# Patient Record
Sex: FEMALE | Race: WHITE | NOT HISPANIC OR LATINO | Employment: OTHER | URBAN - METROPOLITAN AREA
[De-identification: names, ages, dates, MRNs, and addresses within clinical notes are randomized per-mention and may not be internally consistent; named-entity substitution may affect disease eponyms.]

---

## 2017-10-05 ENCOUNTER — TRANSCRIBE ORDERS (OUTPATIENT)
Dept: URGENT CARE | Facility: CLINIC | Age: 82
End: 2017-10-05

## 2017-10-05 ENCOUNTER — OFFICE VISIT (OUTPATIENT)
Dept: URGENT CARE | Facility: CLINIC | Age: 82
End: 2017-10-05
Payer: MEDICARE

## 2017-10-05 ENCOUNTER — APPOINTMENT (OUTPATIENT)
Dept: LAB | Facility: CLINIC | Age: 82
End: 2017-10-05
Payer: MEDICARE

## 2017-10-05 ENCOUNTER — LAB REQUISITION (OUTPATIENT)
Dept: LAB | Facility: HOSPITAL | Age: 82
End: 2017-10-05
Payer: MEDICARE

## 2017-10-05 DIAGNOSIS — R30.0 DYSURIA: ICD-10-CM

## 2017-10-05 PROCEDURE — 87186 SC STD MICRODIL/AGAR DIL: CPT | Performed by: FAMILY MEDICINE

## 2017-10-05 PROCEDURE — G0463 HOSPITAL OUTPT CLINIC VISIT: HCPCS

## 2017-10-05 PROCEDURE — 99203 OFFICE O/P NEW LOW 30 MIN: CPT

## 2017-10-05 PROCEDURE — 87077 CULTURE AEROBIC IDENTIFY: CPT | Performed by: FAMILY MEDICINE

## 2017-10-05 PROCEDURE — 87086 URINE CULTURE/COLONY COUNT: CPT | Performed by: FAMILY MEDICINE

## 2017-10-09 LAB — BACTERIA UR CULT: ABNORMAL

## 2017-10-14 NOTE — PROGRESS NOTES
Assessment  1  Urinary tract infection (599 0) (N39 0)    Plan  Burning with urination    · (1) URINE CULTURE; Source:Urine, Clean Catch; Status:Active - Retrospective By  Protocol Authorization; Requested DZT:[id number];   Urinary tract infection    · Sulfamethoxazole-Trimethoprim 800-160 MG Oral Tablet (Bactrim DS); TAKE 1  TABLET TWICE DAILY UNTIL FINISHED    Discussion/Summary  Discussion Summary:   1) take abx as prescribedencourage hydrationfollow up with pcp if sxs worsen or persist       Chief Complaint  1  Urinary Hesitancy  Chief Complaint Free Text Note Form: Urinary burning and frequency started last night      History of Present Illness  HPI: 81yo F p/w burning upon urination and urinary frequency x 1 day  Pt is requesting bactrim as she had this medicine previously at [facility] for UTI  Pt denies back pain, fevers, chills, fatigue  Hospital Based Practices Required Assessment:   Pain Assessment   the patient states they do not have pain  Abuse And Domestic Violence Screen    Yes, the patient is safe at home  Depression And Suicide Screen  No, the patient has not had thoughts of hurting themself  No, the patient has not felt depressed in the past 7 days  Review of Systems  Focused-Female:   Constitutional: No fever, no chills, feels well, no tiredness, no recent weight gain or loss  ENT: no ear ache, no loss of hearing, no nosebleeds or nasal discharge, no sore throat or hoarseness  Cardiovascular: no complaints of slow or fast heart rate, no chest pain, no palpitations, no leg claudication or lower extremity edema  Respiratory: no complaints of shortness of breath, no wheezing, no dyspnea on exertion, no orthopnea or PND  Breasts: no complaints of breast pain, breast lump or nipple discharge  Gastrointestinal: no complaints of abdominal pain, no constipation, no nausea or diarrhea, no vomiting, no bloody stools     Genitourinary: no complaints of dysuria, no incontinence, no pelvic pain, no dysmenorrhea, no vaginal discharge or abnormal vaginal bleeding  Musculoskeletal: no complaints of arthralgia, no myalgia, no joint swelling or stiffness, no limb pain or swelling  Integumentary: no complaints of skin rash or lesion, no itching or dry skin, no skin wounds  Neurological: no complaints of headache, no confusion, no numbness or tingling, no dizziness or fainting  ROS Reviewed:   ROS reviewed  Past Medical History  1  History of essential hypertension (V12 59) (Z86 79)   2  History of hyperlipidemia (V12 29) (Z86 39)  Active Problems And Past Medical History Reviewed: The active problems and past medical history were reviewed and updated today  Family History  Family History Reviewed: The family history was reviewed and updated today  Social History   · Never a smoker  Social History Reviewed: The social history was reviewed and is unchanged  Surgical History  Surgical History Reviewed: The surgical history was reviewed and updated today  Current Meds   1  Lisinopril TABS; Therapy: (Recorded:05Oct2017) to Recorded   2  Zocor TABS; Therapy: (Recorded:05Oct2017) to Recorded  Medication List Reviewed: The medication list was reviewed and updated today  Allergies  1  No Known Drug Allergies    Vitals  Signs   Recorded: [id number] 05:48PM   Temperature: 98 2 F  Heart Rate: 84  Respiration: 18  Systolic: 482  Diastolic: 88  Height: 5 ft 2 in  Weight: 170 lb   BMI Calculated: 31 09  BSA Calculated: 1 78  O2 Saturation: 98    Physical Exam    Constitutional   General appearance: No acute distress, well appearing and well nourished  Pulmonary   Respiratory effort: No increased work of breathing or signs of respiratory distress  Auscultation of lungs: Clear to auscultation  Cardiovascular   Palpation of heart: Normal PMI, no thrills  Auscultation of heart: Normal rate and rhythm, normal S1 and S2, without murmurs      Abdomen   Abdomen: Abnormal   The abdomen was obese  Bowel sounds were normal  There was moderate tenderness in the suprapubic area  The abdomen was not firm-- and-- not rigid  No rebound tenderness  No guarding  no masses palpated  The abdomen was normal to percussion  no CVA tenderness   Liver and spleen: No hepatomegaly or splenomegaly  Lymphatic   Palpation of lymph nodes in neck: No lymphadenopathy  Skin   Skin and subcutaneous tissue: Normal without rashes or lesions      Psychiatric   Orientation to person, place, and time: Normal     Mood and affect: Normal        Signatures   Electronically signed by : ALO Villa; Oct  5 2017  6:20PM EST                       (Author)    Electronically signed by : TRACY Garrett ; Oct 13 2017 11:40AM EST                       (Co-author)

## 2020-06-29 ENCOUNTER — OFFICE VISIT (OUTPATIENT)
Dept: URGENT CARE | Facility: CLINIC | Age: 85
End: 2020-06-29
Payer: COMMERCIAL

## 2020-06-29 VITALS
HEIGHT: 62 IN | SYSTOLIC BLOOD PRESSURE: 179 MMHG | WEIGHT: 160 LBS | HEART RATE: 93 BPM | DIASTOLIC BLOOD PRESSURE: 81 MMHG | TEMPERATURE: 97.4 F | RESPIRATION RATE: 18 BRPM | OXYGEN SATURATION: 97 % | BODY MASS INDEX: 29.44 KG/M2

## 2020-06-29 DIAGNOSIS — M65.9 TIBIALIS ANTERIOR TENOSYNOVITIS: Primary | ICD-10-CM

## 2020-06-29 PROCEDURE — 99213 OFFICE O/P EST LOW 20 MIN: CPT | Performed by: EMERGENCY MEDICINE

## 2020-06-29 RX ORDER — SIMVASTATIN 10 MG
10 TABLET ORAL
COMMUNITY
Start: 2020-05-07

## 2020-06-29 RX ORDER — ASPIRIN 81 MG/1
81 TABLET, CHEWABLE ORAL DAILY
COMMUNITY

## 2020-06-29 RX ORDER — DIAZEPAM 5 MG/1
5 TABLET ORAL EVERY 6 HOURS PRN
COMMUNITY
Start: 2020-05-07

## 2020-06-29 RX ORDER — LISINOPRIL 40 MG/1
40 TABLET ORAL DAILY
COMMUNITY
Start: 2020-01-09 | End: 2022-04-29

## 2020-06-30 ENCOUNTER — EVALUATION (OUTPATIENT)
Dept: PHYSICAL THERAPY | Facility: CLINIC | Age: 85
End: 2020-06-30
Payer: COMMERCIAL

## 2020-06-30 DIAGNOSIS — M65.9 TIBIALIS ANTERIOR TENOSYNOVITIS: Primary | ICD-10-CM

## 2020-06-30 DIAGNOSIS — S86.112A GASTROCNEMIUS MUSCLE STRAIN, LEFT, INITIAL ENCOUNTER: ICD-10-CM

## 2020-06-30 PROCEDURE — 97161 PT EVAL LOW COMPLEX 20 MIN: CPT | Performed by: PHYSICAL THERAPIST

## 2020-06-30 PROCEDURE — 97110 THERAPEUTIC EXERCISES: CPT | Performed by: PHYSICAL THERAPIST

## 2020-07-02 ENCOUNTER — OFFICE VISIT (OUTPATIENT)
Dept: PHYSICAL THERAPY | Facility: CLINIC | Age: 85
End: 2020-07-02
Payer: COMMERCIAL

## 2020-07-02 DIAGNOSIS — S86.112A GASTROCNEMIUS MUSCLE STRAIN, LEFT, INITIAL ENCOUNTER: ICD-10-CM

## 2020-07-02 DIAGNOSIS — M65.9 TIBIALIS ANTERIOR TENOSYNOVITIS: Primary | ICD-10-CM

## 2020-07-02 PROCEDURE — 97110 THERAPEUTIC EXERCISES: CPT

## 2020-07-02 PROCEDURE — 97140 MANUAL THERAPY 1/> REGIONS: CPT

## 2020-07-02 NOTE — PROGRESS NOTES
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 9    2  Gastrocnemius muscle strain, left, initial encounter S86 112A        Start Time: 0800  Stop Time: 0830  Total time in clinic (min): 30 minutes    Subjective: Patient notes feeling anterior ankle pain during the night  Patient has no complaints of pain prior to therapy  Objective: See treatment diary below    Assessment: Patient very tender at her lateral aspect of the gastroc  Slight soreness post therapy (patient def ice)  Plan: Continue per plan of care       Precautions: HTN- controlled      Daily Treatment Diary:      Initial Evaluation Date: 20  Compliance                    Visit Number 1 2                   Re-Eval  IE                  [address]   [employer]                                          Manual                      STM post  Calf L   10'                   Ankle PROM   5'                                         Ther-Ex                      bike   L1 3'                   Ankle ABC's HEP  2x                   Standing calf stretch - both HEP  20"x5                   Standing HR/TR HEP  x30                                                                                                                                                       Neuro Re-Ed                                                                                                Ther-Act                                                               Modalities                      CP prn   def

## 2020-07-07 ENCOUNTER — OFFICE VISIT (OUTPATIENT)
Dept: PHYSICAL THERAPY | Facility: CLINIC | Age: 85
End: 2020-07-07
Payer: COMMERCIAL

## 2020-07-07 DIAGNOSIS — S86.112A GASTROCNEMIUS MUSCLE STRAIN, LEFT, INITIAL ENCOUNTER: ICD-10-CM

## 2020-07-07 DIAGNOSIS — M65.9 TIBIALIS ANTERIOR TENOSYNOVITIS: Primary | ICD-10-CM

## 2020-07-07 PROCEDURE — 97140 MANUAL THERAPY 1/> REGIONS: CPT | Performed by: PHYSICAL THERAPIST

## 2020-07-07 PROCEDURE — 97110 THERAPEUTIC EXERCISES: CPT | Performed by: PHYSICAL THERAPIST

## 2020-07-07 NOTE — PROGRESS NOTES
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 9    2  Gastrocnemius muscle strain, left, initial encounter N00 401T                   Subjective: Pt reports having good days and bad days  She felt stiff in her calf on , but it is feeling better this morning  Objective: See treatment diary below      Assessment: Tolerated treatment well  Pt continues to have some tenderness in posterior calf, but she noted that it felt better since last session  She needed minimal cueing for proper form and intensity  Pt continues to be afraid of balance exercises due to fear of falls, but was able to perform standing marches while holding on  Patient exhibited good technique with therapeutic exercises and would benefit from continued PT to address current deficits  Plan: Continue per plan of care          Precautions: HTN- controlled      Daily Treatment Diary:      Initial Evaluation Date: 20  Compliance                  Visit Number 1 2  3                 Re-Eval  IE -  -              MC   [employer] Y -  -                                     Manual                      STM post  Calf L   10'  10'                 Ankle PROM   5'  -                                       Ther-Ex                      bike   L1 3'  L1 4'                 Ankle ABC's HEP  2x  2x                 Standing calf stretch - both HEP  20"x5  20" x 5                 Standing HR/TR HEP  x30  30x                  Ankle PF    purple TB 20x                  Standing marches    30x                  Mini squats     nv                                                                                   Neuro Re-Ed                                                                                                Ther-Act Modalities                      CP prn   def Def

## 2020-07-09 ENCOUNTER — OFFICE VISIT (OUTPATIENT)
Dept: PHYSICAL THERAPY | Facility: CLINIC | Age: 85
End: 2020-07-09
Payer: COMMERCIAL

## 2020-07-09 DIAGNOSIS — M65.9 TIBIALIS ANTERIOR TENOSYNOVITIS: Primary | ICD-10-CM

## 2020-07-09 DIAGNOSIS — S86.112A GASTROCNEMIUS MUSCLE STRAIN, LEFT, INITIAL ENCOUNTER: ICD-10-CM

## 2020-07-09 PROCEDURE — 97110 THERAPEUTIC EXERCISES: CPT | Performed by: PHYSICAL THERAPIST

## 2020-07-09 NOTE — PROGRESS NOTES
Daily Note     Today's date: 2020  Patient name: Peggy Montano  : 1931  MRN: [id number]  Referring provider: Luiz Guy MD  Dx:   Encounter Diagnosis     ICD-10-CM    1  Tibialis anterior tenosynovitis M65 9    2  Gastrocnemius muscle strain, left, initial encounter D86 322X                   Subjective: Pt notes that her calf has been feeling better, has been continuing the stretches at home  Complaining of stomach ache this morning, thinks it is due to the fish oil pill she is taking  Objective: See treatment diary below      Assessment: Tolerated treatment well  She was able to perform all TE without increased pain or tenderness  Continues to feel intense stretch in calf during stretches, but no pain noted  Held bike warm up today due to pt not feeling the best  Continue to progress treatment as see fit  Patient demonstrated fatigue post treatment and would benefit from continued PT  Plan: Continue per plan of care        Precautions: HTN- controlled      Daily Treatment Diary:      Initial Evaluation Date: 20  Compliance                Visit Number 1 2   3 4               Re-Som  IE -  -  -            [facility]   [employer] Y -  -  -                                 Manual                      STM post  Calf L   10'   10'  10'               Ankle PROM   5'   -  -                                     Ther-Ex                      bike   L1 3'   L1 4' held               Ankle ABC's HEP  2x   2x  2x               Standing calf stretch - both HEP  20"x5   20" x 5  20" x 5               Standing HR/TR HEP  x30   30x   30x                Ankle PF    purple TB 20x   purple TB 20x                Standing marches    30x   30x                Mini squats     nv  2x10                                                                                 Neuro Re-Ed Ther-Act                                                               Modalities                      CP prn   def Def  Def

## 2020-07-10 ENCOUNTER — OFFICE VISIT (OUTPATIENT)
Dept: URGENT CARE | Facility: CLINIC | Age: 85
End: 2020-07-10
Payer: COMMERCIAL

## 2020-07-10 ENCOUNTER — HOSPITAL ENCOUNTER (EMERGENCY)
Facility: HOSPITAL | Age: 85
Discharge: HOME | End: 2020-07-10
Attending: EMERGENCY MEDICINE | Admitting: EMERGENCY MEDICINE
Payer: COMMERCIAL

## 2020-07-10 ENCOUNTER — APPOINTMENT (EMERGENCY)
Dept: CT IMAGING | Facility: HOSPITAL | Age: 85
End: 2020-07-10
Payer: COMMERCIAL

## 2020-07-10 VITALS
WEIGHT: 161 LBS | BODY MASS INDEX: 29.63 KG/M2 | RESPIRATION RATE: 16 BRPM | DIASTOLIC BLOOD PRESSURE: 72 MMHG | SYSTOLIC BLOOD PRESSURE: 138 MMHG | HEIGHT: 62 IN | HEART RATE: 72 BPM | TEMPERATURE: 97.1 F | OXYGEN SATURATION: 97 %

## 2020-07-10 VITALS
BODY MASS INDEX: 29.44 KG/M2 | TEMPERATURE: 98.1 F | HEART RATE: 88 BPM | HEIGHT: 62 IN | SYSTOLIC BLOOD PRESSURE: 146 MMHG | WEIGHT: 160 LBS | RESPIRATION RATE: 16 BRPM | DIASTOLIC BLOOD PRESSURE: 66 MMHG

## 2020-07-10 DIAGNOSIS — N17.9 AKI (ACUTE KIDNEY INJURY) (HCC): ICD-10-CM

## 2020-07-10 DIAGNOSIS — R10.13 EPIGASTRIC PAIN: Primary | ICD-10-CM

## 2020-07-10 DIAGNOSIS — E87.1 HYPONATREMIA: ICD-10-CM

## 2020-07-10 DIAGNOSIS — R10.12 LEFT UPPER QUADRANT ABDOMINAL PAIN: Primary | ICD-10-CM

## 2020-07-10 LAB
ALBUMIN SERPL BCP-MCNC: 4.4 G/DL (ref 3.5–5)
ALP SERPL-CCNC: 73 U/L (ref 46–116)
ALT SERPL W P-5'-P-CCNC: 44 U/L (ref 12–78)
ANION GAP SERPL CALCULATED.3IONS-SCNC: 10 MMOL/L (ref 4–13)
AST SERPL W P-5'-P-CCNC: 34 U/L (ref 5–45)
BASOPHILS # BLD AUTO: 0.04 THOUSANDS/ΜL (ref 0–0.1)
BASOPHILS NFR BLD AUTO: 1 % (ref 0–1)
BILIRUB SERPL-MCNC: 0.5 MG/DL (ref 0.2–1)
BILIRUB UR QL STRIP: NEGATIVE
BUN SERPL-MCNC: 17 MG/DL (ref 5–25)
CALCIUM SERPL-MCNC: 9.2 MG/DL (ref 8.3–10.1)
CHLORIDE SERPL-SCNC: 89 MMOL/L (ref 100–108)
CLARITY UR: CLEAR
CO2 SERPL-SCNC: 23 MMOL/L (ref 21–32)
COLOR UR: YELLOW
CREAT SERPL-MCNC: 1.43 MG/DL (ref 0.6–1.3)
EOSINOPHIL # BLD AUTO: 0.01 THOUSAND/ΜL (ref 0–0.61)
EOSINOPHIL NFR BLD AUTO: 0 % (ref 0–6)
ERYTHROCYTE [DISTWIDTH] IN BLOOD BY AUTOMATED COUNT: 11.9 % (ref 11.6–15.1)
GFR SERPL CREATININE-BSD FRML MDRD: 32 ML/MIN/1.73SQ M
GLUCOSE SERPL-MCNC: 140 MG/DL (ref 65–140)
GLUCOSE UR STRIP-MCNC: NEGATIVE MG/DL
HCT VFR BLD AUTO: 40.1 % (ref 34.8–46.1)
HGB BLD-MCNC: 13.7 G/DL (ref 11.5–15.4)
HGB UR QL STRIP.AUTO: NEGATIVE
HOLD SPECIMEN: NORMAL
IMM GRANULOCYTES # BLD AUTO: 0.02 THOUSAND/UL (ref 0–0.2)
IMM GRANULOCYTES NFR BLD AUTO: 0 % (ref 0–2)
KETONES UR STRIP-MCNC: NEGATIVE MG/DL
LEUKOCYTE ESTERASE UR QL STRIP: NEGATIVE
LIPASE SERPL-CCNC: 145 U/L (ref 73–393)
LYMPHOCYTES # BLD AUTO: 2.85 THOUSANDS/ΜL (ref 0.6–4.47)
LYMPHOCYTES NFR BLD AUTO: 34 % (ref 14–44)
MCH RBC QN AUTO: 32.3 PG (ref 26.8–34.3)
MCHC RBC AUTO-ENTMCNC: 34.2 G/DL (ref 31.4–37.4)
MCV RBC AUTO: 95 FL (ref 82–98)
MONOCYTES # BLD AUTO: 0.44 THOUSAND/ΜL (ref 0.17–1.22)
MONOCYTES NFR BLD AUTO: 5 % (ref 4–12)
NEUTROPHILS # BLD AUTO: 5.07 THOUSANDS/ΜL (ref 1.85–7.62)
NEUTS SEG NFR BLD AUTO: 60 % (ref 43–75)
NITRITE UR QL STRIP: NEGATIVE
NRBC BLD AUTO-RTO: 0 /100 WBCS
PH UR STRIP.AUTO: 6.5 [PH]
PLATELET # BLD AUTO: 342 THOUSANDS/UL (ref 149–390)
PMV BLD AUTO: 9.3 FL (ref 8.9–12.7)
POTASSIUM SERPL-SCNC: 4.9 MMOL/L (ref 3.5–5.3)
PROT SERPL-MCNC: 8.2 G/DL (ref 6.4–8.2)
PROT UR STRIP-MCNC: NEGATIVE MG/DL
RBC # BLD AUTO: 4.24 MILLION/UL (ref 3.81–5.12)
SODIUM SERPL-SCNC: 122 MMOL/L (ref 136–145)
SP GR UR STRIP.AUTO: <=1.005 (ref 1–1.03)
TROPONIN I SERPL-MCNC: <0.02 NG/ML
UROBILINOGEN UR QL STRIP.AUTO: 0.2 E.U./DL
WBC # BLD AUTO: 8.43 THOUSAND/UL (ref 4.31–10.16)

## 2020-07-10 PROCEDURE — 84484 ASSAY OF TROPONIN QUANT: CPT | Performed by: EMERGENCY MEDICINE

## 2020-07-10 PROCEDURE — 83690 ASSAY OF LIPASE: CPT

## 2020-07-10 PROCEDURE — C9113 INJ PANTOPRAZOLE SODIUM, VIA: HCPCS | Performed by: EMERGENCY MEDICINE

## 2020-07-10 PROCEDURE — 85025 COMPLETE CBC W/AUTO DIFF WBC: CPT

## 2020-07-10 PROCEDURE — 99284 EMERGENCY DEPT VISIT MOD MDM: CPT

## 2020-07-10 PROCEDURE — 96361 HYDRATE IV INFUSION ADD-ON: CPT

## 2020-07-10 PROCEDURE — 99285 EMERGENCY DEPT VISIT HI MDM: CPT | Performed by: EMERGENCY MEDICINE

## 2020-07-10 PROCEDURE — 96375 TX/PRO/DX INJ NEW DRUG ADDON: CPT

## 2020-07-10 PROCEDURE — 36415 COLL VENOUS BLD VENIPUNCTURE: CPT

## 2020-07-10 PROCEDURE — 74177 CT ABD & PELVIS W/CONTRAST: CPT

## 2020-07-10 PROCEDURE — 96374 THER/PROPH/DIAG INJ IV PUSH: CPT

## 2020-07-10 PROCEDURE — 99213 OFFICE O/P EST LOW 20 MIN: CPT | Performed by: EMERGENCY MEDICINE

## 2020-07-10 PROCEDURE — 71260 CT THORAX DX C+: CPT

## 2020-07-10 PROCEDURE — 80053 COMPREHEN METABOLIC PANEL: CPT

## 2020-07-10 PROCEDURE — 81003 URINALYSIS AUTO W/O SCOPE: CPT | Performed by: EMERGENCY MEDICINE

## 2020-07-10 RX ORDER — SODIUM CHLORIDE 9 MG/ML
125 INJECTION, SOLUTION INTRAVENOUS CONTINUOUS
Status: DISCONTINUED | OUTPATIENT
Start: 2020-07-10 | End: 2020-07-10 | Stop reason: HOSPADM

## 2020-07-10 RX ORDER — SULFAMETHOXAZOLE AND TRIMETHOPRIM 400; 80 MG/1; MG/1
2 TABLET ORAL EVERY 12 HOURS SCHEDULED
COMMUNITY
End: 2020-07-26 | Stop reason: ALTCHOICE

## 2020-07-10 RX ORDER — PANTOPRAZOLE SODIUM 40 MG/1
40 TABLET, DELAYED RELEASE ORAL DAILY
Qty: 20 TABLET | Refills: 0 | Status: SHIPPED | OUTPATIENT
Start: 2020-07-10 | End: 2022-02-03

## 2020-07-10 RX ORDER — SACCHAROMYCES BOULARDII 250 MG
250 CAPSULE ORAL 2 TIMES DAILY
COMMUNITY

## 2020-07-10 RX ORDER — ONDANSETRON 2 MG/ML
4 INJECTION INTRAMUSCULAR; INTRAVENOUS ONCE
Status: COMPLETED | OUTPATIENT
Start: 2020-07-10 | End: 2020-07-10

## 2020-07-10 RX ORDER — PANTOPRAZOLE SODIUM 40 MG/1
40 INJECTION, POWDER, FOR SOLUTION INTRAVENOUS ONCE
Status: COMPLETED | OUTPATIENT
Start: 2020-07-10 | End: 2020-07-10

## 2020-07-10 RX ORDER — OMEGA-3-ACID ETHYL ESTERS 1 G/1
2 CAPSULE, LIQUID FILLED ORAL 2 TIMES DAILY
COMMUNITY

## 2020-07-10 RX ADMIN — IOHEXOL 100 ML: 350 INJECTION, SOLUTION INTRAVENOUS at 17:35

## 2020-07-10 RX ADMIN — ONDANSETRON 4 MG: 2 INJECTION INTRAMUSCULAR; INTRAVENOUS at 17:04

## 2020-07-10 RX ADMIN — PANTOPRAZOLE SODIUM 40 MG: 40 INJECTION, POWDER, FOR SOLUTION INTRAVENOUS at 17:04

## 2020-07-10 RX ADMIN — SODIUM CHLORIDE 125 ML/HR: 0.9 INJECTION, SOLUTION INTRAVENOUS at 18:23

## 2020-07-10 NOTE — ED PROVIDER NOTES
History  Chief Complaint   Patient presents with    Abdominal Pain     Pt reports abdominal pain with vomiting for the past 2 days  Denies any vomiting/fevers     Intermittent epigastric pain for 3rd day  Seen at urgent care and referred for evaluation    Past surgical history includes cholecystectomy, appendectomy and hysterectomy      History provided by:  Patient  Abdominal Pain   Pain location:  Epigastric  Pain quality: aching    Pain radiates to:  Does not radiate  Pain severity:  Moderate  Onset quality:  Gradual  Duration:  3 days  Timing:  Intermittent  Progression:  Waxing and waning  Chronicity:  New  Context: eating and previous surgery    Context: not awakening from sleep    Relieved by:  None tried  Exacerbated by: Eating  Ineffective treatments:  None tried  Associated symptoms: anorexia    Associated symptoms: no chest pain, no constipation, no fever, no melena, no nausea, no shortness of breath and no vomiting    Associated symptoms comment:  Last stool typical color, darker on iron  Risk factors: aspirin and multiple surgeries        Prior to Admission Medications   Prescriptions Last Dose Informant Patient Reported?  Taking?   aspirin 81 mg chewable tablet 7/10/2020 at Unknown time  Yes Yes   Sig: Chew 81 mg daily   diazepam (VALIUM) 5 mg tablet   Yes Yes   Si mg every 6 (six) hours as needed    docusate sodium (COLACE) 50 mg capsule 2020 at Unknown time  Yes Yes   Sig: Take 100 mg by mouth 2 (two) times a day    lisinopril (ZESTRIL) 40 mg tablet 7/10/2020 at Unknown time  Yes Yes   Sig: Take 40 mg by mouth daily    omega-3-acid ethyl esters (LOVAZA) 1 g capsule 2020 at Unknown time  Yes Yes   Sig: Take 2 g by mouth 2 (two) times a day   saccharomyces boulardii (FLORASTOR) 250 mg capsule   Yes Yes   Sig: Take 250 mg by mouth 2 (two) times a day   simvastatin (ZOCOR) 10 mg tablet   Yes Yes   Sig: 10 mg    sulfamethoxazole-trimethoprim (BACTRIM) 400-80 mg per tablet 7/10/2020 at Unknown time  Yes Yes   Sig: Take 2 tablets by mouth every 12 (twelve) hours      Facility-Administered Medications: None       Past Medical History:   Diagnosis Date    Arthritis     High cholesterol     Hypertension        Past Surgical History:   Procedure Laterality Date    APPENDECTOMY      CHOLECYSTECTOMY      HIP SURGERY      HYSTERECTOMY      JOINT REPLACEMENT      KNEE SURGERY         Family History   Problem Relation Age of Onset    Cancer Mother     Stroke Father      I have reviewed and agree with the history as documented  E-Cigarette/Vaping     E-Cigarette/Vaping Substances     Social History     Tobacco Use    Smoking status: Never Smoker    Smokeless tobacco: Never Used   Substance Use Topics    Alcohol use: Not Currently    Drug use: Not Currently       Review of Systems   Constitutional: Negative for fever  Respiratory: Negative for shortness of breath  Cardiovascular: Negative for chest pain  Gastrointestinal: Positive for abdominal pain and anorexia  Negative for constipation, melena, nausea and vomiting  All other systems reviewed and are negative  Physical Exam  Physical Exam   Constitutional: She is oriented to person, place, and time  Pleasant, comfortable-appearing, moves well, conversational   HENT:   Head: Normocephalic and atraumatic  Mouth/Throat: Oropharynx is clear and moist    Eyes: Pupils are equal, round, and reactive to light  Conjunctivae and EOM are normal    Neck: Neck supple  Cardiovascular: Normal rate, regular rhythm and normal heart sounds  Pulmonary/Chest: Effort normal and breath sounds normal    Abdominal: Soft  Bowel sounds are normal  She exhibits no distension  There is tenderness in the epigastric area  There is no rigidity and no rebound  Guaiac negative   Musculoskeletal: Normal range of motion  Neurological: She is alert and oriented to person, place, and time  No cranial nerve deficit   Coordination normal    Skin: Skin is warm and dry  Psychiatric: She has a normal mood and affect  Her behavior is normal  Judgment and thought content normal    Nursing note and vitals reviewed  Vital Signs  ED Triage Vitals [07/10/20 1625]   Temperature Pulse Respirations Blood Pressure SpO2   (!) 97 1 °F (36 2 °C) 86 18 (!) 207/94 97 %      Temp Source Heart Rate Source Patient Position - Orthostatic VS BP Location FiO2 (%)   Temporal Monitor Sitting Right arm --      Pain Score       8           Vitals:    07/10/20 1625 07/10/20 1700 07/10/20 1800 07/10/20 1900   BP: (!) 207/94 148/71 143/65 138/72   Pulse: 86 71 74 72   Patient Position - Orthostatic VS: Sitting            Visual Acuity      ED Medications  Medications   sodium chloride 0 9 % infusion (0 mL/hr Intravenous Stopped 7/10/20 1921)   ondansetron (ZOFRAN) injection 4 mg (4 mg Intravenous Given 7/10/20 1704)   pantoprazole (PROTONIX) injection 40 mg (40 mg Intravenous Given 7/10/20 1704)   iohexol (OMNIPAQUE) 350 MG/ML injection (MULTI-DOSE) 100 mL (100 mL Intravenous Given 7/10/20 1735)       Diagnostic Studies  Results Reviewed     Procedure Component Value Units Date/Time    UA w Reflex to Microscopic w Reflex to Culture [[id number]] Collected:  07/10/20 1822    Lab Status:  Final result Specimen:  Urine, Clean Catch Updated:  07/10/20 1829     Color, UA Yellow     Clarity, UA Clear     Specific Gravity, UA <=1 005     pH, UA 6 5     Leukocytes, UA Negative     Nitrite, UA Negative     Protein, UA Negative mg/dl      Glucose, UA Negative mg/dl      Ketones, UA Negative mg/dl      Urobilinogen, UA 0 2 E U /dl      Bilirubin, UA Negative     Blood, UA Negative    [place] draw [[id number]] Collected:  07/10/20 1638    Lab Status:  Final result Specimen:  Blood from Arm, Left Updated:  07/10/20 1801    Narrative: The following orders were created for panel order [place] draw    Procedure                               Abnormality         Status                     --------- -----------         ------                     Raul Nicole Top on [facility][[id number]]                           Final result               Green / Black tube on [facility][[id number]]                       Final result                 Please view results for these tests on the individual orders      Troponin I [[id number]]  (Normal) Collected:  07/10/20 1637    Lab Status:  Final result Specimen:  Blood Updated:  07/10/20 1716     Troponin I <0 02 ng/mL     Comprehensive metabolic panel [[id number]]  (Abnormal) Collected:  07/10/20 1638    Lab Status:  Final result Specimen:  Blood from Arm, Left Updated:  07/10/20 1708     Sodium 122 mmol/L      Potassium 4 9 mmol/L      Chloride 89 mmol/L      CO2 23 mmol/L      ANION GAP 10 mmol/L      BUN 17 mg/dL      Creatinine 1 43 mg/dL      Glucose 140 mg/dL      Calcium 9 2 mg/dL      AST 34 U/L      ALT 44 U/L      Alkaline Phosphatase 73 U/L      Total Protein 8 2 g/dL      Albumin 4 4 g/dL      Total Bilirubin 0 50 mg/dL      eGFR 32 ml/min/1 73sq m     Narrative:       [facility] guidelines for Chronic Kidney Disease (CKD):     Stage 1 with normal or high GFR (GFR > 90 mL/min/1 73 square meters)    Stage 2 Mild CKD (GFR = 60-89 mL/min/1 73 square meters)    Stage 3A Moderate CKD (GFR = 45-59 mL/min/1 73 square meters)    Stage 3B Moderate CKD (GFR = 30-44 mL/min/1 73 square meters)    Stage 4 Severe CKD (GFR = 15-29 mL/min/1 73 square meters)    Stage 5 End Stage CKD (GFR <15 mL/min/1 73 square meters)  Note: GFR calculation is accurate only with a steady state creatinine    Lipase [[id number]]  (Normal) Collected:  07/10/20 1638    Lab Status:  Final result Specimen:  Blood from Arm, Left Updated:  07/10/20 1708     Lipase 145 u/L     CBC and differential [[id number]] Collected:  07/10/20 1638    Lab Status:  Final result Specimen:  Blood from Arm, Left Updated:  07/10/20 1644     WBC 8 43 Thousand/uL      RBC 4 24 Million/uL Hemoglobin 13 7 g/dL      Hematocrit 40 1 %      MCV 95 fL      MCH 32 3 pg      MCHC 34 2 g/dL      RDW 11 9 %      MPV 9 3 fL      Platelets 896 Thousands/uL      nRBC 0 /100 WBCs      Neutrophils Relative 60 %      Immat GRANS % 0 %      Lymphocytes Relative 34 %      Monocytes Relative 5 %      Eosinophils Relative 0 %      Basophils Relative 1 %      Neutrophils Absolute 5 07 Thousands/µL      Immature Grans Absolute 0 02 Thousand/uL      Lymphocytes Absolute 2 85 Thousands/µL      Monocytes Absolute 0 44 Thousand/µL      Eosinophils Absolute 0 01 Thousand/µL      Basophils Absolute 0 04 Thousands/µL                  CT chest abdomen pelvis w contrast   Final Result by Sophie Sifuentes MD (07/10 1819)      No acute inflammatory process identified in the chest, abdomen or pelvis  Large volume of colonic stool  Small hiatal hernia  Simple right renal cysts  Workstation performed: [id number]                    Procedures  Procedures         ED Course  ED Course as of Jul 10 1925   Fri Jul 10, 2020   1644 EKG 1636 normal sinus rhythm rate 88 normal axis normal intervals T-wave inversion V1 ADL no ST elevation or depression interpreted by me      1803 2/20 cmp reviewed, normal      1849 IMPRESSION:     No acute inflammatory process identified in the chest, abdomen or pelvis      Large volume of colonic stool      Small hiatal hernia      Simple right renal cysts  CT chest abdomen pelvis w contrast   1906 Notes she is feeling much better, no pain  We discussed results and hospitalization, family refusing, states she would like to follow up with her physician and will return immediately if worse or new symptoms, voices good understanding to return if reconsiders hospitalization, further evaluation  Abdomen benign, nontender      1924 Continues to feel well and evidently refusing hospitalization          US AUDIT      Most Recent Value   Initial Alcohol Screen: US AUDIT-C    1   How often do you have a drink containing alcohol?  0 Filed at: 07/10/2020 1626   2  How many drinks containing alcohol do you have on a typical day you are drinking? 0 Filed at: 07/10/2020 1626   3a  Male UNDER 65: How often do you have five or more drinks on one occasion? 0 Filed at: 07/10/2020 1626   3b  FEMALE Any Age, or MALE 65+: How often do you have 4 or more drinks on one occassion? 0 Filed at: 07/10/2020 1626   Audit-C Score  0 Filed at: 07/10/2020 1626                  MAYDA/DAST-10      Most Recent Value   How many times in the past year have you    Used an illegal drug or used a prescription medication for non-medical reasons? Never Filed at: 07/10/2020 1626                                MDM      Disposition  Final diagnoses:   Epigastric pain   Hyponatremia   TIGIST (acute kidney injury) ([facility] [address] )     Time reflects when diagnosis was documented in both MDM as applicable and the Disposition within this note     Time User Action Codes Description Comment    7/10/2020  6:00 PM Charm Puff Add [R10 13] Epigastric pain     7/10/2020  6:00 PM Charm Puff Add [E87 1] Hyponatremia     7/10/2020  6:12 PM Charm Puff Add [N17 9] TIGIST (acute kidney injury) [facility])       ED Disposition     ED Disposition Condition Date/Time Comment    Discharge Stable Fri Jul 10, 2020  7:10 PM Clinton Sellers discharge to home/self care  Follow-up Information     Follow up With Specialties Details Why [address] [place] [address], DO Internal Medicine   [address] [place] [address]  [phone]            Patient's Medications   Discharge Prescriptions    PANTOPRAZOLE (PROTONIX) 40 MG TABLET    Take 1 tablet (40 mg total) by mouth daily for 20 days       Start Date: 7/10/2020 End Date: 7/30/2020       Order Dose: 40 mg       Quantity: 20 tablet    Refills: 0     No discharge procedures on file      PDMP Review     None          ED Provider  Electronically Signed by Lashanda Dennis,   07/10/20 1 Clayton Farias,   07/10/20 1925

## 2020-07-10 NOTE — PROGRESS NOTES
[facility]        NAME: Marianela Talley is a 80 y o  female  : 1931    MRN: [id number]  DATE: July 10, 2020  TIME: 3:40 PM    Assessment and Plan   Left upper quadrant abdominal pain [R10 12]  1  Left upper quadrant abdominal pain  Ambulatory Referral to Emergency Medicine    Transfer to other facility         Patient Instructions     Patient Instructions   Abdominal Pain   WHAT YOU NEED TO KNOW:   Abdominal pain can be dull, achy, or sharp  You may have pain in one area of your abdomen, or in your entire abdomen  Your pain may be caused by a condition such as constipation, food sensitivity or poisoning, infection, or a blockage  Abdominal pain can also be from a hernia, appendicitis, or an ulcer  Liver, gallbladder, or kidney conditions can also cause abdominal pain  The cause of your abdominal pain may be unknown  DISCHARGE INSTRUCTIONS:   Return to the emergency department if:   · You have new chest pain or shortness of breath  · You have pulsing pain in your upper abdomen or lower back that suddenly becomes constant  · Your pain is in the right lower abdominal area and worsens with movement  · You have a fever over 100 4°F (38°C) or shaking chills  · You are vomiting and cannot keep food or liquids down  · Your pain does not improve or gets worse over the next 8 to 12 hours  · You see blood in your vomit or bowel movements, or they look black and tarry  · Your skin or the whites of your eyes turn yellow  · You are a woman and have a large amount of vaginal bleeding that is not your monthly period  Contact your healthcare provider if:   · You have pain in your lower back  · You are a man and have pain in your testicles  · You have pain when you urinate  · You have questions or concerns about your condition or care    Follow up with your healthcare provider within 24 hours or as directed:  Write down your questions so you remember to ask them during your visits  Medicines:   · Medicines  may be given to calm your stomach and prevent vomiting or to decrease pain  Ask how to take pain medicine safely  · Take your medicine as directed  Contact your healthcare provider if you think your medicine is not helping or if you have side effects  Tell him of her if you are allergic to any medicine  Keep a list of the medicines, vitamins, and herbs you take  Include the amounts, and when and why you take them  Bring the list or the pill bottles to follow-up visits  Carry your medicine list with you in case of an emergency  © 2017 [address]  Information is for End User's use only and may not be sold, redistributed or otherwise used for commercial purposes  All illustrations and images included in CareNotes® are the copyrighted property of [employer]  or Frankie Ramírez  The above information is an  only  It is not intended as medical advice for individual conditions or treatments  Talk to your doctor, nurse or pharmacist before following any medical regimen to see if it is safe and effective for you  Follow up with PCP in 3-5 days  Proceed to  ER if symptoms worsen  Chief Complaint     Chief Complaint   Patient presents with    Abdominal Pain     epigastric pain x 2 days with nausea  Feels as if she could vomit she would feel better         History of Present Illness       Patient complains of gradually worsening left upper quadrant pain over the past 2 days  With nausea  She denies vomiting, diarrhea constipation  She denies fever or chills  Past surgical history significant for cholecystectomy and partial hysterectomy  Review of Systems   Review of Systems   Constitutional: Negative for chills, fatigue and fever  HENT: Negative for congestion, trouble swallowing and voice change  Respiratory: Negative for cough, chest tightness, shortness of breath and wheezing      Cardiovascular: Negative for chest pain    Gastrointestinal: Positive for abdominal pain  Negative for abdominal distention, anal bleeding, blood in stool, diarrhea, nausea, rectal pain and vomiting  Genitourinary: Negative for dysuria  Musculoskeletal: Negative for neck stiffness  Skin: Negative for rash  Hematological: Negative for adenopathy  Current Medications       Current Outpatient Medications:     aspirin 81 mg chewable tablet, Chew 81 mg daily, Disp: , Rfl:     diazepam (VALIUM) 5 mg tablet, 5 mg every 6 (six) hours as needed , Disp: , Rfl:     docusate sodium (COLACE) 50 mg capsule, Take 100 mg by mouth 2 (two) times a day , Disp: , Rfl:     lisinopril (ZESTRIL) 40 mg tablet, Take 40 mg by mouth daily , Disp: , Rfl:     omega-3-acid ethyl esters (LOVAZA) 1 g capsule, Take 2 g by mouth 2 (two) times a day, Disp: , Rfl:     saccharomyces boulardii (FLORASTOR) 250 mg capsule, Take 250 mg by mouth 2 (two) times a day, Disp: , Rfl:     simvastatin (ZOCOR) 10 mg tablet, 10 mg , Disp: , Rfl:     sulfamethoxazole-trimethoprim (BACTRIM) 400-80 mg per tablet, Take 2 tablets by mouth every 12 (twelve) hours, Disp: , Rfl:     Current Allergies     Allergies as of 07/10/2020 - Reviewed 07/10/2020   Allergen Reaction Noted    Ibuprofen Other (See Comments) 10/03/2012            The following portions of the patient's history were reviewed and updated as appropriate: allergies, current medications, past family history, past medical history, past social history, past surgical history and problem list      Past Medical History:   Diagnosis Date    Arthritis     High cholesterol     Hypertension        Past Surgical History:   Procedure Laterality Date    APPENDECTOMY      CHOLECYSTECTOMY      HIP SURGERY      HYSTERECTOMY      JOINT REPLACEMENT      KNEE SURGERY         Family History   Problem Relation Age of Onset    Cancer Mother     Stroke Father          Medications have been verified          Objective   /66 Pulse 88   Temp 98 1 °F (36 7 °C) (Tympanic)   Resp 16   Ht 5' 2" (1 575 m)   Wt 72 6 kg (160 lb)   BMI 29 26 kg/m²        Physical Exam     Physical Exam   Constitutional: She is oriented to person, place, and time  She appears well-developed and well-nourished  No distress  HENT:   Right Ear: Tympanic membrane and external ear normal    Left Ear: Tympanic membrane and external ear normal    Nose: Mucosal edema present  Mouth/Throat: Posterior oropharyngeal erythema present  No oropharyngeal exudate or tonsillar abscesses  Neck: Neck supple  Cardiovascular: Normal rate and regular rhythm  Pulmonary/Chest: Effort normal    Abdominal: Soft  She exhibits no distension and no mass  Bowel sounds are decreased  There is no hepatosplenomegaly, splenomegaly or hepatomegaly  There is tenderness  There is no rigidity, no rebound, no guarding, no tenderness at McBurney's point and negative Anne's sign  No hernia  Tender left upper quadrant, no rebound or guarding  Neurological: She is alert and oriented to person, place, and time  Skin: Skin is warm and dry  No rash noted  No pallor  Nursing note and vitals reviewed

## 2020-07-10 NOTE — DISCHARGE INSTRUCTIONS
Return immediately if worse or any symptoms or reconsider hospitalization  Please see your physician within next few days, call as soon as possible for evaluation

## 2020-07-10 NOTE — PATIENT INSTRUCTIONS
Abdominal Pain   WHAT YOU NEED TO KNOW:   Abdominal pain can be dull, achy, or sharp  You may have pain in one area of your abdomen, or in your entire abdomen  Your pain may be caused by a condition such as constipation, food sensitivity or poisoning, infection, or a blockage  Abdominal pain can also be from a hernia, appendicitis, or an ulcer  Liver, gallbladder, or kidney conditions can also cause abdominal pain  The cause of your abdominal pain may be unknown  DISCHARGE INSTRUCTIONS:   Return to the emergency department if:   · You have new chest pain or shortness of breath  · You have pulsing pain in your upper abdomen or lower back that suddenly becomes constant  · Your pain is in the right lower abdominal area and worsens with movement  · You have a fever over 100 4°F (38°C) or shaking chills  · You are vomiting and cannot keep food or liquids down  · Your pain does not improve or gets worse over the next 8 to 12 hours  · You see blood in your vomit or bowel movements, or they look black and tarry  · Your skin or the whites of your eyes turn yellow  · You are a woman and have a large amount of vaginal bleeding that is not your monthly period  Contact your healthcare provider if:   · You have pain in your lower back  · You are a man and have pain in your testicles  · You have pain when you urinate  · You have questions or concerns about your condition or care  Follow up with your healthcare provider within 24 hours or as directed:  Write down your questions so you remember to ask them during your visits  Medicines:   · Medicines  may be given to calm your stomach and prevent vomiting or to decrease pain  Ask how to take pain medicine safely  · Take your medicine as directed  Contact your healthcare provider if you think your medicine is not helping or if you have side effects  Tell him of her if you are allergic to any medicine   Keep a list of the medicines, vitamins, and herbs you take  Include the amounts, and when and why you take them  Bring the list or the pill bottles to follow-up visits  Carry your medicine list with you in case of an emergency  © 2017 [address] Information is for End User's use only and may not be sold, redistributed or otherwise used for commercial purposes  All illustrations and images included in CareNotes® are the copyrighted property of [employer]  or Frankie Ramírez  The above information is an  only  It is not intended as medical advice for individual conditions or treatments  Talk to your doctor, nurse or pharmacist before following any medical regimen to see if it is safe and effective for you

## 2020-07-14 ENCOUNTER — APPOINTMENT (OUTPATIENT)
Dept: PHYSICAL THERAPY | Facility: CLINIC | Age: 85
End: 2020-07-14
Payer: COMMERCIAL

## 2020-07-15 ENCOUNTER — TRANSCRIBE ORDERS (OUTPATIENT)
Dept: PHYSICAL THERAPY | Facility: CLINIC | Age: 85
End: 2020-07-15

## 2020-07-15 DIAGNOSIS — M65.9 SYNOVITIS AND TENOSYNOVITIS, UNSPECIFIED: Primary | ICD-10-CM

## 2020-07-26 ENCOUNTER — OFFICE VISIT (OUTPATIENT)
Dept: URGENT CARE | Facility: CLINIC | Age: 85
End: 2020-07-26
Payer: COMMERCIAL

## 2020-07-26 VITALS
RESPIRATION RATE: 16 BRPM | HEIGHT: 62 IN | SYSTOLIC BLOOD PRESSURE: 176 MMHG | DIASTOLIC BLOOD PRESSURE: 79 MMHG | OXYGEN SATURATION: 97 % | BODY MASS INDEX: 29.63 KG/M2 | TEMPERATURE: 97.8 F | WEIGHT: 161 LBS | HEART RATE: 85 BPM

## 2020-07-26 DIAGNOSIS — N30.00 ACUTE CYSTITIS WITHOUT HEMATURIA: Primary | ICD-10-CM

## 2020-07-26 LAB
SL AMB  POCT GLUCOSE, UA: NEGATIVE
SL AMB LEUKOCYTE ESTERASE,UA: ABNORMAL
SL AMB POCT BILIRUBIN,UA: NEGATIVE
SL AMB POCT BLOOD,UA: ABNORMAL
SL AMB POCT CLARITY,UA: ABNORMAL
SL AMB POCT COLOR,UA: YELLOW
SL AMB POCT KETONES,UA: NEGATIVE
SL AMB POCT NITRITE,UA: NEGATIVE
SL AMB POCT PH,UA: 6
SL AMB POCT SPECIFIC GRAVITY,UA: 1.01
SL AMB POCT URINE PROTEIN: NEGATIVE
SL AMB POCT UROBILINOGEN: 0.2

## 2020-07-26 PROCEDURE — 87086 URINE CULTURE/COLONY COUNT: CPT | Performed by: EMERGENCY MEDICINE

## 2020-07-26 PROCEDURE — 99213 OFFICE O/P EST LOW 20 MIN: CPT | Performed by: EMERGENCY MEDICINE

## 2020-07-26 PROCEDURE — 81002 URINALYSIS NONAUTO W/O SCOPE: CPT | Performed by: EMERGENCY MEDICINE

## 2020-07-26 RX ORDER — PHENAZOPYRIDINE HYDROCHLORIDE 200 MG/1
200 TABLET, FILM COATED ORAL
Qty: 10 TABLET | Refills: 0 | Status: SHIPPED | OUTPATIENT
Start: 2020-07-26 | End: 2022-02-03

## 2020-07-26 RX ORDER — NITROFURANTOIN 25; 75 MG/1; MG/1
100 CAPSULE ORAL 2 TIMES DAILY
Qty: 14 CAPSULE | Refills: 0 | Status: SHIPPED | OUTPATIENT
Start: 2020-07-26 | End: 2020-08-02

## 2020-07-26 NOTE — PATIENT INSTRUCTIONS
UTI  We are sending your urine for culture to determine whether the bacteria causing the infection will be killed with the antibiotic previously prescribed to you  We will call you if there is any problem with the antibiotic we have prescribed  You may take over the counter cranberry supplements, or AZO tablets, which may lessen your UTI symptoms  Increase the amount of fluids you drink daily to flush the urinary system  a  Follow up with Gynecologist if your symptoms do not resolve after antibiotic  b  Go to Emergency Room if you develop a fever greater than 101 4 and back pain      Urinary Tract Infection in Women, Ambulatory Care   GENERAL INFORMATION:   A urinary tract infection (UTI)  is caused by bacteria that get inside your urinary tract  Most bacteria that enter your urinary tract are expelled when you urinate  If the bacteria stay in your urinary tract, you may get an infection  Your urinary tract includes your kidneys, ureters, bladder, and urethra  Urine is made in your kidneys, and it flows from the ureters to the bladder  Urine leaves the bladder through the urethra  A UTI is more common in your lower urinary tract, which includes your bladder and urethra  Common symptoms include the following:   · Urinating more often or waking from sleep to urinate    · Pain or burning when you urinate    · Pain or pressure in your lower abdomen     · Urine that smells bad    · Blood in your urine    · Leaking urine  Seek immediate care for the following symptoms:   · Urinating very little or not at all    · Vomiting    · Shaking chills with a fever    · Side or back pain that gets worse  Treatment for a UTI  may include medicines to treat a bacterial infection  You may also need medicines to decrease pain and burning, or decrease the urge to urinate often  Prevent a UTI:   · Urinate when you feel the urge  Do not hold your urine  Urinate as soon as you feel you have to  · Drink liquids as directed  Ask how much liquid to drink each day and which liquids are best for you  You may need to drink more fluids than usual to help flush out the bacteria  Do not drink alcohol, caffeine, and citrus juices  These can irritate your bladder and increase your symptoms  · Apply heat  on your abdomen for 20 to 30 minutes every 2 hours for as many days as directed  Heat helps decrease discomfort and pressure in your bladder  Follow up with your healthcare provider as directed:  Write down your questions so you remember to ask them during your visits  CARE AGREEMENT:   You have the right to help plan your care  Learn about your health condition and how it may be treated  Discuss treatment options with your caregivers to decide what care you want to receive  You always have the right to refuse treatment  The above information is an  only  It is not intended as medical advice for individual conditions or treatments  Talk to your doctor, nurse or pharmacist before following any medical regimen to see if it is safe and effective for you  © 2014 [address] is for End User's use only and may not be sold, redistributed or otherwise used for commercial purposes  All illustrations and images included in CareNotes® are the copyrighted property of [employer]  or [employer]  We are sending your urine for a culture to determine whether there are bacteria in your urine  If there are bacteria, then we will determine what antibiotics will work best and which will not work  You may take an over the counter cranberry supplement  which may help lessen your dysuria symptoms  Increase the amount of fluids you drink daily to flush infection  c  Follow up with Gynecologist if your symptoms do not resolve  d   Go to Emergency Room if you develop a fever greater than 101 4 and back pain    Dysuria   AMBULATORY CARE:   Dysuria  is trouble urinating, or pain, burning, or discomfort when you urinate  Dysuria is usually a symptom of another problem, such as a blockage or urinary tract infection  Common symptoms include the following:   · Fever     · Cloudy, bad smelling urine     · Urge to urinate often but urinating little     · Back, side, or abdominal pain     · Blood in your urine     · Discharge that smells bad     · Itching  Seek care immediately if:   · You have severe back, side, or abdominal pain  · You have fever and shaking chills  · You vomit several times in a row  Contact your healthcare provider if:   · Your symptoms do not go away, even after treatment  · You have questions or concerns about your condition or care  Treatment for dysuria  may include medicines to treat a bacterial infection or help decrease bladder spasms  Manage your dysuria:   · Drink more liquids  Liquids help flush out bacteria that may be causing an infection  Ask your healthcare provider how much liquid to drink each day and which liquids are best for you  · Take sitz baths as directed  Fill a bathtub with 4 to 6 inches of warm water  You may also use a sitz bath pan that fits over a toilet  Sit in the sitz bath for 20 minutes  Do this 2 to 3 times a day, or as directed  The warm water can help decrease pain and swelling  Follow up with your healthcare provider as directed:  Write down your questions so you remember to ask them during your visits  © 2017 [address] Information is for End User's use only and may not be sold, redistributed or otherwise used for commercial purposes  All illustrations and images included in CareNotes® are the copyrighted property of [employer]  or Frankie Ramírez  The above information is an  only  It is not intended as medical advice for individual conditions or treatments  Talk to your doctor, nurse or pharmacist before following any medical regimen to see if it is safe and effective for you

## 2020-07-26 NOTE — PROGRESS NOTES
[facility]        NAME: Maida Schirmer is a 80 y o  female  : 1931    MRN: [id number]  DATE: 2020  TIME: 8:37 AM    Assessment and Plan   Acute cystitis without hematuria [N30 00]  1  Acute cystitis without hematuria  POCT urine dip    nitrofurantoin (MACROBID) 100 mg capsule    phenazopyridine (PYRIDIUM) 200 mg tablet    Urine culture         Patient Instructions     Patient Instructions     UTI  We are sending your urine for culture to determine whether the bacteria causing the infection will be killed with the antibiotic previously prescribed to you  We will call you if there is any problem with the antibiotic we have prescribed  You may take over the counter cranberry supplements, or AZO tablets, which may lessen your UTI symptoms  Increase the amount of fluids you drink daily to flush the urinary system  a  Follow up with Gynecologist if your symptoms do not resolve after antibiotic  b  Go to Emergency Room if you develop a fever greater than 101 4 and back pain      Urinary Tract Infection in Women, Ambulatory Care   GENERAL INFORMATION:   A urinary tract infection (UTI)  is caused by bacteria that get inside your urinary tract  Most bacteria that enter your urinary tract are expelled when you urinate  If the bacteria stay in your urinary tract, you may get an infection  Your urinary tract includes your kidneys, ureters, bladder, and urethra  Urine is made in your kidneys, and it flows from the ureters to the bladder  Urine leaves the bladder through the urethra  A UTI is more common in your lower urinary tract, which includes your bladder and urethra          Common symptoms include the following:   · Urinating more often or waking from sleep to urinate    · Pain or burning when you urinate    · Pain or pressure in your lower abdomen     · Urine that smells bad    · Blood in your urine    · Leaking urine  Seek immediate care for the following symptoms:   · Urinating very little or not at all    · Vomiting    · Shaking chills with a fever    · Side or back pain that gets worse  Treatment for a UTI  may include medicines to treat a bacterial infection  You may also need medicines to decrease pain and burning, or decrease the urge to urinate often  Prevent a UTI:   · Urinate when you feel the urge  Do not hold your urine  Urinate as soon as you feel you have to  · Drink liquids as directed  Ask how much liquid to drink each day and which liquids are best for you  You may need to drink more fluids than usual to help flush out the bacteria  Do not drink alcohol, caffeine, and citrus juices  These can irritate your bladder and increase your symptoms  · Apply heat  on your abdomen for 20 to 30 minutes every 2 hours for as many days as directed  Heat helps decrease discomfort and pressure in your bladder  Follow up with your healthcare provider as directed:  Write down your questions so you remember to ask them during your visits  CARE AGREEMENT:   You have the right to help plan your care  Learn about your health condition and how it may be treated  Discuss treatment options with your caregivers to decide what care you want to receive  You always have the right to refuse treatment  The above information is an  only  It is not intended as medical advice for individual conditions or treatments  Talk to your doctor, nurse or pharmacist before following any medical regimen to see if it is safe and effective for you  © 2014 [address] is for End User's use only and may not be sold, redistributed or otherwise used for commercial purposes  All illustrations and images included in CareNotes® are the copyrighted property of [employer]  or Frankie Ramírez  We are sending your urine for a culture to determine whether there are bacteria in your urine    If there are bacteria, then we will determine what antibiotics will work best and which will not work  You may take an over the counter cranberry supplement  which may help lessen your dysuria symptoms  Increase the amount of fluids you drink daily to flush infection  c  Follow up with Gynecologist if your symptoms do not resolve  d  Go to Emergency Room if you develop a fever greater than 101 4 and back pain    Dysuria   AMBULATORY CARE:   Dysuria  is trouble urinating, or pain, burning, or discomfort when you urinate  Dysuria is usually a symptom of another problem, such as a blockage or urinary tract infection  Common symptoms include the following:   · Fever     · Cloudy, bad smelling urine     · Urge to urinate often but urinating little     · Back, side, or abdominal pain     · Blood in your urine     · Discharge that smells bad     · Itching  Seek care immediately if:   · You have severe back, side, or abdominal pain  · You have fever and shaking chills  · You vomit several times in a row  Contact your healthcare provider if:   · Your symptoms do not go away, even after treatment  · You have questions or concerns about your condition or care  Treatment for dysuria  may include medicines to treat a bacterial infection or help decrease bladder spasms  Manage your dysuria:   · Drink more liquids  Liquids help flush out bacteria that may be causing an infection  Ask your healthcare provider how much liquid to drink each day and which liquids are best for you  · Take sitz baths as directed  Fill a bathtub with 4 to 6 inches of warm water  You may also use a sitz bath pan that fits over a toilet  Sit in the sitz bath for 20 minutes  Do this 2 to 3 times a day, or as directed  The warm water can help decrease pain and swelling  Follow up with your healthcare provider as directed:  Write down your questions so you remember to ask them during your visits     © 2017 [address] Information is for End User's use only and may not be sold, redistributed or otherwise used for commercial purposes  All illustrations and images included in CareNotes® are the copyrighted property of [employer]  or Frankie Ramírez  The above information is an  only  It is not intended as medical advice for individual conditions or treatments  Talk to your doctor, nurse or pharmacist before following any medical regimen to see if it is safe and effective for you  Follow up with PCP in 3-5 days  Proceed to  ER if symptoms worsen  Chief Complaint     Chief Complaint   Patient presents with    Possible UTI     c/o urinary frequency and cloudy urine since this am         History of Present Illness       Patient complains of cloudy urine and increased frequency since this morning  She was recently treated for UTI with Bactrim  Review of Systems   Review of Systems   Constitutional: Negative for chills and fever  HENT: Negative for trouble swallowing and voice change  Respiratory: Negative for chest tightness, shortness of breath and wheezing  Cardiovascular: Negative for chest pain  Genitourinary: Positive for dysuria and urgency  Negative for flank pain and hematuria  Musculoskeletal: Negative for back pain           Current Medications       Current Outpatient Medications:     aspirin 81 mg chewable tablet, Chew 81 mg daily, Disp: , Rfl:     diazepam (VALIUM) 5 mg tablet, 5 mg every 6 (six) hours as needed , Disp: , Rfl:     docusate sodium (COLACE) 50 mg capsule, Take 100 mg by mouth 2 (two) times a day , Disp: , Rfl:     lisinopril (ZESTRIL) 40 mg tablet, Take 40 mg by mouth daily , Disp: , Rfl:     omega-3-acid ethyl esters (LOVAZA) 1 g capsule, Take 2 g by mouth 2 (two) times a day, Disp: , Rfl:     pantoprazole (PROTONIX) 40 mg tablet, Take 1 tablet (40 mg total) by mouth daily for 20 days, Disp: 20 tablet, Rfl: 0    saccharomyces boulardii (FLORASTOR) 250 mg capsule, Take 250 mg by mouth 2 (two) times a day, Disp: , Rfl:    simvastatin (ZOCOR) 10 mg tablet, 10 mg , Disp: , Rfl:     nitrofurantoin (MACROBID) 100 mg capsule, Take 1 capsule (100 mg total) by mouth 2 (two) times a day for 7 days, Disp: 14 capsule, Rfl: 0    phenazopyridine (PYRIDIUM) 200 mg tablet, Take 1 tablet (200 mg total) by mouth 3 (three) times a day with meals, Disp: 10 tablet, Rfl: 0    Current Allergies     Allergies as of 07/26/2020 - Reviewed 07/26/2020   Allergen Reaction Noted    Bactrim [sulfamethoxazole-trimethoprim] Other (See Comments) 07/26/2020    Ibuprofen Other (See Comments) 10/03/2012            The following portions of the patient's history were reviewed and updated as appropriate: allergies, current medications, past family history, past medical history, past social history, past surgical history and problem list      Past Medical History:   Diagnosis Date    Arthritis     High cholesterol     Hypertension     Urinary tract infection        Past Surgical History:   Procedure Laterality Date    APPENDECTOMY      CHOLECYSTECTOMY      HIP SURGERY      HYSTERECTOMY      JOINT REPLACEMENT      KNEE SURGERY         Family History   Problem Relation Age of Onset    Cancer Mother     Stroke Father          Medications have been verified  Objective   BP (!) 176/79   Pulse 85   Temp 97 8 °F (36 6 °C) (Tympanic)   Resp 16   Ht 5' 2" (1 575 m)   Wt 73 kg (161 lb)   SpO2 97%   BMI 29 45 kg/m²        Physical Exam     Physical Exam   Constitutional: She is oriented to person, place, and time  She appears well-developed and well-nourished  No distress  Abdominal: Soft  Bowel sounds are normal  She exhibits no distension and no mass  There is tenderness  There is no rebound and no guarding  Tender suprapubic region no rebound or guarding, no CVA tenderness  Neurological: She is alert and oriented to person, place, and time  Skin: Skin is warm and dry  No rash noted  Psychiatric: She has a normal mood and affect   Her behavior is normal  Judgment and thought content normal    Nursing note and vitals reviewed

## 2020-07-27 LAB — BACTERIA UR CULT: NORMAL

## 2020-08-04 ENCOUNTER — EVALUATION (OUTPATIENT)
Dept: PHYSICAL THERAPY | Facility: CLINIC | Age: 85
End: 2020-08-04
Payer: COMMERCIAL

## 2020-08-04 DIAGNOSIS — M65.9 TIBIALIS ANTERIOR TENOSYNOVITIS: Primary | ICD-10-CM

## 2020-08-04 DIAGNOSIS — S86.112A GASTROCNEMIUS MUSCLE STRAIN, LEFT, INITIAL ENCOUNTER: ICD-10-CM

## 2020-08-04 PROCEDURE — 97110 THERAPEUTIC EXERCISES: CPT | Performed by: PHYSICAL THERAPIST

## 2020-08-04 PROCEDURE — 97140 MANUAL THERAPY 1/> REGIONS: CPT | Performed by: PHYSICAL THERAPIST

## 2020-08-05 NOTE — PROGRESS NOTES
Re-Evaluation/ DC Summary     Today's date: 2020  Patient name: Janell Lewis  : 1931  MRN: [id number]  Referring provider: Kim Mauricio MD  Dx:   Encounter Diagnosis     ICD-10-CM    1  Tibialis anterior tenosynovitis  M65 9    2  Gastrocnemius muscle strain, left, initial encounter  S86 112A                    Assessment  Assessment details: Janell Lewis has been compliant with attending PT and HEP since initial eval  Janell Lewis has made improvements in objective data since IE and has maximized function  PT reviewed HEP during visit today, pt was comfortable performing all exercises/stretches on her own  Patient is agreeable to d/c to HEP at this time, and will contact clinic with any exercise related questions or concerns as needed  Impairments: activity intolerance, impaired balance, impaired physical strength, lacks appropriate home exercise program and pain with function    Symptom irritability: moderateUnderstanding of Dx/Px/POC: excellent  Goals  ST  Pt will report 50% less tenderness in posterior calf in 2 weeks  MET  2  Pt will report no sleep disturbances due to calf tightness in 2 weeks  MET    LT  Pt will abolish pain to improve quality of life by D/C  MET- some tightness felt with increased activity, but pain  2  Pt will return to PLOF by D/C  MET   3  Strength in L LE will be equal to contralateral side by D/C  MET  4  Pt will be able to go up and down a flight of stairs without difficulty by D/C   MET    Plan  Patient would benefit from: skilled physical therapy  Planned modality interventions: cryotherapy and hydrotherapy  Planned therapy interventions: massage, manual therapy, patient education, body mechanics training, balance, abdominal trunk stabilization, stretching, strengthening, therapeutic activities, therapeutic exercise, home exercise program, gait training, functional ROM exercises and flexibility  Frequency: 2x week  Duration in weeks: 4  Treatment plan discussed with: patient        Subjective Evaluation    History of Present Illness  Mechanism of injury: Pt was in the hospital for low sodium a few weeks ago  She states that while in the hospital, she noted some tightness in calf  Since coming home, she has been doing her stretches and exercises and it has been helping  She notes no difficulty with ADL's or IADL's in the last week  She is comfortable continuing HEP at home and D/C from therapy at this time  Quality of life: excellent    Pain  Current pain ratin  At best pain ratin  At worst pain ratin  Location: posterior calf   Quality: cramping and pressure  Relieving factors: ice    Social Support  Stairs in house: yes   Lives in: multiple-level home  Lives with: alone      Diagnostic Tests  No diagnostic tests performed  Treatments  No previous or current treatments  Patient Goals  Patient goals for therapy: decreased pain, increased strength and increased motion        Objective     Palpation   Left   No palpable tenderness to the anterior tibialis  No tenderness of the lateral gastrocnemius, medial gastrocnemius and soleus  Tenderness   Left Ankle/Foot   No tenderness in the Achilles insertion and proximal Achilles  Active Range of Motion   Left Ankle/Foot   Normal active range of motion    Right Ankle/Foot   Normal active range of motion    Additional Active Range of Motion Details  No increased tightness felt in DF in L LE  Strength/Myotome Testing     Left Hip   Normal muscle strength    Right Hip   Normal muscle strength    Left Knee   Flexion: 4+  Extension: 4+    Right Knee   Normal strength    Left Ankle/Foot   Dorsiflexion: 4+  Plantar flexion: 4+  Inversion: 4+  Eversion: 4+    Right Ankle/Foot   Normal strength    Tests   Left Ankle/Foot   Negative for Homans' sign and Varela       Ambulation   Weight-Bearing Status   Assistive device used: none    Additional Weight-Bearing Status Details  No abnormalities during ambulation  Pt reports no discomfort in calf during short distance walks           Precautions: HTN- controlled      Daily Treatment Diary:      Initial Evaluation Date: 06/30/20  Compliance 6/30 7/2 7/7 7/9 8/4             Visit Number 1 2   3 4  5             Re-Eval  IE -  -  -  -          MC   Foto Captured Y -  -  -  Y                6/30 7/2 7/7 7/9 8/4             Manual                      STM post  Calf L   10'   10'  10'  10'             Ankle PROM   5'   -  -                                     Ther-Ex                      bike   L1 3'   L1 4' held  held             Ankle ABC's HEP  2x   2x  2x  2x              Standing calf stretch - both HEP  20"x5   20" x 5  20" x 5  np             Standing HR/TR HEP  x30   30x   30x   30x on foam             Ankle PF    purple TB 20x   purple TB 20x   purple TB 20x              Standing marches    30x   30x   30x             Mini squats     nv  2x10  -             Seated calf stretch        3x30"             LAQ        #1 5 20x                                    Neuro Re-Ed                                                                                                Ther-Act                                                               Modalities                      CP prn   def Def  Def  Def

## 2020-11-05 ENCOUNTER — OFFICE VISIT (OUTPATIENT)
Dept: URGENT CARE | Facility: CLINIC | Age: 85
End: 2020-11-05
Payer: COMMERCIAL

## 2020-11-05 VITALS
WEIGHT: 155 LBS | BODY MASS INDEX: 28.52 KG/M2 | TEMPERATURE: 97.4 F | OXYGEN SATURATION: 97 % | SYSTOLIC BLOOD PRESSURE: 172 MMHG | HEIGHT: 62 IN | DIASTOLIC BLOOD PRESSURE: 74 MMHG | RESPIRATION RATE: 18 BRPM | HEART RATE: 84 BPM

## 2020-11-05 DIAGNOSIS — R30.0 BURNING WITH URINATION: Primary | ICD-10-CM

## 2020-11-05 LAB
SL AMB  POCT GLUCOSE, UA: NEGATIVE
SL AMB LEUKOCYTE ESTERASE,UA: NORMAL
SL AMB POCT BILIRUBIN,UA: NEGATIVE
SL AMB POCT BLOOD,UA: NORMAL
SL AMB POCT CLARITY,UA: NORMAL
SL AMB POCT COLOR,UA: YELLOW
SL AMB POCT KETONES,UA: NEGATIVE
SL AMB POCT NITRITE,UA: NEGATIVE
SL AMB POCT PH,UA: 6
SL AMB POCT SPECIFIC GRAVITY,UA: 1.02
SL AMB POCT URINE PROTEIN: NORMAL
SL AMB POCT UROBILINOGEN: NEGATIVE

## 2020-11-05 PROCEDURE — 87086 URINE CULTURE/COLONY COUNT: CPT | Performed by: NURSE PRACTITIONER

## 2020-11-05 PROCEDURE — 99213 OFFICE O/P EST LOW 20 MIN: CPT | Performed by: NURSE PRACTITIONER

## 2020-11-05 PROCEDURE — 81002 URINALYSIS NONAUTO W/O SCOPE: CPT | Performed by: NURSE PRACTITIONER

## 2020-11-05 RX ORDER — CIPROFLOXACIN 500 MG/1
500 TABLET, FILM COATED ORAL EVERY 12 HOURS SCHEDULED
Qty: 10 TABLET | Refills: 0 | Status: SHIPPED | OUTPATIENT
Start: 2020-11-05 | End: 2020-11-10

## 2020-11-05 RX ORDER — ZINC GLUCONATE 50 MG
50 TABLET ORAL DAILY
COMMUNITY

## 2020-11-06 LAB — BACTERIA UR CULT: NORMAL

## 2021-03-12 ENCOUNTER — OFFICE VISIT (OUTPATIENT)
Dept: URGENT CARE | Facility: CLINIC | Age: 86
End: 2021-03-12
Payer: COMMERCIAL

## 2021-03-12 VITALS
BODY MASS INDEX: 28.52 KG/M2 | HEART RATE: 84 BPM | DIASTOLIC BLOOD PRESSURE: 77 MMHG | WEIGHT: 155 LBS | RESPIRATION RATE: 19 BRPM | TEMPERATURE: 97.8 F | HEIGHT: 62 IN | SYSTOLIC BLOOD PRESSURE: 164 MMHG | OXYGEN SATURATION: 96 %

## 2021-03-12 DIAGNOSIS — N30.00 ACUTE CYSTITIS WITHOUT HEMATURIA: Primary | ICD-10-CM

## 2021-03-12 LAB
SL AMB  POCT GLUCOSE, UA: NEGATIVE
SL AMB LEUKOCYTE ESTERASE,UA: ABNORMAL
SL AMB POCT BILIRUBIN,UA: NEGATIVE
SL AMB POCT BLOOD,UA: ABNORMAL
SL AMB POCT CLARITY,UA: ABNORMAL
SL AMB POCT COLOR,UA: YELLOW
SL AMB POCT KETONES,UA: NEGATIVE
SL AMB POCT NITRITE,UA: NEGATIVE
SL AMB POCT PH,UA: 6
SL AMB POCT SPECIFIC GRAVITY,UA: 1.02
SL AMB POCT URINE PROTEIN: ABNORMAL
SL AMB POCT UROBILINOGEN: 0.2

## 2021-03-12 PROCEDURE — 87086 URINE CULTURE/COLONY COUNT: CPT | Performed by: EMERGENCY MEDICINE

## 2021-03-12 PROCEDURE — 81002 URINALYSIS NONAUTO W/O SCOPE: CPT | Performed by: EMERGENCY MEDICINE

## 2021-03-12 PROCEDURE — 99213 OFFICE O/P EST LOW 20 MIN: CPT | Performed by: EMERGENCY MEDICINE

## 2021-03-12 RX ORDER — NITROFURANTOIN 25; 75 MG/1; MG/1
100 CAPSULE ORAL 2 TIMES DAILY
Qty: 14 CAPSULE | Refills: 0 | Status: SHIPPED | OUTPATIENT
Start: 2021-03-12 | End: 2021-03-19

## 2021-03-12 NOTE — PATIENT INSTRUCTIONS
Urinary Tract Infection in Women, Ambulatory Care   GENERAL INFORMATION:   A urinary tract infection (UTI)  is caused by bacteria that get inside your urinary tract  Most bacteria that enter your urinary tract are expelled when you urinate  If the bacteria stay in your urinary tract, you may get an infection  Your urinary tract includes your kidneys, ureters, bladder, and urethra  Urine is made in your kidneys, and it flows from the ureters to the bladder  Urine leaves the bladder through the urethra  A UTI is more common in your lower urinary tract, which includes your bladder and urethra  Common symptoms include the following:   · Urinating more often or waking from sleep to urinate    · Pain or burning when you urinate    · Pain or pressure in your lower abdomen     · Urine that smells bad    · Blood in your urine    · Leaking urine  Seek immediate care for the following symptoms:   · Urinating very little or not at all    · Vomiting    · Shaking chills with a fever    · Side or back pain that gets worse  Treatment for a UTI  may include medicines to treat a bacterial infection  You may also need medicines to decrease pain and burning, or decrease the urge to urinate often  Prevent a UTI:   · Urinate when you feel the urge  Do not hold your urine  Urinate as soon as you feel you have to  · Drink liquids as directed  Ask how much liquid to drink each day and which liquids are best for you  You may need to drink more fluids than usual to help flush out the bacteria  Do not drink alcohol, caffeine, and citrus juices  These can irritate your bladder and increase your symptoms  · Apply heat  on your abdomen for 20 to 30 minutes every 2 hours for as many days as directed  Heat helps decrease discomfort and pressure in your bladder  Follow up with your healthcare provider as directed:  Write down your questions so you remember to ask them during your visits     CARE AGREEMENT:   You have the right to help plan your care  Learn about your health condition and how it may be treated  Discuss treatment options with your caregivers to decide what care you want to receive  You always have the right to refuse treatment  The above information is an  only  It is not intended as medical advice for individual conditions or treatments  Talk to your doctor, nurse or pharmacist before following any medical regimen to see if it is safe and effective for you  © 2014 [address] is for End User's use only and may not be sold, redistributed or otherwise used for commercial purposes  All illustrations and images included in CareNotes® are the copyrighted property of [employer]  or Frankie Ramírez

## 2021-03-12 NOTE — PROGRESS NOTES
[facility]        NAME: An Hunt is a 80 y o  female  : 1931    MRN: [id number]  DATE: 2021  TIME: 1:07 PM    Assessment and Plan   Acute cystitis without hematuria [N30 00]  1  Acute cystitis without hematuria  POCT urine dip    Urine culture    nitrofurantoin (MACROBID) 100 mg capsule         Patient Instructions     Patient Instructions     Urinary Tract Infection in Women, Ambulatory Care   GENERAL INFORMATION:   A urinary tract infection (UTI)  is caused by bacteria that get inside your urinary tract  Most bacteria that enter your urinary tract are expelled when you urinate  If the bacteria stay in your urinary tract, you may get an infection  Your urinary tract includes your kidneys, ureters, bladder, and urethra  Urine is made in your kidneys, and it flows from the ureters to the bladder  Urine leaves the bladder through the urethra  A UTI is more common in your lower urinary tract, which includes your bladder and urethra  Common symptoms include the following:   · Urinating more often or waking from sleep to urinate    · Pain or burning when you urinate    · Pain or pressure in your lower abdomen     · Urine that smells bad    · Blood in your urine    · Leaking urine  Seek immediate care for the following symptoms:   · Urinating very little or not at all    · Vomiting    · Shaking chills with a fever    · Side or back pain that gets worse  Treatment for a UTI  may include medicines to treat a bacterial infection  You may also need medicines to decrease pain and burning, or decrease the urge to urinate often  Prevent a UTI:   · Urinate when you feel the urge  Do not hold your urine  Urinate as soon as you feel you have to  · Drink liquids as directed  Ask how much liquid to drink each day and which liquids are best for you  You may need to drink more fluids than usual to help flush out the bacteria  Do not drink alcohol, caffeine, and citrus juices   These can irritate your bladder and increase your symptoms  · Apply heat  on your abdomen for 20 to 30 minutes every 2 hours for as many days as directed  Heat helps decrease discomfort and pressure in your bladder  Follow up with your healthcare provider as directed:  Write down your questions so you remember to ask them during your visits  CARE AGREEMENT:   You have the right to help plan your care  Learn about your health condition and how it may be treated  Discuss treatment options with your caregivers to decide what care you want to receive  You always have the right to refuse treatment  The above information is an  only  It is not intended as medical advice for individual conditions or treatments  Talk to your doctor, nurse or pharmacist before following any medical regimen to see if it is safe and effective for you  © 2014 [address] is for End User's use only and may not be sold, redistributed or otherwise used for commercial purposes  All illustrations and images included in CareNotes® are the copyrighted property of [employer] M [employer]  or Frankie Ramírez  Follow up with PCP in 3-5 days  Proceed to  ER if symptoms worsen  Chief Complaint     Chief Complaint   Patient presents with    Abdominal Pain     Suprapubric pressure, Pain urination          History of Present Illness        Patient complains of burning on urination with increased frequency for the past day  She claims allergy to Bactrim but states Macrobid has worked in the past   She does not want Pyridium as she claims "she does not needed this time"  Review of Systems   Review of Systems   Constitutional: Negative for chills and fever  Genitourinary: Positive for dysuria, frequency and urgency  Negative for difficulty urinating, flank pain and hematuria  Musculoskeletal: Negative for back pain           Current Medications       Current Outpatient Medications:     aspirin 81 mg chewable tablet, Chew 81 mg daily, Disp: , Rfl:     co-enzyme Q-10 30 MG capsule, Take 30 mg by mouth 3 (three) times a day, Disp: , Rfl:     diazepam (VALIUM) 5 mg tablet, 5 mg every 6 (six) hours as needed , Disp: , Rfl:     docusate sodium (COLACE) 50 mg capsule, Take 100 mg by mouth 2 (two) times a day , Disp: , Rfl:     ELDERBERRY PO, Take by mouth, Disp: , Rfl:     lisinopril (ZESTRIL) 40 mg tablet, Take 40 mg by mouth daily , Disp: , Rfl:     simvastatin (ZOCOR) 10 mg tablet, 10 mg , Disp: , Rfl:     VITAMIN D, CHOLECALCIFEROL, PO, Take 2,000 Units by mouth, Disp: , Rfl:     zinc gluconate 50 mg tablet, Take 50 mg by mouth daily, Disp: , Rfl:     nitrofurantoin (MACROBID) 100 mg capsule, Take 1 capsule (100 mg total) by mouth 2 (two) times a day for 7 days, Disp: 14 capsule, Rfl: 0    omega-3-acid ethyl esters (LOVAZA) 1 g capsule, Take 2 g by mouth 2 (two) times a day, Disp: , Rfl:     pantoprazole (PROTONIX) 40 mg tablet, Take 1 tablet (40 mg total) by mouth daily for 20 days, Disp: 20 tablet, Rfl: 0    phenazopyridine (PYRIDIUM) 200 mg tablet, Take 1 tablet (200 mg total) by mouth 3 (three) times a day with meals (Patient not taking: Reported on 11/5/2020), Disp: 10 tablet, Rfl: 0    saccharomyces boulardii (FLORASTOR) 250 mg capsule, Take 250 mg by mouth 2 (two) times a day, Disp: , Rfl:     Current Allergies     Allergies as of 03/12/2021 - Reviewed 03/12/2021   Allergen Reaction Noted    Bactrim [sulfamethoxazole-trimethoprim] Other (See Comments) 07/26/2020    Ibuprofen Other (See Comments) 10/03/2012            The following portions of the patient's history were reviewed and updated as appropriate: allergies, current medications, past family history, past medical history, past social history, past surgical history and problem list      Past Medical History:   Diagnosis Date    Arthritis     High cholesterol     Hypertension     Urinary tract infection        Past Surgical History: Procedure Laterality Date    APPENDECTOMY      CHOLECYSTECTOMY      HIP SURGERY      HYSTERECTOMY      JOINT REPLACEMENT      KNEE SURGERY         Family History   Problem Relation Age of Onset    Cancer Mother     Stroke Father          Medications have been verified  Objective   /77   Pulse 84   Temp 97 8 °F (36 6 °C)   Resp 19   Ht 5' 2" (1 575 m)   Wt 70 3 kg (155 lb)   SpO2 96%   BMI 28 35 kg/m²        Physical Exam     Physical Exam  Vitals signs and nursing note reviewed  Constitutional:       General: She is not in acute distress  Appearance: She is well-developed  Abdominal:      General: Bowel sounds are normal  There is no distension  Palpations: Abdomen is soft  There is no mass  Tenderness: There is abdominal tenderness in the suprapubic area  There is no right CVA tenderness, left CVA tenderness, guarding or rebound  Skin:     General: Skin is warm and dry  Findings: No rash  Neurological:      Mental Status: She is alert and oriented to person, place, and time  Psychiatric:         Behavior: Behavior normal          Thought Content:  Thought content normal          Judgment: Judgment normal

## 2021-03-14 LAB
BACTERIA UR CULT: ABNORMAL
BACTERIA UR CULT: ABNORMAL

## 2021-03-16 ENCOUNTER — IMMUNIZATIONS (OUTPATIENT)
Dept: FAMILY MEDICINE CLINIC | Facility: HOSPITAL | Age: 86
End: 2021-03-16

## 2021-03-16 DIAGNOSIS — Z23 ENCOUNTER FOR IMMUNIZATION: Primary | ICD-10-CM

## 2021-03-16 PROCEDURE — 91300 SARS-COV-2 / COVID-19 MRNA VACCINE (PFIZER-BIONTECH) 30 MCG: CPT

## 2021-03-16 PROCEDURE — 0001A SARS-COV-2 / COVID-19 MRNA VACCINE (PFIZER-BIONTECH) 30 MCG: CPT

## 2021-04-07 ENCOUNTER — OFFICE VISIT (OUTPATIENT)
Dept: URGENT CARE | Facility: CLINIC | Age: 86
End: 2021-04-07
Payer: COMMERCIAL

## 2021-04-07 VITALS
HEIGHT: 62 IN | DIASTOLIC BLOOD PRESSURE: 69 MMHG | WEIGHT: 155 LBS | BODY MASS INDEX: 28.52 KG/M2 | SYSTOLIC BLOOD PRESSURE: 151 MMHG | HEART RATE: 73 BPM | TEMPERATURE: 97 F

## 2021-04-07 DIAGNOSIS — N30.01 ACUTE CYSTITIS WITH HEMATURIA: Primary | ICD-10-CM

## 2021-04-07 LAB
SL AMB  POCT GLUCOSE, UA: NEGATIVE
SL AMB LEUKOCYTE ESTERASE,UA: ABNORMAL
SL AMB POCT BILIRUBIN,UA: NEGATIVE
SL AMB POCT BLOOD,UA: 1
SL AMB POCT CLARITY,UA: ABNORMAL
SL AMB POCT COLOR,UA: YELLOW
SL AMB POCT KETONES,UA: ABNORMAL
SL AMB POCT NITRITE,UA: NEGATIVE
SL AMB POCT PH,UA: 5
SL AMB POCT SPECIFIC GRAVITY,UA: 1.01
SL AMB POCT URINE PROTEIN: ABNORMAL
SL AMB POCT UROBILINOGEN: NEGATIVE

## 2021-04-07 PROCEDURE — 99213 OFFICE O/P EST LOW 20 MIN: CPT | Performed by: PHYSICIAN ASSISTANT

## 2021-04-07 PROCEDURE — 87086 URINE CULTURE/COLONY COUNT: CPT | Performed by: PHYSICIAN ASSISTANT

## 2021-04-07 RX ORDER — CEPHALEXIN 500 MG/1
500 CAPSULE ORAL EVERY 12 HOURS SCHEDULED
Qty: 14 CAPSULE | Refills: 0 | Status: SHIPPED | OUTPATIENT
Start: 2021-04-07 | End: 2021-04-14

## 2021-04-07 NOTE — PROGRESS NOTES
[facility]        NAME: Irene Montesinos is a 80 y o  female  : 1931    MRN: [id number]  DATE: 2021  TIME: 10:02 AM    Assessment and Plan   Acute cystitis with hematuria [N30 01]  1  Acute cystitis with hematuria  POCT urine dip auto non-scope    Ambulatory referral to Urology    cephalexin (KEFLEX) 500 mg capsule    Urine culture       Advised patient to make appointment with urology due to recurrent UTIs  Patient states she has had them all her life and has seen Urology once  They did not find any abnormalities  She reports "I am old enough that it is what it is and I am okay with that"    Patient Instructions   Take antibiotic as prescribed  Complete full dose of antibiotics even if symptoms begin to improve or resolve  May use OTC Tylenol for fever  DRINK LOTS OF FLUIDS  Observe for signs of worsening infection including increased pain, discharge, blood in the urine, back or flank pain, fever or chills, or persistent symptoms  Your symptoms should begin to improve over the next couple days  Follow-up with your PCP in 3-5 days if symptoms worsen or do not improve  Go to ER if symptoms become severe  Follow up with PCP in 3-5 days  Proceed to  ER if symptoms worsen  Chief Complaint     Chief Complaint   Patient presents with    Urinary Tract Infection     Patient states that she has a UTI  Burning and frequency when urinating  that started overnight  History of Present Illness        Patient is an 79-year-old female with significant past medical history of hypertension, hyperlipidemia, and recurrent UTIs presents the office complaining of urinary frequency and urgency since yesterday  Patient was recently treated for UTI 3 weeks ago with Macrobid  Urinary Tract Infection   This is a new problem  The current episode started yesterday  The problem occurs every urination  The problem has been gradually worsening   The quality of the pain is described as burning  There has been no fever  Associated symptoms include frequency and urgency  Pertinent negatives include no chills, discharge, flank pain, hematuria, hesitancy, nausea or vomiting  Her past medical history is significant for recurrent UTIs (since teens)  hx of UTIs       Review of Systems   Review of Systems   Constitutional: Negative for chills and fever  Gastrointestinal: Negative for abdominal pain, diarrhea, nausea and vomiting  Genitourinary: Positive for dysuria (mild), frequency and urgency  Negative for decreased urine volume, difficulty urinating, enuresis, flank pain, hematuria, hesitancy, pelvic pain, vaginal bleeding, vaginal discharge and vaginal pain  Skin: Negative for rash           Current Medications       Current Outpatient Medications:     aspirin 81 mg chewable tablet, Chew 81 mg daily, Disp: , Rfl:     cephalexin (KEFLEX) 500 mg capsule, Take 1 capsule (500 mg total) by mouth every 12 (twelve) hours for 7 days, Disp: 14 capsule, Rfl: 0    co-enzyme Q-10 30 MG capsule, Take 30 mg by mouth 3 (three) times a day, Disp: , Rfl:     diazepam (VALIUM) 5 mg tablet, 5 mg every 6 (six) hours as needed , Disp: , Rfl:     docusate sodium (COLACE) 50 mg capsule, Take 100 mg by mouth 2 (two) times a day , Disp: , Rfl:     ELDERBERRY PO, Take by mouth, Disp: , Rfl:     lisinopril (ZESTRIL) 40 mg tablet, Take 40 mg by mouth daily , Disp: , Rfl:     omega-3-acid ethyl esters (LOVAZA) 1 g capsule, Take 2 g by mouth 2 (two) times a day, Disp: , Rfl:     pantoprazole (PROTONIX) 40 mg tablet, Take 1 tablet (40 mg total) by mouth daily for 20 days, Disp: 20 tablet, Rfl: 0    phenazopyridine (PYRIDIUM) 200 mg tablet, Take 1 tablet (200 mg total) by mouth 3 (three) times a day with meals (Patient not taking: Reported on 11/5/2020), Disp: 10 tablet, Rfl: 0    saccharomyces boulardii (FLORASTOR) 250 mg capsule, Take 250 mg by mouth 2 (two) times a day, Disp: , Rfl:     simvastatin (ZOCOR) 10 mg tablet, 10 mg , Disp: , Rfl:     VITAMIN D, CHOLECALCIFEROL, PO, Take 2,000 Units by mouth, Disp: , Rfl:     zinc gluconate 50 mg tablet, Take 50 mg by mouth daily, Disp: , Rfl:     Current Allergies     Allergies as of 04/07/2021 - Reviewed 04/07/2021   Allergen Reaction Noted    Bactrim [sulfamethoxazole-trimethoprim] Other (See Comments) 07/26/2020    Ibuprofen Other (See Comments) 10/03/2012            The following portions of the patient's history were reviewed and updated as appropriate: allergies, current medications, past family history, past medical history, past social history, past surgical history and problem list      Past Medical History:   Diagnosis Date    Arthritis     High cholesterol     Hypertension     Urinary tract infection        Past Surgical History:   Procedure Laterality Date    APPENDECTOMY      CHOLECYSTECTOMY      HIP SURGERY      HYSTERECTOMY      JOINT REPLACEMENT      KNEE SURGERY         Family History   Problem Relation Age of Onset    Cancer Mother     Stroke Father          Medications have been verified  Objective   /69 (BP Location: Right leg, Patient Position: Sitting, Cuff Size: Standard)   Pulse 73   Temp (!) 97 °F (36 1 °C)   Ht 5' 2" (1 575 m)   Wt 70 3 kg (155 lb)   BMI 28 35 kg/m²   No LMP recorded  Patient has had a hysterectomy  Physical Exam     Physical Exam  Vitals signs and nursing note reviewed  Constitutional:       Appearance: Normal appearance  She is well-developed  HENT:      Head: Normocephalic and atraumatic  Right Ear: External ear normal       Left Ear: External ear normal       Nose: Nose normal    Eyes:      General: Lids are normal    Cardiovascular:      Rate and Rhythm: Normal rate and regular rhythm  Pulses: Normal pulses  Heart sounds: Normal heart sounds  No murmur  No friction rub  No gallop      Pulmonary:      Effort: Pulmonary effort is normal       Breath sounds: Normal breath sounds  No wheezing, rhonchi or rales  Abdominal:      General: Bowel sounds are normal       Palpations: Abdomen is soft  Tenderness: There is no abdominal tenderness  There is no right CVA tenderness or left CVA tenderness  Musculoskeletal: Normal range of motion  Lymphadenopathy:      Cervical: No cervical adenopathy  Skin:     General: Skin is warm and dry  Capillary Refill: Capillary refill takes less than 2 seconds  Neurological:      Mental Status: She is alert          urine dip:    COLOR,UA yellow    CLARITY,UA cloudy    SPECIFIC GRAVITY,UA 1 015     PH,UA 5 0    LEUKOCYTE ESTERASE,UA moderate    NITRITE,UA negative    GLUCOSE, UA negative    KETONES,UA trace    BILIRUBIN,UA negative    BLOOD,UA 1    POCT URINE PROTEIN trace    SL AMB POCT UROBILINOGEN negative

## 2021-04-07 NOTE — PATIENT INSTRUCTIONS
Take antibiotic as prescribed  Complete full dose of antibiotics even if symptoms begin to improve or resolve  May use OTC Tylenol for fever  DRINK LOTS OF FLUIDS  Observe for signs of worsening infection including increased pain, discharge, blood in the urine, back or flank pain, fever or chills, or persistent symptoms  Your symptoms should begin to improve over the next couple days  Follow-up with your PCP in 3-5 days if symptoms worsen or do not improve  Go to ER if symptoms become severe  Urinary Tract Infection in Women   WHAT YOU NEED TO KNOW:   A urinary tract infection (UTI) is caused by bacteria that get inside your urinary tract  Most bacteria that enter your urinary tract come out when you urinate  If the bacteria stay in your urinary tract, you may get an infection  Your urinary tract includes your kidneys, ureters, bladder, and urethra  Urine is made in your kidneys, and it flows from the ureters to the bladder  Urine leaves the bladder through the urethra  A UTI is more common in your lower urinary tract, which includes your bladder and urethra  DISCHARGE INSTRUCTIONS:   Return to the emergency department if:   · You are urinating very little or not at all  · You have a high fever with shaking chills  · You have side or back pain that gets worse  Call your doctor if:   · You have a fever  · You do not feel better after 2 days of taking antibiotics  · You are vomiting  · You have questions or concerns about your condition or care  Medicines:   · Antibiotics  help fight a bacterial infection  If you have UTIs often (called recurrent UTIs), you may be given antibiotics to take regularly  You will be given directions for when and how to use antibiotics  The goal is to prevent UTIs but not cause antibiotic resistance by using antibiotics too often  · Medicines  may be given to decrease pain and burning when you urinate   They will also help decrease the feeling that you need to urinate often  These medicines will make your urine orange or red  · Take your medicine as directed  Contact your healthcare provider if you think your medicine is not helping or if you have side effects  Tell him or her if you are allergic to any medicine  Keep a list of the medicines, vitamins, and herbs you take  Include the amounts, and when and why you take them  Bring the list or the pill bottles to follow-up visits  Carry your medicine list with you in case of an emergency  Follow up with your healthcare provider as directed:  Write down your questions so you remember to ask them during your visits  Prevent another UTI:   · Empty your bladder often  Urinate and empty your bladder as soon as you feel the need  Do not hold your urine for long periods of time  · Wipe from front to back after you urinate or have a bowel movement  This will help prevent germs from getting into your urinary tract through your urethra  · Drink liquids as directed  Ask how much liquid to drink each day and which liquids are best for you  You may need to drink more liquids than usual to help flush out the bacteria  Do not drink alcohol, caffeine, or citrus juices  These can irritate your bladder and increase your symptoms  Your healthcare provider may recommend cranberry juice to help prevent a UTI  · Urinate after you have sex  This can help flush out bacteria passed during sex  · Do not douche or use feminine deodorants  These can change the chemical balance in your vagina  · Change sanitary pads or tampons often  This will help prevent germs from getting into your urinary tract  · Talk to your healthcare provider about your birth control method  You may need to change your method if it is increasing your risk for UTIs  · Wear cotton underwear and clothes that are loose  Tight pants and nylon underwear can trap moisture and cause bacteria to grow      · Vaginal estrogen may be recommended  This medicine helps prevent UTIs in women who have gone through menopause or are in shira-menopause  · Do pelvic muscle exercises often  Pelvic muscle exercises may help you start and stop urinating  Strong pelvic muscles may help you empty your bladder easier  Squeeze these muscles tightly for 5 seconds like you are trying to hold back urine  Then relax for 5 seconds  Gradually work up to squeezing for 10 seconds  Do 3 sets of 15 repetitions a day, or as directed  © Copyright [facility] [address] Information is for End User's use only and may not be sold, redistributed or otherwise used for commercial purposes  All illustrations and images included in CareNotes® are the copyrighted property of A D A M [employer] Inc  or [address] [place] [address]  The above information is an  only  It is not intended as medical advice for individual conditions or treatments  Talk to your doctor, nurse or pharmacist before following any medical regimen to see if it is safe and effective for you

## 2021-04-08 ENCOUNTER — IMMUNIZATIONS (OUTPATIENT)
Dept: FAMILY MEDICINE CLINIC | Facility: HOSPITAL | Age: 86
End: 2021-04-08

## 2021-04-08 DIAGNOSIS — Z23 ENCOUNTER FOR IMMUNIZATION: Primary | ICD-10-CM

## 2021-04-08 PROCEDURE — 91300 SARS-COV-2 / COVID-19 MRNA VACCINE (PFIZER-BIONTECH) 30 MCG: CPT

## 2021-04-08 PROCEDURE — 0002A SARS-COV-2 / COVID-19 MRNA VACCINE (PFIZER-BIONTECH) 30 MCG: CPT

## 2021-04-09 LAB — BACTERIA UR CULT: NORMAL

## 2021-08-23 ENCOUNTER — EVALUATION (OUTPATIENT)
Dept: PHYSICAL THERAPY | Facility: CLINIC | Age: 86
End: 2021-08-23
Payer: COMMERCIAL

## 2021-08-23 DIAGNOSIS — M25.552 LEFT HIP PAIN: Primary | ICD-10-CM

## 2021-08-23 PROCEDURE — 97161 PT EVAL LOW COMPLEX 20 MIN: CPT | Performed by: PHYSICAL THERAPIST

## 2021-08-23 PROCEDURE — 97110 THERAPEUTIC EXERCISES: CPT | Performed by: PHYSICAL THERAPIST

## 2021-08-23 NOTE — LETTER
2021    Oleg Camacho, [address]    Patient: Leo Salamanca   YOB: 1931   Date of Visit: 2021     Encounter Diagnosis     ICD-10-CM    1  Left hip pain  M25 552        Dear Dr Genoveva Archuleta:    Thank you for your recent referral of Leo Salamanca  Please review the attached evaluation summary from Bibi's recent visit  Please verify that you agree with the plan of care by signing the attached order  If you have any questions or concerns, please do not hesitate to call  I sincerely appreciate the opportunity to share in the care of one of your patients and hope to have another opportunity to work with you in the near future  Sincerely,    Ben Best, PT      Referring Provider:      I certify that I have read the below Plan of Care and certify the need for these services furnished under this plan of treatment while under my care  Oleg Camacho MD  [address]  Via Fax: [phone]          PT Evaluation     Today's date: 2021  Patient name: Leo Salamanca  : 1931  MRN: [id number]  Referring provider: Dominik Nam MD  Dx:   Encounter Diagnosis     ICD-10-CM    1  Left hip pain  M25 552                   Assessment  Assessment details: Leo Salamanca is a 80year old  presenting to PT with chief complaint of hip pain starting ~ 2 months ago  Pt reports no trauma or injury to area, that her pain started out of the blue  Upon examination, patient was found to have objective deficits as listed below and is displaying ss consistent with hip bursitis  Pt is currently experiencing functional deficits including pain with ambulation, stair climbing and ADL's  Patient would benefit from skilled PT services to address these impairments and to maximize function in order to improve quality of life   Thank you for the referral   Impairments: abnormal gait, abnormal or restricted ROM, activity intolerance, impaired balance, impaired physical strength, lacks appropriate home exercise program and pain with function    Symptom irritability: moderateUnderstanding of Dx/Px/POC: excellent  Goals  STG  1) L hip ROM will improve to [facility] in 2 weeks  2) L hip strength will improve 1/2 grade in 2 weeks  3) Pt will decrease pain levels by 2-3 at its worst in 2 weeks  LTG  1) Patient is independent in HEP  2) Patient will ascend/descend one flight of stairs independently with no increased pain in 4 weeks  3) Pt will abolish knee pain to improve QOL by DC  Plan  Patient would benefit from: skilled physical therapy  Planned modality interventions: cryotherapy  Planned therapy interventions: home exercise program, flexibility, therapeutic exercise, therapeutic activities, stretching, strengthening, patient education, neuromuscular re-education, body mechanics training, balance, massage, manual therapy and abdominal trunk stabilization  Frequency: 1x week (pt has a high copay)  Duration in weeks: 4  Treatment plan discussed with: patient        Subjective Evaluation    History of Present Illness  Mechanism of injury: Pt notes that she has been having L hip pain for about 6 months now  She started going to pain management and got injections which did provide some releif (this last one a month ago)  Her doctor dx her bursitis and said to try PT  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 and had no problems since her surgery  She notes that she can do her household work and chores, just takes increased time  She has been icing which has been helping  She notes that she was getting some pain sleeping, but that continues to improve  She does have difficulty with stairs, she goes 1 step at a time because she is not sure if she can do it      Quality of life: excellent    Pain  Current pain ratin  At best pain ratin  At worst pain ratin  Location: L hip joint  Quality: sharp  Aggravating factors: sitting, standing, walking and stair climbing    Social Support  Steps to enter house: yes  Stairs in house: yes (steps to her basement/garage)   Lives in: [place]  Lives with: alone    Employment status: not working    Diagnostic Tests  X-ray: normal  Treatments  No previous or current treatments  Patient Goals  Patient goals for therapy: decreased pain, increased motion, increased strength and independence with ADLs/IADLs          Objective     Palpation   Left   No palpable tenderness to the gluteus polly and gluteus medius  Tenderness     Left Hip   Tenderness in the greater trochanter  Neurological Testing     Sensation     Hip   Left Hip   Intact: light touch    Right Hip   Intact: light touch    Active Range of Motion   Left Hip   Flexion: 100 degrees   Abduction: WFL  External rotation (90/90): WFL and with pain  Internal rotation (90/90): WFL and with pain    Right Hip   Normal active range of motion    Strength/Myotome Testing     Left Hip   Planes of Motion   Flexion: 4-  Abduction: 4-  Adduction: 4  External rotation: 4-  Internal rotation: 4-    Right Hip   Planes of Motion   Flexion: 4  Abduction: 4  Adduction: 4  External rotation: 4  Internal rotation: 4    Left Knee   Flexion: 4-  Extension: 3+    Right Knee   Flexion: 4-  Extension: 4-    Tests     Left Hip   Positive ELDA  Negative scour       Additional Tests Details  (+) for pain on lateral aspect of L hip (bursa)       Precautions: HTN     Daily Treatment Diary:      Initial Evaluation Date: 08/23/21  Compliance 8/23                     Visit Number 1                    Re-Eval  IE                 [facility]   [employer] Captured Y                           8/23                     Manual                      Hip ROM -                     STM -                                           Ther-Ex                      Bike-warm up -                     HS stretch HEP                     Quad stretch -                     SLR- 4 ways Flex 10x                     Mini squats -                     Standing marches -                     LAQ -                     clamshells HEP                     bridge -                     HR/TR -            Hip fall out HEP            Hip add    HEP                                               Neuro Re-Ed                                                                                                Ther-Act              step ups -                     Lateral step ups -                          Modalities                      CP prn

## 2021-08-23 NOTE — PROGRESS NOTES
PT Evaluation / DC Summary     Today's date: 2021  Patient name: Cesar Avalos  : 1931  MRN: [id number]  Referring provider: Penelope Nash MD  Dx:   Encounter Diagnosis     ICD-10-CM    1  Left hip pain  M25 552                   Assessment  Assessment details: Cesar Avalos is a 80year old  presenting to PT with chief complaint of hip pain starting ~ 2 months ago  Pt reports no trauma or injury to area, that her pain started out of the blue  Upon examination, patient was found to have objective deficits as listed below and is displaying ss consistent with hip bursitis  Pt is currently experiencing functional deficits including pain with ambulation, stair climbing and ADL's  Patient would benefit from skilled PT services to address these impairments and to maximize function in order to improve quality of life  Thank you for the referral   Impairments: abnormal gait, abnormal or restricted ROM, activity intolerance, impaired balance, impaired physical strength, lacks appropriate home exercise program and pain with function    Symptom irritability: moderateUnderstanding of Dx/Px/POC: excellent  Goals  STG  1) L hip ROM will improve to [facility] in 2 weeks  2) L hip strength will improve 1/2 grade in 2 weeks  3) Pt will decrease pain levels by 2-3 at its worst in 2 weeks  LTG  1) Patient is independent in HEP  2) Patient will ascend/descend one flight of stairs independently with no increased pain in 4 weeks  3) Pt will abolish knee pain to improve QOL by DC         Plan  Patient would benefit from: skilled physical therapy  Planned modality interventions: cryotherapy  Planned therapy interventions: home exercise program, flexibility, therapeutic exercise, therapeutic activities, stretching, strengthening, patient education, neuromuscular re-education, body mechanics training, balance, massage, manual therapy and abdominal trunk stabilization  Frequency: 1x week (pt has a high copay)  Duration in weeks: 4  Treatment plan discussed with: patient        Subjective Evaluation    History of Present Illness  Mechanism of injury: Pt notes that she has been having L hip pain for about 6 months now  She started going to pain management and got injections which did provide some releif (this last one a month ago)  Her doctor dx her bursitis and said to try PT  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 and had no problems since her surgery  She notes that she can do her household work and chores, just takes increased time  She has been icing which has been helping  She notes that she was getting some pain sleeping, but that continues to improve  She does have difficulty with stairs, she goes 1 step at a time because she is not sure if she can do it  Quality of life: excellent    Pain  Current pain ratin  At best pain ratin  At worst pain ratin  Location: L hip joint  Quality: sharp  Aggravating factors: sitting, standing, walking and stair climbing    Social Support  Steps to enter house: yes  Stairs in house: yes (steps to her basement/garage)   L[facility] in: [facility]  Lives with: alone    Employment status: not working    Diagnostic Tests  X-ray: normal  Treatments  No previous or current treatments  Patient Goals  Patient goals for therapy: decreased pain, increased motion, increased strength and independence with ADLs/IADLs          Objective     Palpation   Left   No palpable tenderness to the gluteus polly and gluteus medius  Tenderness     Left Hip   Tenderness in the greater trochanter       Neurological Testing     Sensation     Hip   Left Hip   Intact: light touch    Right Hip   Intact: light touch    Active Range of Motion   Left Hip   Flexion: 100 degrees   Abduction: WFL  External rotation (90/90): WFL and with pain  Internal rotation (90/90): WFL and with pain    Right Hip   Normal active range of motion    Strength/Myotome Testing     Left Hip   Planes of Motion Flexion: 4-  Abduction: 4-  Adduction: 4  External rotation: 4-  Internal rotation: 4-    Right Hip   Planes of Motion   Flexion: 4  Abduction: 4  Adduction: 4  External rotation: 4  Internal rotation: 4    Left Knee   Flexion: 4-  Extension: 3+    Right Knee   Flexion: 4-  Extension: 4-    Tests     Left Hip   Positive ELDA  Negative scour  Additional Tests Details  (+) for pain on lateral aspect of L hip (bursa)       Precautions: HTN     Daily Treatment Diary:      Initial Evaluation Date: 08/23/21  Compliance 8/23                     Visit Number 1                    Re-Eval  IE                 [address]                           8/23                     Manual                      Hip ROM -                     STM -                                           Ther-Ex                      Bike-warm up -                     HS stretch HEP                     Quad stretch -                     SLR- 4 ways Flex 10x                     Mini squats -                     Standing marches -                     LAQ -                     clamshells HEP                     bridge -                     HR/TR -            Hip fall out HEP            Hip add  HEP                                               Neuro Re-Ed                                                                                                Ther-Act              step ups -                     Lateral step ups -                          Modalities                      CP prn                                                          DC Summary: Pt has not called to schedule any future appointments; at this time pt is a self DC from [facility]

## 2021-08-24 ENCOUNTER — OFFICE VISIT (OUTPATIENT)
Dept: URGENT CARE | Facility: CLINIC | Age: 86
End: 2021-08-24
Payer: COMMERCIAL

## 2021-08-24 VITALS
HEART RATE: 87 BPM | WEIGHT: 155 LBS | SYSTOLIC BLOOD PRESSURE: 134 MMHG | HEIGHT: 62 IN | TEMPERATURE: 97.6 F | DIASTOLIC BLOOD PRESSURE: 68 MMHG | BODY MASS INDEX: 28.52 KG/M2 | OXYGEN SATURATION: 96 % | RESPIRATION RATE: 18 BRPM

## 2021-08-24 DIAGNOSIS — N30.01 ACUTE CYSTITIS WITH HEMATURIA: Primary | ICD-10-CM

## 2021-08-24 LAB
SL AMB  POCT GLUCOSE, UA: NEGATIVE
SL AMB LEUKOCYTE ESTERASE,UA: ABNORMAL
SL AMB POCT BILIRUBIN,UA: NEGATIVE
SL AMB POCT BLOOD,UA: ABNORMAL
SL AMB POCT CLARITY,UA: ABNORMAL
SL AMB POCT COLOR,UA: YELLOW
SL AMB POCT KETONES,UA: NEGATIVE
SL AMB POCT NITRITE,UA: NEGATIVE
SL AMB POCT PH,UA: 6
SL AMB POCT SPECIFIC GRAVITY,UA: 1.01
SL AMB POCT URINE PROTEIN: NEGATIVE
SL AMB POCT UROBILINOGEN: NORMAL

## 2021-08-24 PROCEDURE — 87086 URINE CULTURE/COLONY COUNT: CPT | Performed by: PHYSICIAN ASSISTANT

## 2021-08-24 PROCEDURE — 81002 URINALYSIS NONAUTO W/O SCOPE: CPT | Performed by: PHYSICIAN ASSISTANT

## 2021-08-24 PROCEDURE — 99213 OFFICE O/P EST LOW 20 MIN: CPT | Performed by: PHYSICIAN ASSISTANT

## 2021-08-24 RX ORDER — CEPHALEXIN 500 MG/1
500 CAPSULE ORAL EVERY 12 HOURS SCHEDULED
Qty: 14 CAPSULE | Refills: 0 | Status: SHIPPED | OUTPATIENT
Start: 2021-08-24 | End: 2021-08-31

## 2021-08-24 NOTE — PATIENT INSTRUCTIONS
Take antibiotic as prescribed  Complete full dose of antibiotics even if symptoms begin to improve or resolve  May use OTC Tylenol for fever  DRINK LOTS OF FLUIDS  Observe for signs of worsening infection including increased pain, discharge, blood in the urine, back or flank pain, fever or chills, or persistent symptoms  Your symptoms should begin to improve over the next couple days  Follow-up with your PCP in 3-5 days if symptoms worsen or do not improve  Go to ER if symptoms become severe  Urinary Tract Infection in Women   WHAT YOU NEED TO KNOW:   A urinary tract infection (UTI) is caused by bacteria that get inside your urinary tract  Most bacteria that enter your urinary tract come out when you urinate  If the bacteria stay in your urinary tract, you may get an infection  Your urinary tract includes your kidneys, ureters, bladder, and urethra  Urine is made in your kidneys, and it flows from the ureters to the bladder  Urine leaves the bladder through the urethra  A UTI is more common in your lower urinary tract, which includes your bladder and urethra  DISCHARGE INSTRUCTIONS:   Return to the emergency department if:   · You are urinating very little or not at all  · You have a high fever with shaking chills  · You have side or back pain that gets worse  Call your doctor if:   · You have a fever  · You do not feel better after 2 days of taking antibiotics  · You are vomiting  · You have questions or concerns about your condition or care  Medicines:   · Antibiotics  help fight a bacterial infection  If you have UTIs often (called recurrent UTIs), you may be given antibiotics to take regularly  You will be given directions for when and how to use antibiotics  The goal is to prevent UTIs but not cause antibiotic resistance by using antibiotics too often  · Medicines  may be given to decrease pain and burning when you urinate   They will also help decrease the feeling that you need to urinate often  These medicines will make your urine orange or red  · Take your medicine as directed  Contact your healthcare provider if you think your medicine is not helping or if you have side effects  Tell him or her if you are allergic to any medicine  Keep a list of the medicines, vitamins, and herbs you take  Include the amounts, and when and why you take them  Bring the list or the pill bottles to follow-up visits  Carry your medicine list with you in case of an emergency  Follow up with your healthcare provider as directed:  Write down your questions so you remember to ask them during your visits  Prevent another UTI:   · Empty your bladder often  Urinate and empty your bladder as soon as you feel the need  Do not hold your urine for long periods of time  · Wipe from front to back after you urinate or have a bowel movement  This will help prevent germs from getting into your urinary tract through your urethra  · Drink liquids as directed  Ask how much liquid to drink each day and which liquids are best for you  You may need to drink more liquids than usual to help flush out the bacteria  Do not drink alcohol, caffeine, or citrus juices  These can irritate your bladder and increase your symptoms  Your healthcare provider may recommend cranberry juice to help prevent a UTI  · Urinate after you have sex  This can help flush out bacteria passed during sex  · Do not douche or use feminine deodorants  These can change the chemical balance in your vagina  · Change sanitary pads or tampons often  This will help prevent germs from getting into your urinary tract  · Talk to your healthcare provider about your birth control method  You may need to change your method if it is increasing your risk for UTIs  · Wear cotton underwear and clothes that are loose  Tight pants and nylon underwear can trap moisture and cause bacteria to grow      · Vaginal estrogen may be recommended  This medicine helps prevent UTIs in women who have gone through menopause or are in shira-menopause  · Do pelvic muscle exercises often  Pelvic muscle exercises may help you start and stop urinating  Strong pelvic muscles may help you empty your bladder easier  Squeeze these muscles tightly for 5 seconds like you are trying to hold back urine  Then relax for 5 seconds  Gradually work up to squeezing for 10 seconds  Do 3 sets of 15 repetitions a day, or as directed  © Copyright [address] 2021 Information is for End User's use only and may not be sold, redistributed or otherwise used for commercial purposes  All illustrations and images included in CareNotes® are the copyrighted property of A D A M , Inc  or [facility]   The above information is an  only  It is not intended as medical advice for individual conditions or treatments  Talk to your doctor, nurse or pharmacist before following any medical regimen to see if it is safe and effective for you

## 2021-08-24 NOTE — PROGRESS NOTES
[facility]        NAME: Melania Navarrete is a 80 y o  female  : 1931    MRN: [id number]  DATE: 2021  TIME: 9:53 AM    Assessment and Plan   Acute cystitis with hematuria [N30 01]  1  Acute cystitis with hematuria  POCT urine dip    Urine culture    Ambulatory referral to Urology    cephalexin (KEFLEX) 500 mg capsule     Reinforce the importance of follow-up with urology due to recurrent UTIs  Patient continues to state that she is " too old to care about that stuff and will just keep coming in for my symptoms "   Patient notes she does not want to have to take a pill on a daily basis just to prevent something  Discussed with patient that she is already coming in once a month and put on antibiotics for 1 week that maybe she would benefit from long-term prophylactic dose from Urology  Patient or states that she is not interested in understands the risk and benefits of recurrent UTIs  Patient Instructions   Take antibiotic as prescribed  Complete full dose of antibiotics even if symptoms begin to improve or resolve  May use OTC Tylenol for fever  DRINK LOTS OF FLUIDS  Observe for signs of worsening infection including increased pain, discharge, blood in the urine, back or flank pain, fever or chills, or persistent symptoms  Your symptoms should begin to improve over the next couple days  Follow up with PCP in 3-5 days  Proceed to  ER if symptoms worsen  Chief Complaint     Chief Complaint   Patient presents with    Possible UTI     bladder pain, increased frequency  at home test was positive         History of Present Illness       Patient is a 5year-old female with significant past medical history of hypertension, hyperlipidemia and frequent UTIs presents the office complaining of bladder pressure and urinary frequency for 1 day  Urinary Tract Infection   This is a recurrent problem  The current episode started in the past 7 days  The problem occurs every urination  The problem has been gradually worsening  There has been no fever  Associated symptoms include frequency and urgency (chronic)  Pertinent negatives include no chills, discharge, flank pain, hematuria, hesitancy, nausea or vomiting  Treatments tried: cranbery juice  Her past medical history is significant for recurrent UTIs  Review of Systems   Review of Systems   Constitutional: Negative for chills and fever  Gastrointestinal: Negative for abdominal pain, diarrhea, nausea and vomiting  Genitourinary: Positive for frequency, pelvic pain and urgency (chronic)  Negative for decreased urine volume, difficulty urinating, dysuria, enuresis, flank pain, hematuria, hesitancy, vaginal bleeding, vaginal discharge and vaginal pain  Skin: Negative for rash           Current Medications       Current Outpatient Medications:     aspirin 81 mg chewable tablet, Chew 81 mg daily, Disp: , Rfl:     co-enzyme Q-10 30 MG capsule, Take 30 mg by mouth 3 (three) times a day, Disp: , Rfl:     CRANBERRY PO, Take by mouth, Disp: , Rfl:     diazepam (VALIUM) 5 mg tablet, 5 mg every 6 (six) hours as needed , Disp: , Rfl:     docusate sodium (COLACE) 50 mg capsule, Take 100 mg by mouth 2 (two) times a day , Disp: , Rfl:     ELDERBERRY PO, Take by mouth, Disp: , Rfl:     GARLIC PO, Take by mouth, Disp: , Rfl:     lisinopril (ZESTRIL) 40 mg tablet, Take 40 mg by mouth daily , Disp: , Rfl:     omega-3-acid ethyl esters (LOVAZA) 1 g capsule, Take 2 g by mouth 2 (two) times a day, Disp: , Rfl:     saccharomyces boulardii (FLORASTOR) 250 mg capsule, Take 250 mg by mouth 2 (two) times a day, Disp: , Rfl:     simvastatin (ZOCOR) 10 mg tablet, 10 mg , Disp: , Rfl:     cephalexin (KEFLEX) 500 mg capsule, Take 1 capsule (500 mg total) by mouth every 12 (twelve) hours for 7 days, Disp: 14 capsule, Rfl: 0    pantoprazole (PROTONIX) 40 mg tablet, Take 1 tablet (40 mg total) by mouth daily for 20 days, Disp: 20 tablet, Rfl: 0   phenazopyridine (PYRIDIUM) 200 mg tablet, Take 1 tablet (200 mg total) by mouth 3 (three) times a day with meals (Patient not taking: Reported on 11/5/2020), Disp: 10 tablet, Rfl: 0    VITAMIN D, CHOLECALCIFEROL, PO, Take 2,000 Units by mouth (Patient not taking: Reported on 8/24/2021), Disp: , Rfl:     zinc gluconate 50 mg tablet, Take 50 mg by mouth daily (Patient not taking: Reported on 8/24/2021), Disp: , Rfl:     Current Allergies     Allergies as of 08/24/2021 - Reviewed 08/24/2021   Allergen Reaction Noted    Bactrim [sulfamethoxazole-trimethoprim] Other (See Comments) 07/26/2020    Ibuprofen Other (See Comments) 10/03/2012            The following portions of the patient's history were reviewed and updated as appropriate: allergies, current medications, past family history, past medical history, past social history, past surgical history and problem list      Past Medical History:   Diagnosis Date    Arthritis     High cholesterol     Hypertension     Urinary tract infection        Past Surgical History:   Procedure Laterality Date    APPENDECTOMY      CHOLECYSTECTOMY      HIP SURGERY      HYSTERECTOMY      JOINT REPLACEMENT      KNEE SURGERY         Family History   Problem Relation Age of Onset    Cancer Mother     Stroke Father          Medications have been verified  Objective   /68   Pulse 87   Temp 97 6 °F (36 4 °C)   Resp 18   Ht 5' 2" (1 575 m)   Wt 70 3 kg (155 lb)   SpO2 96%   BMI 28 35 kg/m²   No LMP recorded  Patient has had a hysterectomy  Physical Exam     Physical Exam  Vitals and nursing note reviewed  Constitutional:       Appearance: Normal appearance  She is well-developed  HENT:      Head: Normocephalic and atraumatic  Right Ear: External ear normal       Left Ear: External ear normal       Nose: Nose normal    Eyes:      General: Lids are normal    Cardiovascular:      Rate and Rhythm: Normal rate and regular rhythm        Pulses: Normal pulses  Heart sounds: Normal heart sounds  No murmur heard  No friction rub  No gallop  Pulmonary:      Effort: Pulmonary effort is normal       Breath sounds: Normal breath sounds  No wheezing, rhonchi or rales  Abdominal:      General: Bowel sounds are normal       Palpations: Abdomen is soft  Tenderness: There is no abdominal tenderness  There is no right CVA tenderness or left CVA tenderness  Musculoskeletal:         General: Normal range of motion  Lymphadenopathy:      Cervical: No cervical adenopathy  Skin:     General: Skin is warm and dry  Capillary Refill: Capillary refill takes less than 2 seconds  Neurological:      Mental Status: She is alert           Urine dip:    LEUKOCYTE ESTERASE,UA moderate    NITRITE,UA negative    SL AMB POCT UROBILINOGEN normal    POCT URINE PROTEIN negative     PH,UA 6 0    BLOOD,UA trace    SPECIFIC GRAVITY,UA 1 010    KETONES,UA negative    BILIRUBIN,UA negative    GLUCOSE, UA negative     COLOR,UA yellow    CLARITY,UA cloudy

## 2021-08-25 LAB — BACTERIA UR CULT: NORMAL

## 2022-01-19 ENCOUNTER — OFFICE VISIT (OUTPATIENT)
Dept: URGENT CARE | Facility: CLINIC | Age: 87
End: 2022-01-19
Payer: COMMERCIAL

## 2022-01-19 VITALS
OXYGEN SATURATION: 96 % | TEMPERATURE: 97.9 F | SYSTOLIC BLOOD PRESSURE: 185 MMHG | WEIGHT: 155 LBS | HEIGHT: 62 IN | BODY MASS INDEX: 28.52 KG/M2 | RESPIRATION RATE: 17 BRPM | DIASTOLIC BLOOD PRESSURE: 78 MMHG | HEART RATE: 85 BPM

## 2022-01-19 DIAGNOSIS — R05.1 ACUTE COUGH: Primary | ICD-10-CM

## 2022-01-19 PROCEDURE — U0005 INFEC AGEN DETEC AMPLI PROBE: HCPCS | Performed by: PHYSICIAN ASSISTANT

## 2022-01-19 PROCEDURE — U0003 INFECTIOUS AGENT DETECTION BY NUCLEIC ACID (DNA OR RNA); SEVERE ACUTE RESPIRATORY SYNDROME CORONAVIRUS 2 (SARS-COV-2) (CORONAVIRUS DISEASE [COVID-19]), AMPLIFIED PROBE TECHNIQUE, MAKING USE OF HIGH THROUGHPUT TECHNOLOGIES AS DESCRIBED BY CMS-2020-01-R: HCPCS | Performed by: PHYSICIAN ASSISTANT

## 2022-01-19 PROCEDURE — 99213 OFFICE O/P EST LOW 20 MIN: CPT | Performed by: PHYSICIAN ASSISTANT

## 2022-01-19 RX ORDER — AZITHROMYCIN 250 MG/1
TABLET, FILM COATED ORAL
Qty: 6 TABLET | Refills: 0 | Status: SHIPPED | OUTPATIENT
Start: 2022-01-19 | End: 2022-01-23

## 2022-01-19 NOTE — PROGRESS NOTES
[facility]        NAME: Vania Voss is a 80 y o  female  : 1931    MRN: [id number]  DATE: 2022  TIME: 2:04 PM    Assessment and Plan   Acute cough [R05 1]  1  Acute cough  COVID Only -Office Collect    azithromycin (ZITHROMAX) 250 mg tablet         Patient Instructions       Follow up with PCP in 3-5 days  Proceed to  ER if symptoms worsen  Chief Complaint     Chief Complaint   Patient presents with    COVID-19     Cough- ongoing for about a week          History of Present Illness       Patient is a 80year old female presenting to [facility] with a week history of cough  Patient reports cough is productive of green and yellow sputum  Patient denies any chest pain or shortness of breath  Patient is in no acute distress  Cough  This is a new problem  The current episode started in the past 7 days  The problem has been waxing and waning  The problem occurs every few minutes  The cough is productive of sputum and productive of purulent sputum  Pertinent negatives include no chest pain, chills, ear pain, fever, rash, sore throat or shortness of breath  Review of Systems   Review of Systems   Constitutional: Negative for chills and fever  HENT: Negative for ear pain and sore throat  Eyes: Negative for pain and visual disturbance  Respiratory: Positive for cough  Negative for chest tightness and shortness of breath  Cardiovascular: Negative for chest pain and palpitations  Gastrointestinal: Negative for abdominal pain and vomiting  Genitourinary: Negative for dysuria and hematuria  Musculoskeletal: Negative for arthralgias and back pain  Skin: Negative for color change and rash  Neurological: Negative for seizures and syncope  All other systems reviewed and are negative          Current Medications       Current Outpatient Medications:     aspirin 81 mg chewable tablet, Chew 81 mg daily, Disp: , Rfl:     co-enzyme Q-10 30 MG capsule, Take 30 mg by mouth 3 (three) times a day, Disp: , Rfl:     CRANBERRY PO, Take by mouth, Disp: , Rfl:     diazepam (VALIUM) 5 mg tablet, 5 mg every 6 (six) hours as needed , Disp: , Rfl:     docusate sodium (COLACE) 50 mg capsule, Take 100 mg by mouth 2 (two) times a day , Disp: , Rfl:     ELDERBERRY PO, Take by mouth, Disp: , Rfl:     GARLIC PO, Take by mouth, Disp: , Rfl:     simvastatin (ZOCOR) 10 mg tablet, 10 mg , Disp: , Rfl:     VITAMIN D, CHOLECALCIFEROL, PO, Take 2,000 Units by mouth  , Disp: , Rfl:     zinc gluconate 50 mg tablet, Take 50 mg by mouth daily  , Disp: , Rfl:     azithromycin (ZITHROMAX) 250 mg tablet, Take 2 tablets today then 1 tablet daily x 4 days, Disp: 6 tablet, Rfl: 0    lisinopril (ZESTRIL) 40 mg tablet, Take 40 mg by mouth daily , Disp: , Rfl:     omega-3-acid ethyl esters (LOVAZA) 1 g capsule, Take 2 g by mouth 2 (two) times a day, Disp: , Rfl:     pantoprazole (PROTONIX) 40 mg tablet, Take 1 tablet (40 mg total) by mouth daily for 20 days, Disp: 20 tablet, Rfl: 0    phenazopyridine (PYRIDIUM) 200 mg tablet, Take 1 tablet (200 mg total) by mouth 3 (three) times a day with meals (Patient not taking: Reported on 11/5/2020), Disp: 10 tablet, Rfl: 0    saccharomyces boulardii (FLORASTOR) 250 mg capsule, Take 250 mg by mouth 2 (two) times a day, Disp: , Rfl:     Current Allergies     Allergies as of 01/19/2022 - Reviewed 01/19/2022   Allergen Reaction Noted    Bactrim [sulfamethoxazole-trimethoprim] Other (See Comments) 07/26/2020    Ibuprofen Other (See Comments) 10/03/2012            The following portions of the patient's history were reviewed and updated as appropriate: allergies, current medications, past family history, past medical history, past social history, past surgical history and problem list      Past Medical History:   Diagnosis Date    Arthritis     High cholesterol     Hypertension     Urinary tract infection        Past Surgical History:   Procedure Laterality Date    APPENDECTOMY      CHOLECYSTECTOMY      HIP SURGERY      HYSTERECTOMY      JOINT REPLACEMENT      KNEE SURGERY         Family History   Problem Relation Age of Onset    Cancer Mother     Stroke Father          Medications have been verified  Objective   BP (!) 185/78   Pulse 85   Temp 97 9 °F (36 6 °C)   Resp 17   Ht 5' 2" (1 575 m)   Wt 70 3 kg (155 lb)   SpO2 96%   BMI 28 35 kg/m²   No LMP recorded  Patient has had a hysterectomy  Physical Exam     Physical Exam  Constitutional:       Appearance: Normal appearance  HENT:      Head: Normocephalic and atraumatic  Mouth/Throat:      Mouth: Mucous membranes are moist    Eyes:      Extraocular Movements: Extraocular movements intact  Conjunctiva/sclera: Conjunctivae normal       Pupils: Pupils are equal, round, and reactive to light  Cardiovascular:      Rate and Rhythm: Normal rate and regular rhythm  Pulmonary:      Effort: Respiratory distress present  Breath sounds: No wheezing, rhonchi or rales  Musculoskeletal:         General: Normal range of motion  Cervical back: Normal range of motion and neck supple  Skin:     General: Skin is warm and dry  Capillary Refill: Capillary refill takes less than 2 seconds  Neurological:      General: No focal deficit present  Mental Status: She is alert and oriented to person, place, and time     Psychiatric:         Mood and Affect: Mood normal          Behavior: Behavior normal

## 2022-01-19 NOTE — PATIENT INSTRUCTIONS
Monitor your symptoms, if symptoms worsen report to the ED  Increase oral hydration  Get plenty of rest   Take Motrin and Tylenol to reduce any fever/pain  This is Dr. Collins's patient.  Routed to Dr. Collins.

## 2022-01-20 ENCOUNTER — DOCUMENTATION (OUTPATIENT)
Dept: URGENT CARE | Facility: CLINIC | Age: 87
End: 2022-01-20

## 2022-01-20 LAB — SARS-COV-2 RNA RESP QL NAA+PROBE: NEGATIVE

## 2022-01-20 NOTE — PROGRESS NOTES
Patient called in to find out her Covid test results  Patient informed she is Negative    Bruce Arellano RN

## 2022-02-03 ENCOUNTER — OFFICE VISIT (OUTPATIENT)
Dept: URGENT CARE | Facility: CLINIC | Age: 87
End: 2022-02-03
Payer: COMMERCIAL

## 2022-02-03 VITALS
TEMPERATURE: 98.2 F | BODY MASS INDEX: 28.52 KG/M2 | DIASTOLIC BLOOD PRESSURE: 67 MMHG | RESPIRATION RATE: 17 BRPM | SYSTOLIC BLOOD PRESSURE: 113 MMHG | HEART RATE: 72 BPM | HEIGHT: 62 IN | WEIGHT: 155 LBS

## 2022-02-03 DIAGNOSIS — R30.9 PAINFUL URINATION: ICD-10-CM

## 2022-02-03 DIAGNOSIS — R31.9 URINARY TRACT INFECTION WITH HEMATURIA, SITE UNSPECIFIED: Primary | ICD-10-CM

## 2022-02-03 DIAGNOSIS — N39.0 URINARY TRACT INFECTION WITH HEMATURIA, SITE UNSPECIFIED: Primary | ICD-10-CM

## 2022-02-03 LAB
SL AMB  POCT GLUCOSE, UA: NEGATIVE
SL AMB LEUKOCYTE ESTERASE,UA: ABNORMAL
SL AMB POCT BILIRUBIN,UA: NEGATIVE
SL AMB POCT BLOOD,UA: ABNORMAL
SL AMB POCT CLARITY,UA: ABNORMAL
SL AMB POCT COLOR,UA: YELLOW
SL AMB POCT KETONES,UA: NEGATIVE
SL AMB POCT NITRITE,UA: NEGATIVE
SL AMB POCT PH,UA: 6.5
SL AMB POCT SPECIFIC GRAVITY,UA: 1.02
SL AMB POCT URINE PROTEIN: NEGATIVE
SL AMB POCT UROBILINOGEN: NEGATIVE

## 2022-02-03 PROCEDURE — 81002 URINALYSIS NONAUTO W/O SCOPE: CPT

## 2022-02-03 PROCEDURE — 99213 OFFICE O/P EST LOW 20 MIN: CPT

## 2022-02-03 PROCEDURE — 87086 URINE CULTURE/COLONY COUNT: CPT

## 2022-02-03 RX ORDER — NITROFURANTOIN 25; 75 MG/1; MG/1
100 CAPSULE ORAL 2 TIMES DAILY
Qty: 14 CAPSULE | Refills: 0 | Status: SHIPPED | OUTPATIENT
Start: 2022-02-03 | End: 2022-02-03

## 2022-02-03 RX ORDER — NITROFURANTOIN 25; 75 MG/1; MG/1
100 CAPSULE ORAL 2 TIMES DAILY
Qty: 10 CAPSULE | Refills: 0 | Status: SHIPPED | OUTPATIENT
Start: 2022-02-03 | End: 2022-02-08

## 2022-02-03 NOTE — PROGRESS NOTES
[facility]        NAME: Chris Fofana is a 80 y o  female  : 1931    MRN: [id number]  DATE: February 3, 2022  TIME: 8:53 AM    Assessment and Plan   Urinary tract infection with hematuria, site unspecified [N39 0, R31 9]  1  Urinary tract infection with hematuria, site unspecified  nitrofurantoin (MACROBID) 100 mg capsule    DISCONTINUED: nitrofurantoin (MACROBID) 100 mg capsule   2  Painful urination  Urine culture    POCT urine dip    CANCELED: POCT rapid strepA       Macrobid for UTI for patient  Upon review of chart last eat positive urine culture was treated with Macrobid and patient expressed resolution of symptoms with this medication  Will follow with culture  Patient Instructions   Take macrobid twice a day for 5 days  Will follow up with urine culture  1  Start antibiotic as directed  2   Push fluids  3   If burning on urination, you may try over the counter Azo Urinary Pain Relief or comparable product  Please note that this type of product may cause your urine to become bright orange and it may stain your undergarments if you leak  Please wear protection as needed  4   Your urine will be sent for culture and sensitivity to try and make sure that the medicine prescribed is appropriate for your infection  Results take approximately 72 hours to return  If you have not heard from your [facility] provider by 4 days after your office visit, please call phone number at the top of your clinical summary for your results  5   If develop worsening symptoms with associated fever, back pain, nausea with vomiting, please proceed to emergency room for further evaluation  6   If you have recurrent urinary tract problems, please follow up with your PCP and / or urologist for further evaluation  Follow up with PCP in 3-5 days  Proceed to  ER if symptoms worsen      Chief Complaint     Chief Complaint   Patient presents with    Possible UTI     pain and burning upon urination         History of Present Illness       Patient is a 26-year-old female who presents to the office today complaining of dysuria  She states that she started yesterday with urinary urgency and burning with urination  She [place] and was [place] to be evaluated for her symptoms  She denies any fever, flank pain, blood in her urine, nausea, vomiting, abdominal pain, weakness, confusion  She states that she has a history of frequent UTIs but she is too old to do anything about it  Patient states that she has been on multiple antibiotics in the past including Macrobid which she had relief with this medication however she is unable to take Bactrim as she was told never to take this by her family doctor as it caused her to be hospitalized for hyponatremia  She states that she is currently not sexually active and has no concern for sexually transmitted infections  She denies any vaginal discharge  Review of Systems   Review of Systems   Constitutional: Negative for chills, fatigue and fever  Gastrointestinal: Negative for abdominal pain, nausea and vomiting  Endocrine: Negative for polydipsia, polyphagia and polyuria  Genitourinary: Positive for dysuria, frequency and urgency  Negative for decreased urine volume, difficulty urinating, hematuria, pelvic pain and vaginal discharge  Musculoskeletal: Negative for back pain  All other systems reviewed and are negative          Current Medications       Current Outpatient Medications:     aspirin 81 mg chewable tablet, Chew 81 mg daily, Disp: , Rfl:     co-enzyme Q-10 30 MG capsule, Take 30 mg by mouth 3 (three) times a day, Disp: , Rfl:     CRANBERRY PO, Take by mouth, Disp: , Rfl:     diazepam (VALIUM) 5 mg tablet, 5 mg every 6 (six) hours as needed , Disp: , Rfl:     docusate sodium (COLACE) 50 mg capsule, Take 100 mg by mouth 2 (two) times a day , Disp: , Rfl:     ELDERBERRY PO, Take by mouth, Disp: , Rfl:     GARLIC PO, Take by mouth, Disp: , Rfl:     lisinopril (ZESTRIL) 40 mg tablet, Take 40 mg by mouth daily , Disp: , Rfl:     omega-3-acid ethyl esters (LOVAZA) 1 g capsule, Take 2 g by mouth 2 (two) times a day, Disp: , Rfl:     saccharomyces boulardii (FLORASTOR) 250 mg capsule, Take 250 mg by mouth 2 (two) times a day, Disp: , Rfl:     simvastatin (ZOCOR) 10 mg tablet, 10 mg , Disp: , Rfl:     VITAMIN D, CHOLECALCIFEROL, PO, Take 2,000 Units by mouth  , Disp: , Rfl:     zinc gluconate 50 mg tablet, Take 50 mg by mouth daily  , Disp: , Rfl:     nitrofurantoin (MACROBID) 100 mg capsule, Take 1 capsule (100 mg total) by mouth 2 (two) times a day for 5 days, Disp: 10 capsule, Rfl: 0    Current Allergies     Allergies as of 02/03/2022 - Reviewed 02/03/2022   Allergen Reaction Noted    Bactrim [sulfamethoxazole-trimethoprim] Other (See Comments) 07/26/2020    Ibuprofen Other (See Comments) 10/03/2012            The following portions of the patient's history were reviewed and updated as appropriate: allergies, current medications, past family history, past medical history, past social history, past surgical history and problem list      Past Medical History:   Diagnosis Date    Arthritis     High cholesterol     Hypertension     Urinary tract infection        Past Surgical History:   Procedure Laterality Date    APPENDECTOMY      CHOLECYSTECTOMY      HIP SURGERY      HYSTERECTOMY      JOINT REPLACEMENT      KNEE SURGERY         Family History   Problem Relation Age of Onset    Cancer Mother     Stroke Father          Medications have been verified  Objective   /67   Pulse 72   Temp 98 2 °F (36 8 °C)   Resp 17   Ht 5' 2" (1 575 m)   Wt 70 3 kg (155 lb)   BMI 28 35 kg/m²        Physical Exam     Physical Exam  Vitals and nursing note reviewed  Constitutional:       General: She is not in acute distress  Appearance: Normal appearance  She is normal weight   She is not ill-appearing or toxic-appearing  Cardiovascular:      Rate and Rhythm: Normal rate and regular rhythm  Pulses: Normal pulses  Heart sounds: Normal heart sounds  No murmur heard  No friction rub  No gallop  Pulmonary:      Effort: Pulmonary effort is normal  No respiratory distress  Breath sounds: Normal breath sounds  No stridor  No wheezing, rhonchi or rales  Abdominal:      Palpations: Abdomen is soft  Tenderness: There is no abdominal tenderness  There is no guarding  Skin:     General: Skin is warm and dry  Neurological:      Mental Status: She is alert

## 2022-02-05 LAB
BACTERIA UR CULT: ABNORMAL
BACTERIA UR CULT: ABNORMAL

## 2022-03-03 ENCOUNTER — OFFICE VISIT (OUTPATIENT)
Dept: URGENT CARE | Facility: CLINIC | Age: 87
End: 2022-03-03
Payer: COMMERCIAL

## 2022-03-03 VITALS
DIASTOLIC BLOOD PRESSURE: 56 MMHG | BODY MASS INDEX: 28.52 KG/M2 | WEIGHT: 155 LBS | HEART RATE: 80 BPM | SYSTOLIC BLOOD PRESSURE: 132 MMHG | TEMPERATURE: 97.1 F | RESPIRATION RATE: 16 BRPM | OXYGEN SATURATION: 99 % | HEIGHT: 62 IN

## 2022-03-03 DIAGNOSIS — R31.9 URINARY TRACT INFECTION WITH HEMATURIA, SITE UNSPECIFIED: Primary | ICD-10-CM

## 2022-03-03 DIAGNOSIS — N39.0 URINARY TRACT INFECTION WITH HEMATURIA, SITE UNSPECIFIED: Primary | ICD-10-CM

## 2022-03-03 LAB
SL AMB  POCT GLUCOSE, UA: NEGATIVE
SL AMB LEUKOCYTE ESTERASE,UA: ABNORMAL
SL AMB POCT BILIRUBIN,UA: NEGATIVE
SL AMB POCT BLOOD,UA: ABNORMAL
SL AMB POCT CLARITY,UA: CLEAR
SL AMB POCT COLOR,UA: YELLOW
SL AMB POCT KETONES,UA: NEGATIVE
SL AMB POCT NITRITE,UA: NEGATIVE
SL AMB POCT PH,UA: 7
SL AMB POCT SPECIFIC GRAVITY,UA: 1.02
SL AMB POCT URINE PROTEIN: NEGATIVE
SL AMB POCT UROBILINOGEN: NEGATIVE

## 2022-03-03 PROCEDURE — 81002 URINALYSIS NONAUTO W/O SCOPE: CPT | Performed by: PHYSICIAN ASSISTANT

## 2022-03-03 PROCEDURE — 87086 URINE CULTURE/COLONY COUNT: CPT | Performed by: PHYSICIAN ASSISTANT

## 2022-03-03 PROCEDURE — 99213 OFFICE O/P EST LOW 20 MIN: CPT | Performed by: PHYSICIAN ASSISTANT

## 2022-03-03 RX ORDER — CEPHALEXIN 500 MG/1
500 CAPSULE ORAL EVERY 12 HOURS SCHEDULED
Qty: 20 CAPSULE | Refills: 0 | Status: SHIPPED | OUTPATIENT
Start: 2022-03-03 | End: 2022-03-13

## 2022-03-03 NOTE — PROGRESS NOTES
[facility]        NAME: Beverly Fu is a 80 y o  female  : 1931    MRN: [id number]  DATE: March 3, 2022  TIME: 8:23 AM    /56   Pulse 80   Temp (!) 97 1 °F (36 2 °C)   Resp 16   Ht 5' 2" (1 575 m)   Wt 70 3 kg (155 lb)   SpO2 99%   BMI 28 35 kg/m²     Assessment and Plan   Urinary tract infection with hematuria, site unspecified [N39 0, R31 9]  1  Urinary tract infection with hematuria, site unspecified  POCT urine dip    Urine culture    cephalexin (KEFLEX) 500 mg capsule         Patient Instructions       Follow up with PCP in 3-5 days  Proceed to  ER if symptoms worsen  Chief Complaint     Chief Complaint   Patient presents with    Possible UTI     pain and burning upon urination         History of Present Illness       Pt with urine frequency and burning for 2 days       Review of Systems   Review of Systems   Constitutional: Negative  HENT: Negative  Eyes: Negative  Respiratory: Negative  Cardiovascular: Negative  Gastrointestinal: Negative  Endocrine: Negative  Genitourinary: Positive for dysuria  Musculoskeletal: Negative  Skin: Negative  Allergic/Immunologic: Negative  Neurological: Negative  Hematological: Negative  Psychiatric/Behavioral: Negative  All other systems reviewed and are negative          Current Medications       Current Outpatient Medications:     aspirin 81 mg chewable tablet, Chew 81 mg daily, Disp: , Rfl:     co-enzyme Q-10 30 MG capsule, Take 30 mg by mouth 3 (three) times a day, Disp: , Rfl:     CRANBERRY PO, Take by mouth, Disp: , Rfl:     diazepam (VALIUM) 5 mg tablet, 5 mg every 6 (six) hours as needed , Disp: , Rfl:     docusate sodium (COLACE) 50 mg capsule, Take 100 mg by mouth 2 (two) times a day , Disp: , Rfl:     ELDERBERRY PO, Take by mouth, Disp: , Rfl:     GARLIC PO, Take by mouth, Disp: , Rfl:     lisinopril (ZESTRIL) 40 mg tablet, Take 40 mg by mouth daily , Disp: , Rfl:     omega-3-acid ethyl esters (LOVAZA) 1 g capsule, Take 2 g by mouth 2 (two) times a day, Disp: , Rfl:     saccharomyces boulardii (FLORASTOR) 250 mg capsule, Take 250 mg by mouth 2 (two) times a day, Disp: , Rfl:     simvastatin (ZOCOR) 10 mg tablet, 10 mg , Disp: , Rfl:     VITAMIN D, CHOLECALCIFEROL, PO, Take 2,000 Units by mouth  , Disp: , Rfl:     zinc gluconate 50 mg tablet, Take 50 mg by mouth daily  , Disp: , Rfl:     cephalexin (KEFLEX) 500 mg capsule, Take 1 capsule (500 mg total) by mouth every 12 (twelve) hours for 10 days, Disp: 20 capsule, Rfl: 0    Current Allergies     Allergies as of 03/03/2022 - Reviewed 03/03/2022   Allergen Reaction Noted    Bactrim [sulfamethoxazole-trimethoprim] Other (See Comments) 07/26/2020    Ibuprofen Other (See Comments) 10/03/2012            The following portions of the patient's history were reviewed and updated as appropriate: allergies, current medications, past family history, past medical history, past social history, past surgical history and problem list      Past Medical History:   Diagnosis Date    Arthritis     High cholesterol     Hypertension     Urinary tract infection        Past Surgical History:   Procedure Laterality Date    APPENDECTOMY      CHOLECYSTECTOMY      HIP SURGERY      HYSTERECTOMY      JOINT REPLACEMENT      KNEE SURGERY         Family History   Problem Relation Age of Onset    Cancer Mother     Stroke Father          Medications have been verified  Objective   /56   Pulse 80   Temp (!) 97 1 °F (36 2 °C)   Resp 16   Ht 5' 2" (1 575 m)   Wt 70 3 kg (155 lb)   SpO2 99%   BMI 28 35 kg/m²        Physical Exam     Physical Exam  Vitals and nursing note reviewed  Constitutional:       Appearance: Normal appearance  She is normal weight  HENT:      Head: Normocephalic and atraumatic        Right Ear: Tympanic membrane, ear canal and external ear normal       Left Ear: Tympanic membrane, ear canal and external ear normal  Nose: Nose normal       Mouth/Throat:      Mouth: Mucous membranes are moist       Pharynx: Oropharynx is clear  Eyes:      Extraocular Movements: Extraocular movements intact  Conjunctiva/sclera: Conjunctivae normal       Pupils: Pupils are equal, round, and reactive to light  Cardiovascular:      Rate and Rhythm: Normal rate and regular rhythm  Pulses: Normal pulses  Pulmonary:      Effort: Pulmonary effort is normal       Breath sounds: Normal breath sounds  Abdominal:      General: Abdomen is flat  Bowel sounds are normal       Palpations: Abdomen is soft  Musculoskeletal:         General: Normal range of motion  Cervical back: Normal range of motion and neck supple  Skin:     General: Skin is warm  Capillary Refill: Capillary refill takes less than 2 seconds  Neurological:      General: No focal deficit present  Mental Status: She is alert and oriented to person, place, and time     Psychiatric:         Mood and Affect: Mood normal          Behavior: Behavior normal

## 2022-03-03 NOTE — PATIENT INSTRUCTIONS
Urinary Tract Infection in Older Adults   WHAT YOU NEED TO KNOW:   A urinary tract infection (UTI) is caused by bacteria that get inside your urinary tract  Your urinary tract includes your kidneys, ureters, bladder, and urethra  Urine is made in your kidneys, and it flows from the ureters to the bladder  Urine leaves the bladder through the urethra  A UTI is more common in your lower urinary tract, which includes your bladder and urethra  DISCHARGE INSTRUCTIONS:   Return to the emergency department if:   · You are urinating very little or not at all  · You are vomiting  · You have a high fever with shaking chills  · You have side or back pain that gets worse  Call your doctor if:   · You have a fever  · You are a woman and you have increased white or yellow discharge from your vagina  · You do not feel better after 2 days of taking antibiotics  · You have questions or concerns about your condition or care  Medicines:   · Medicines  help treat the bacterial infection or decrease pain and burning when you urinate  You may also need medicines to decrease the urge to urinate often  If you have UTIs often (called recurrent UTIs), you may be given antibiotics to take regularly  You will be given directions for when and how to use antibiotics  The goal is to prevent UTIs but not cause antibiotic resistance by using antibiotics too often  · Take your medicine as directed  Contact your healthcare provider if you think your medicine is not helping or if you have side effects  Tell him or her if you are allergic to any medicine  Keep a list of the medicines, vitamins, and herbs you take  Include the amounts, and when and why you take them  Bring the list or the pill bottles to follow-up visits  Carry your medicine list with you in case of an emergency  Self-care:   · Drink liquids as directed  Liquids can help flush bacteria from your urinary tract   Ask how much liquid to drink each day and which liquids are best for you  You may need to drink more liquids than usual to help flush out the bacteria  Do not drink alcohol, caffeine, and citrus juices  These can irritate your bladder and increase your symptoms  · Apply heat  on your abdomen for 20 to 30 minutes every 2 hours for as many days as directed  Heat helps decrease discomfort and pressure in your bladder  Prevent a UTI:   · Urinate when you feel the urge  Do not hold your urine  Bacteria can grow if urine stays in the bladder too long  It may be helpful to urinate at least every 3 to 4 hours  · Urinate after you have sex  to flush away bacteria that can enter your urinary tract during sex  · Wear cotton underwear and clothes that are loose  Tight pants and nylon underwear can trap moisture and cause bacteria to grow  · Cranberry juice or cranberry supplements  may help prevent UTIs  Your healthcare provider can recommend the right juice or supplement for you  · Women should wipe front to back  after urinating or having a bowel movement  This may prevent germs from getting into the urinary tract  Do not douche or use feminine deodorants  These can change the chemical balance in your vagina  You may also be given vaginal estrogen medicine  This medicine helps prevent recurrent UTIs in women who have gone through menopause or are in shira-menopause  Follow up with your doctor as directed:  Write down your questions so you remember to ask them during your visits  © Copyright [employer] 2022 Information is for End User's use only and may not be sold, redistributed or otherwise used for commercial purposes  All illustrations and images included in CareNotes® are the copyrighted property of [employer]  or [facility]   The above information is an  only  It is not intended as medical advice for individual conditions or treatments   Talk to your doctor, nurse or pharmacist before following any medical regimen to see if it is safe and effective for you

## 2022-03-05 LAB — BACTERIA UR CULT: NORMAL

## 2022-03-19 ENCOUNTER — OFFICE VISIT (OUTPATIENT)
Dept: URGENT CARE | Facility: CLINIC | Age: 87
End: 2022-03-19
Payer: COMMERCIAL

## 2022-03-19 VITALS
WEIGHT: 155 LBS | SYSTOLIC BLOOD PRESSURE: 137 MMHG | BODY MASS INDEX: 28.52 KG/M2 | RESPIRATION RATE: 16 BRPM | OXYGEN SATURATION: 99 % | HEART RATE: 70 BPM | HEIGHT: 62 IN | DIASTOLIC BLOOD PRESSURE: 62 MMHG | TEMPERATURE: 98 F

## 2022-03-19 DIAGNOSIS — N30.01 ACUTE CYSTITIS WITH HEMATURIA: Primary | ICD-10-CM

## 2022-03-19 LAB
SL AMB  POCT GLUCOSE, UA: NEGATIVE
SL AMB LEUKOCYTE ESTERASE,UA: ABNORMAL
SL AMB POCT BILIRUBIN,UA: NEGATIVE
SL AMB POCT BLOOD,UA: ABNORMAL
SL AMB POCT CLARITY,UA: ABNORMAL
SL AMB POCT COLOR,UA: YELLOW
SL AMB POCT KETONES,UA: NEGATIVE
SL AMB POCT NITRITE,UA: POSITIVE
SL AMB POCT PH,UA: 6.5
SL AMB POCT SPECIFIC GRAVITY,UA: 1.02
SL AMB POCT URINE PROTEIN: NEGATIVE
SL AMB POCT UROBILINOGEN: NEGATIVE

## 2022-03-19 PROCEDURE — 81002 URINALYSIS NONAUTO W/O SCOPE: CPT | Performed by: PHYSICIAN ASSISTANT

## 2022-03-19 PROCEDURE — 87086 URINE CULTURE/COLONY COUNT: CPT | Performed by: PHYSICIAN ASSISTANT

## 2022-03-19 PROCEDURE — 87186 SC STD MICRODIL/AGAR DIL: CPT | Performed by: PHYSICIAN ASSISTANT

## 2022-03-19 PROCEDURE — 99213 OFFICE O/P EST LOW 20 MIN: CPT | Performed by: PHYSICIAN ASSISTANT

## 2022-03-19 PROCEDURE — 87077 CULTURE AEROBIC IDENTIFY: CPT | Performed by: PHYSICIAN ASSISTANT

## 2022-03-19 RX ORDER — CIPROFLOXACIN 500 MG/1
500 TABLET, FILM COATED ORAL EVERY 12 HOURS SCHEDULED
Qty: 14 TABLET | Refills: 0 | Status: SHIPPED | OUTPATIENT
Start: 2022-03-19 | End: 2022-03-26

## 2022-03-19 RX ORDER — HYDROCODONE BITARTRATE AND ACETAMINOPHEN 5; 325 MG/1; MG/1
TABLET ORAL
COMMUNITY
Start: 2022-03-09

## 2022-03-19 RX ORDER — MAGNESIUM OXIDE 400 MG/1
TABLET ORAL
COMMUNITY

## 2022-03-19 NOTE — PROGRESS NOTES
[facility]        NAME: Micky Jean-Baptiste is a 80 y o  female  : 1931    MRN: [id number]  DATE: 2022  TIME: 8:41 AM    Assessment and Plan   Acute cystitis with hematuria [N30 01]  1  Acute cystitis with hematuria  POCT urine dip    Urine culture    ciprofloxacin (CIPRO) 500 mg tablet    Ambulatory Referral to Urology     Patient with UTI symptoms less than 2 weeks ago  Urine culture from 2 weeks ago shows no infection  Patient has nitrates and urine this visit  Will treat for UTI  Had discussion with patient again regarding follow-up with urology due to recurrent symptoms  She appears to be more open for follow-up and was given referral again  Patient Instructions   Take antibiotic as prescribed  Complete full dose of antibiotics even if symptoms begin to improve or resolve  May use OTC Tylenol for fever  DRINK LOTS OF FLUIDS  Observe for signs of worsening infection including increased pain, discharge, blood in the urine, back or flank pain, fever or chills, or persistent symptoms  Your symptoms should begin to improve over the next couple days  Follow up with PCP in 3-5 days  Proceed to  ER if symptoms worsen  Chief Complaint     Chief Complaint   Patient presents with    Possible UTI     Pain and burning upon urination x 1 day         History of Present Illness       Patient is a 80-year-old female with significant past medical history of hypertension, hyperlipidemia, and recurrent UTIs presents the office complaining of dysuria with urinary urgency and frequency for 1 day  She admits to older but is unsure if it is from the urine or the vagina  She also admits to vaginal itching which has been chronic in nature for 1 year and treated at home with medications  Denies fevers, chills, hematuria, abdominal pain, back pain, vaginal discharge, or vaginal bleeding  Patient was seen in the office 2 weeks UTI and was treated with Keflex    Epic chart review shows urine culture no infection  Patient has given referral to Urology to rule out underlying cause of recurrent UTIs or other treatment but she has not followed up with them  Review of Systems   Review of Systems   Constitutional: Negative for chills and fever  Gastrointestinal: Negative for abdominal pain, diarrhea, nausea and vomiting  Genitourinary: Positive for dysuria, frequency and urgency  Negative for decreased urine volume, difficulty urinating, enuresis, flank pain, hematuria, pelvic pain, vaginal bleeding, vaginal discharge and vaginal pain  Skin: Negative for rash           Current Medications       Current Outpatient Medications:     aspirin 81 mg chewable tablet, Chew 81 mg daily, Disp: , Rfl:     co-enzyme Q-10 30 MG capsule, Take 30 mg by mouth 3 (three) times a day, Disp: , Rfl:     CRANBERRY PO, Take by mouth, Disp: , Rfl:     diazepam (VALIUM) 5 mg tablet, 5 mg every 6 (six) hours as needed , Disp: , Rfl:     docusate sodium (COLACE) 50 mg capsule, Take 100 mg by mouth 2 (two) times a day , Disp: , Rfl:     ELDERBERRY PO, Take by mouth, Disp: , Rfl:     GARLIC PO, Take by mouth, Disp: , Rfl:     lisinopril (ZESTRIL) 40 mg tablet, Take 40 mg by mouth daily , Disp: , Rfl:     magnesium oxide (MAG-OX) 400 mg tablet, Take by mouth, Disp: , Rfl:     omega-3-acid ethyl esters (LOVAZA) 1 g capsule, Take 2 g by mouth 2 (two) times a day, Disp: , Rfl:     saccharomyces boulardii (FLORASTOR) 250 mg capsule, Take 250 mg by mouth 2 (two) times a day, Disp: , Rfl:     simvastatin (ZOCOR) 10 mg tablet, 10 mg , Disp: , Rfl:     VITAMIN D, CHOLECALCIFEROL, PO, Take 2,000 Units by mouth  , Disp: , Rfl:     zinc gluconate 50 mg tablet, Take 50 mg by mouth daily  , Disp: , Rfl:     ciprofloxacin (CIPRO) 500 mg tablet, Take 1 tablet (500 mg total) by mouth every 12 (twelve) hours for 7 days, Disp: 14 tablet, Rfl: 0    HYDROcodone-acetaminophen (NORCO) 5-325 mg per tablet, , Disp: , Rfl:     Current Allergies     Allergies as of 03/19/2022 - Reviewed 03/19/2022   Allergen Reaction Noted    Bactrim [sulfamethoxazole-trimethoprim] Other (See Comments) 07/26/2020    Ibuprofen Other (See Comments) 10/03/2012            The following portions of the patient's history were reviewed and updated as appropriate: allergies, current medications, past family history, past medical history, past social history, past surgical history and problem list      Past Medical History:   Diagnosis Date    Arthritis     High cholesterol     Hypertension     Urinary tract infection        Past Surgical History:   Procedure Laterality Date    APPENDECTOMY      CHOLECYSTECTOMY      HIP SURGERY      HYSTERECTOMY      JOINT REPLACEMENT      KNEE SURGERY         Family History   Problem Relation Age of Onset    Cancer Mother     Stroke Father          Medications have been verified  Objective   /62   Pulse 70   Temp 98 °F (36 7 °C)   Resp 16   Ht 5' 2" (1 575 m)   Wt 70 3 kg (155 lb)   SpO2 99%   BMI 28 35 kg/m²   No LMP recorded  Patient has had a hysterectomy  Physical Exam     Physical Exam  Vitals and nursing note reviewed  Constitutional:       Appearance: Normal appearance  She is well-developed  HENT:      Head: Normocephalic and atraumatic  Right Ear: External ear normal       Left Ear: External ear normal       Nose: Nose normal    Eyes:      General: Lids are normal    Cardiovascular:      Rate and Rhythm: Normal rate and regular rhythm  Pulses: Normal pulses  Heart sounds: Normal heart sounds  No murmur heard  No friction rub  No gallop  Pulmonary:      Effort: Pulmonary effort is normal       Breath sounds: Normal breath sounds  No wheezing, rhonchi or rales  Abdominal:      General: Bowel sounds are normal       Palpations: Abdomen is soft  Tenderness: There is no abdominal tenderness  There is no right CVA tenderness or left CVA tenderness     Musculoskeletal: General: Normal range of motion  Lymphadenopathy:      Cervical: No cervical adenopathy  Skin:     General: Skin is warm and dry  Capillary Refill: Capillary refill takes less than 2 seconds  Neurological:      Mental Status: She is alert

## 2022-03-19 NOTE — PATIENT INSTRUCTIONS
Take antibiotic as prescribed  Complete full dose of antibiotics even if symptoms begin to improve or resolve  May use OTC Tylenol for fever  DRINK LOTS OF FLUIDS  Observe for signs of worsening infection including increased pain, discharge, blood in the urine, back or flank pain, fever or chills, or persistent symptoms  Your symptoms should begin to improve over the next couple days  Follow-up with your PCP in 3-5 days if symptoms worsen or do not improve  Go to ER if symptoms become severe  Urinary Tract Infection in Women   WHAT YOU NEED TO KNOW:   A urinary tract infection (UTI) is caused by bacteria that get inside your urinary tract  Most bacteria that enter your urinary tract come out when you urinate  If the bacteria stay in your urinary tract, you may get an infection  Your urinary tract includes your kidneys, ureters, bladder, and urethra  Urine is made in your kidneys, and it flows from the ureters to the bladder  Urine leaves the bladder through the urethra  A UTI is more common in your lower urinary tract, which includes your bladder and urethra  DISCHARGE INSTRUCTIONS:   Return to the emergency department if:   · You are urinating very little or not at all  · You have a high fever with shaking chills  · You have side or back pain that gets worse  Call your doctor if:   · You have a fever  · You do not feel better after 2 days of taking antibiotics  · You are vomiting  · You have questions or concerns about your condition or care  Medicines:   · Antibiotics  help fight a bacterial infection  If you have UTIs often (called recurrent UTIs), you may be given antibiotics to take regularly  You will be given directions for when and how to use antibiotics  The goal is to prevent UTIs but not cause antibiotic resistance by using antibiotics too often  · Medicines  may be given to decrease pain and burning when you urinate   They will also help decrease the feeling that you need to urinate often  These medicines will make your urine orange or red  · Take your medicine as directed  Contact your healthcare provider if you think your medicine is not helping or if you have side effects  Tell him or her if you are allergic to any medicine  Keep a list of the medicines, vitamins, and herbs you take  Include the amounts, and when and why you take them  Bring the list or the pill bottles to follow-up visits  Carry your medicine list with you in case of an emergency  Follow up with your doctor as directed:  Write down your questions so you remember to ask them during your visits  Prevent another UTI:   · Empty your bladder often  Urinate and empty your bladder as soon as you feel the need  Do not hold your urine for long periods of time  · Wipe from front to back after you urinate or have a bowel movement  This will help prevent germs from getting into your urinary tract through your urethra  · Drink liquids as directed  Ask how much liquid to drink each day and which liquids are best for you  You may need to drink more liquids than usual to help flush out the bacteria  Do not drink alcohol, caffeine, or citrus juices  These can irritate your bladder and increase your symptoms  Your healthcare provider may recommend cranberry juice to help prevent a UTI  · Urinate after you have sex  This can help flush out bacteria passed during sex  · Do not douche or use feminine deodorants  These can change the chemical balance in your vagina  · Change sanitary pads or tampons often  This will help prevent germs from getting into your urinary tract  · Talk to your healthcare provider about your birth control method  You may need to change your method if it is increasing your risk for UTIs  · Wear cotton underwear and clothes that are loose  Tight pants and nylon underwear can trap moisture and cause bacteria to grow  · Vaginal estrogen may be recommended  This medicine helps prevent UTIs in women who have gone through menopause or are in shira-menopause  · Do pelvic muscle exercises often  Pelvic muscle exercises may help you start and stop urinating  Strong pelvic muscles may help you empty your bladder easier  Squeeze these muscles tightly for 5 seconds like you are trying to hold back urine  Then relax for 5 seconds  Gradually work up to squeezing for 10 seconds  Do 3 sets of 15 repetitions a day, or as directed  © Copyright [employer] 2022 Information is for End User's use only and may not be sold, redistributed or otherwise used for commercial purposes  All illustrations and images included in CareNotes® are the copyrighted property of [employer] M , Inc  or [facility]   The above information is an  only  It is not intended as medical advice for individual conditions or treatments  Talk to your doctor, nurse or pharmacist before following any medical regimen to see if it is safe and effective for you

## 2022-03-21 ENCOUNTER — TELEPHONE (OUTPATIENT)
Dept: OTHER | Facility: OTHER | Age: 87
End: 2022-03-21

## 2022-03-22 LAB — BACTERIA UR CULT: ABNORMAL

## 2022-04-26 RX ORDER — ZINC GLUCONATE 50 MG
TABLET ORAL
COMMUNITY

## 2022-04-29 ENCOUNTER — OFFICE VISIT (OUTPATIENT)
Dept: UROLOGY | Facility: CLINIC | Age: 87
End: 2022-04-29
Payer: COMMERCIAL

## 2022-04-29 VITALS
BODY MASS INDEX: 29.26 KG/M2 | OXYGEN SATURATION: 97 % | SYSTOLIC BLOOD PRESSURE: 140 MMHG | WEIGHT: 159 LBS | HEART RATE: 88 BPM | DIASTOLIC BLOOD PRESSURE: 78 MMHG | HEIGHT: 62 IN

## 2022-04-29 DIAGNOSIS — N39.0 RECURRENT UTI: Primary | ICD-10-CM

## 2022-04-29 DIAGNOSIS — N30.01 ACUTE CYSTITIS WITH HEMATURIA: ICD-10-CM

## 2022-04-29 DIAGNOSIS — N39.46 MIXED STRESS AND URGE URINARY INCONTINENCE: ICD-10-CM

## 2022-04-29 LAB
SL AMB  POCT GLUCOSE, UA: NORMAL
SL AMB LEUKOCYTE ESTERASE,UA: NORMAL
SL AMB POCT BILIRUBIN,UA: NORMAL
SL AMB POCT BLOOD,UA: NORMAL
SL AMB POCT CLARITY,UA: CLEAR
SL AMB POCT COLOR,UA: YELLOW
SL AMB POCT KETONES,UA: NORMAL
SL AMB POCT NITRITE,UA: NORMAL
SL AMB POCT PH,UA: 7
SL AMB POCT SPECIFIC GRAVITY,UA: 1.02
SL AMB POCT URINE PROTEIN: NORMAL
SL AMB POCT UROBILINOGEN: 0.2

## 2022-04-29 PROCEDURE — 81002 URINALYSIS NONAUTO W/O SCOPE: CPT

## 2022-04-29 PROCEDURE — 99204 OFFICE O/P NEW MOD 45 MIN: CPT

## 2022-04-29 RX ORDER — ESTRADIOL 0.1 MG/G
1 CREAM VAGINAL 2 TIMES WEEKLY
Qty: 42.5 G | Refills: 1 | Status: SHIPPED | OUTPATIENT
Start: 2022-05-02

## 2022-04-29 NOTE — PROGRESS NOTES
4/29/2022    No chief complaint on file  Assessment and Plan    80 y o  female new patient to office    1  Recurrent UTI  · History of recurrent UTIs going back to when she was a teenager  · Last positive urine culture on 03/19  · > 100,000 cfu/ml Enterobacter cloacae, she was treated with Ciprofloxacin which was susceptible  · Urine dip in office was negative for leukocytes, nitrates, or blood  · Currently she takes a daily probiotic and cranberry pill  · I reviewed proper hygiene  · I instructed her to continue with adequate fluid hydration  · I recommend starting vaginal Estrace 2 times per week  · Follow-up in 6 months to re-evaluate    2  Mixed urinary incontinence  · She reports urge and stress incontinence ongoing for several years  · She states that she may have had bladder sling surgery in the past but is unsure  · She uses between 1-2 incontinence pad per day  · I offered referral to pelvic floor physical therapy but she declined    History of Present Illness  Zee Weinberg is a 80 y o  female here for evaluation of recurrent UTIs  She has had multiple visits to urgent care for complaints of UTI symptoms  She was last seen on 03/19 and it was recommended again that she follow with Urology to be further evaluated  She had a positive urine culture on 3/19 that showed > 100,000 cfu/ml Enterobacter cloacae, she was treated with Ciprofloxacin which was susceptible  She presents today reporting no complaints  She reports history of recurrent UTIs and has been ongoing problem since being a teenager  Never required hospitalization  Her typical UTI symptoms are dysuria and frequency  She takes probiotics and cranberry supplement daily  She does report episodes at times of incomplete bladder emptying for which she has to stand up and then she can completely empty her bladder  Experiences stress and urge incontinence, may use 1-2 incontinence pads per day   She reports 2 prior surgeries for urinary incontinence but is unsure what they were, "may have been a sling many years ago"  She reports she performs proper hygiene by cleaning from the back  She drinks about 2 L of water per day  Urine dip in office was negative for leukocytes, nitrates, or blood  Review of Systems   Constitutional: Negative for chills and fever  HENT: Negative for ear pain and sore throat  Eyes: Negative for pain and visual disturbance  Respiratory: Negative for cough and shortness of breath  Cardiovascular: Negative for chest pain and palpitations  Gastrointestinal: Negative for abdominal pain, diarrhea, nausea and vomiting  Genitourinary: Negative for dysuria, flank pain, frequency, hematuria, pelvic pain, urgency and vaginal pain  Musculoskeletal: Negative for arthralgias and back pain  Skin: Negative for color change and rash  Neurological: Negative for seizures and syncope  All other systems reviewed and are negative  Vitals  There were no vitals filed for this visit  Physical Exam  Constitutional:       General: She is not in acute distress  Appearance: Normal appearance  She is normal weight  She is not ill-appearing or toxic-appearing  HENT:      Head: Normocephalic and atraumatic  Nose: Nose normal    Eyes:      General: No scleral icterus  Conjunctiva/sclera: Conjunctivae normal    Cardiovascular:      Rate and Rhythm: Normal rate  Pulmonary:      Effort: Pulmonary effort is normal  No respiratory distress  Abdominal:      General: Abdomen is flat  Palpations: Abdomen is soft  Tenderness: There is no abdominal tenderness  There is no right CVA tenderness or left CVA tenderness  Musculoskeletal:         General: Normal range of motion  Cervical back: Normal range of motion  Skin:     General: Skin is warm and dry  Neurological:      General: No focal deficit present  Mental Status: She is alert and oriented to person, place, and time   Mental status is at baseline  Psychiatric:         Mood and Affect: Mood normal          Behavior: Behavior normal          Thought Content: Thought content normal          Judgment: Judgment normal          Past History  Past Medical History:   Diagnosis Date    Arthritis     High cholesterol     Hypertension     Urinary tract infection      Social History     Socioeconomic History    Marital status:      Spouse name: Not on file    Number of children: Not on file    Years of education: Not on file    Highest education level: Not on file   Occupational History    Not on file   Tobacco Use    Smoking status: Never Smoker    Smokeless tobacco: Never Used   Vaping Use    Vaping Use: Never used   Substance and Sexual Activity    Alcohol use: Not Currently    Drug use: Not Currently    Sexual activity: Not on file   Other Topics Concern    Not on file   Social History Narrative    Not on file     Social Determinants of Health     Financial Resource Strain: Not on file   Food Insecurity: Not on file   Transportation Needs: Not on file   Physical Activity: Not on file   Stress: Not on file   Social Connections: Not on file   Intimate Partner Violence: Not on file   Housing Stability: Not on file     Social History     Tobacco Use   Smoking Status Never Smoker   Smokeless Tobacco Never Used     Family History   Problem Relation Age of Onset    Cancer Mother     Stroke Father        The following portions of the patient's history were reviewed and updated as appropriate allergies, current medications, past medical history, past social history, past surgical history and problem list    Imaging:    Results  No results found for this or any previous visit (from the past 1 hour(s))  ]  No results found for: PSA  Lab Results   Component Value Date    CALCIUM 9 2 07/10/2020    K 4 9 07/10/2020    CO2 23 07/10/2020    CL 89 (L) 07/10/2020    BUN 17 07/10/2020    CREATININE 1 43 (H) 07/10/2020     Lab Results Component Value Date    WBC 8 43 07/10/2020    HGB 13 7 07/10/2020    HCT 40 1 07/10/2020    MCV 95 07/10/2020     07/10/2020       DIONICIO Chi

## 2022-05-03 ENCOUNTER — TELEPHONE (OUTPATIENT)
Dept: UROLOGY | Facility: CLINIC | Age: 87
End: 2022-05-03

## 2022-05-03 NOTE — TELEPHONE ENCOUNTER
I agree with discontinuing use  She should ensure to clean the area thoroughly  No additional orders at this time

## 2022-05-03 NOTE — TELEPHONE ENCOUNTER
Pt called c/o using Estrace and within 10-15 minutes, started with "horrific burning"  Advised pt to d/c med  Provider to be notified and for any other tx orders

## 2022-05-08 ENCOUNTER — OFFICE VISIT (OUTPATIENT)
Dept: URGENT CARE | Facility: CLINIC | Age: 87
End: 2022-05-08
Payer: COMMERCIAL

## 2022-05-08 VITALS
DIASTOLIC BLOOD PRESSURE: 73 MMHG | RESPIRATION RATE: 18 BRPM | HEIGHT: 62 IN | TEMPERATURE: 97.8 F | OXYGEN SATURATION: 95 % | HEART RATE: 82 BPM | BODY MASS INDEX: 28.52 KG/M2 | WEIGHT: 155 LBS | SYSTOLIC BLOOD PRESSURE: 183 MMHG

## 2022-05-08 DIAGNOSIS — N39.0 URINARY TRACT INFECTION WITHOUT HEMATURIA, SITE UNSPECIFIED: Primary | ICD-10-CM

## 2022-05-08 LAB
SL AMB  POCT GLUCOSE, UA: ABNORMAL
SL AMB LEUKOCYTE ESTERASE,UA: ABNORMAL
SL AMB POCT BILIRUBIN,UA: ABNORMAL
SL AMB POCT BLOOD,UA: ABNORMAL
SL AMB POCT CLARITY,UA: ABNORMAL
SL AMB POCT COLOR,UA: YELLOW
SL AMB POCT KETONES,UA: ABNORMAL
SL AMB POCT NITRITE,UA: ABNORMAL
SL AMB POCT PH,UA: 7
SL AMB POCT SPECIFIC GRAVITY,UA: 1.01
SL AMB POCT URINE PROTEIN: ABNORMAL
SL AMB POCT UROBILINOGEN: NORMAL

## 2022-05-08 PROCEDURE — 81002 URINALYSIS NONAUTO W/O SCOPE: CPT | Performed by: EMERGENCY MEDICINE

## 2022-05-08 PROCEDURE — 87147 CULTURE TYPE IMMUNOLOGIC: CPT | Performed by: EMERGENCY MEDICINE

## 2022-05-08 PROCEDURE — 87086 URINE CULTURE/COLONY COUNT: CPT | Performed by: EMERGENCY MEDICINE

## 2022-05-08 PROCEDURE — 99213 OFFICE O/P EST LOW 20 MIN: CPT | Performed by: EMERGENCY MEDICINE

## 2022-05-08 RX ORDER — PHENAZOPYRIDINE HYDROCHLORIDE 100 MG/1
100 TABLET, FILM COATED ORAL 3 TIMES DAILY PRN
Qty: 10 TABLET | Refills: 0 | Status: SHIPPED | OUTPATIENT
Start: 2022-05-08 | End: 2022-05-08 | Stop reason: CLARIF

## 2022-05-08 RX ORDER — CEPHALEXIN 500 MG/1
500 CAPSULE ORAL EVERY 12 HOURS SCHEDULED
Qty: 14 CAPSULE | Refills: 0 | Status: SHIPPED | OUTPATIENT
Start: 2022-05-08 | End: 2022-05-15

## 2022-05-08 NOTE — PROGRESS NOTES
[facility]        NAME: Aniyah Quach is a 80 y o  female  : 1931    MRN: [id number]  DATE: May 8, 2022  TIME: 8:14 AM    Assessment and Plan   Urinary tract infection without hematuria, site unspecified [N39 0]  1  Urinary tract infection without hematuria, site unspecified  POCT urine dip    Urine culture         Patient Instructions     There are no Patient Instructions on file for this visit  Follow up with PCP in 3-5 days  Proceed to  ER if symptoms worsen  Chief Complaint     Chief Complaint   Patient presents with    Urinary Frequency     And Burning on urination  Started          History of Present Illness       Patient complains of burning on urination with increased frequency for the past day  Review of Systems   Review of Systems   Constitutional: Negative for chills and fever  Genitourinary: Positive for dysuria and frequency  Negative for difficulty urinating, flank pain, hematuria and urgency  Musculoskeletal: Negative for back pain           Current Medications       Current Outpatient Medications:     aspirin 81 mg chewable tablet, Chew 81 mg daily, Disp: , Rfl:     co-enzyme Q-10 30 MG capsule, Take 30 mg by mouth 3 (three) times a day, Disp: , Rfl:     CRANBERRY PO, Take by mouth, Disp: , Rfl:     diazepam (VALIUM) 5 mg tablet, 5 mg every 6 (six) hours as needed , Disp: , Rfl:     Diclofenac Sodium (VOLTAREN) 1 %, , Disp: , Rfl:     docusate sodium (COLACE) 50 mg capsule, Take 100 mg by mouth 2 (two) times a day , Disp: , Rfl:     ELDERBERRY PO, Take by mouth, Disp: , Rfl:     estradiol (ESTRACE VAGINAL) 0 1 mg/g vaginal cream, Insert 1 g into the vagina 2 (two) times a week Apply pea size amount to external vagina 2 times per week, Disp: 42 5 g, Rfl: 1    GARLIC PO, Take by mouth, Disp: , Rfl:     HYDROcodone-acetaminophen (NORCO) 5-325 mg per tablet, , Disp: , Rfl:     lisinopril (ZESTRIL) 40 mg tablet, Take 40 mg by mouth daily , Disp: , Rfl:    magnesium oxide (MAG-OX) 400 mg tablet, Take by mouth, Disp: , Rfl:     omega-3-acid ethyl esters (LOVAZA) 1 g capsule, Take 2 g by mouth 2 (two) times a day, Disp: , Rfl:     saccharomyces boulardii (FLORASTOR) 250 mg capsule, Take 250 mg by mouth 2 (two) times a day, Disp: , Rfl:     simvastatin (ZOCOR) 10 mg tablet, 10 mg , Disp: , Rfl:     VITAMIN D, CHOLECALCIFEROL, PO, Take 2,000 Units by mouth   (Patient not taking: Reported on 4/29/2022 ), Disp: , Rfl:     Zinc 50 MG TABS, Take by mouth (Patient not taking: Reported on 4/29/2022 ), Disp: , Rfl:     zinc gluconate 50 mg tablet, Take 50 mg by mouth daily  , Disp: , Rfl:     Current Allergies     Allergies as of 05/08/2022 - Reviewed 04/29/2022   Allergen Reaction Noted    Sulfamethoxazole-trimethoprim Other (See Comments) 07/26/2020    Ibuprofen Other (See Comments) 10/03/2012            The following portions of the patient's history were reviewed and updated as appropriate: allergies, current medications, past family history, past medical history, past social history, past surgical history and problem list      Past Medical History:   Diagnosis Date    Arthritis     High cholesterol     Hypertension     Urinary tract infection        Past Surgical History:   Procedure Laterality Date    APPENDECTOMY      CHOLECYSTECTOMY      HIP SURGERY      HYSTERECTOMY      JOINT REPLACEMENT      KNEE SURGERY         Family History   Problem Relation Age of Onset    Cancer Mother     Stroke Father          Medications have been verified  Objective   BP (!) 183/73   Pulse 82   Temp 97 8 °F (36 6 °C)   Resp 18   Ht 5' 2" (1 575 m)   Wt 70 3 kg (155 lb)   SpO2 95%   BMI 28 35 kg/m²        Physical Exam     Physical Exam  Vitals and nursing note reviewed  Constitutional:       General: She is not in acute distress  Appearance: She is well-developed  Abdominal:      General: Bowel sounds are normal  There is no distension  Palpations: Abdomen is soft  There is no mass  Tenderness: There is no abdominal tenderness  There is no guarding or rebound  Skin:     General: Skin is warm and dry  Findings: No rash  Neurological:      Mental Status: She is alert and oriented to person, place, and time  Psychiatric:         Mood and Affect: Mood normal          Behavior: Behavior normal          Thought Content:  Thought content normal          Judgment: Judgment normal

## 2022-05-08 NOTE — PATIENT INSTRUCTIONS
Urinary Tract Infection in Women, Ambulatory Care   GENERAL INFORMATION:   A urinary tract infection (UTI)  is caused by bacteria that get inside your urinary tract  Most bacteria that enter your urinary tract are expelled when you urinate  If the bacteria stay in your urinary tract, you may get an infection  Your urinary tract includes your kidneys, ureters, bladder, and urethra  Urine is made in your kidneys, and it flows from the ureters to the bladder  Urine leaves the bladder through the urethra  A UTI is more common in your lower urinary tract, which includes your bladder and urethra  Common symptoms include the following:   · Urinating more often or waking from sleep to urinate    · Pain or burning when you urinate    · Pain or pressure in your lower abdomen     · Urine that smells bad    · Blood in your urine    · Leaking urine  Seek immediate care for the following symptoms:   · Urinating very little or not at all    · Vomiting    · Shaking chills with a fever    · Side or back pain that gets worse  Treatment for a UTI  may include medicines to treat a bacterial infection  You may also need medicines to decrease pain and burning, or decrease the urge to urinate often  Prevent a UTI:   · Urinate when you feel the urge  Do not hold your urine  Urinate as soon as you feel you have to  · Drink liquids as directed  Ask how much liquid to drink each day and which liquids are best for you  You may need to drink more fluids than usual to help flush out the bacteria  Do not drink alcohol, caffeine, and citrus juices  These can irritate your bladder and increase your symptoms  · Apply heat  on your abdomen for 20 to 30 minutes every 2 hours for as many days as directed  Heat helps decrease discomfort and pressure in your bladder  Follow up with your healthcare provider as directed:  Write down your questions so you remember to ask them during your visits     CARE AGREEMENT:   You have the right to help plan your care  Learn about your health condition and how it may be treated  Discuss treatment options with your caregivers to decide what care you want to receive  You always have the right to refuse treatment  The above information is an  only  It is not intended as medical advice for individual conditions or treatments  Talk to your doctor, nurse or pharmacist before following any medical regimen to see if it is safe and effective for you  © 2014 [address] is for End User's use only and may not be sold, redistributed or otherwise used for commercial purposes  All illustrations and images included in CareNotes® are the copyrighted property of [employer]  or Frankie Ramírez

## 2022-05-09 LAB — BACTERIA UR CULT: ABNORMAL

## 2022-05-19 ENCOUNTER — OFFICE VISIT (OUTPATIENT)
Dept: URGENT CARE | Facility: CLINIC | Age: 87
End: 2022-05-19
Payer: COMMERCIAL

## 2022-05-19 VITALS
WEIGHT: 155 LBS | OXYGEN SATURATION: 95 % | TEMPERATURE: 97 F | HEART RATE: 81 BPM | BODY MASS INDEX: 28.52 KG/M2 | HEIGHT: 62 IN | DIASTOLIC BLOOD PRESSURE: 59 MMHG | RESPIRATION RATE: 18 BRPM | SYSTOLIC BLOOD PRESSURE: 123 MMHG

## 2022-05-19 DIAGNOSIS — N30.00 ACUTE CYSTITIS WITHOUT HEMATURIA: Primary | ICD-10-CM

## 2022-05-19 LAB
SL AMB  POCT GLUCOSE, UA: NEGATIVE
SL AMB LEUKOCYTE ESTERASE,UA: ABNORMAL
SL AMB POCT BILIRUBIN,UA: NEGATIVE
SL AMB POCT BLOOD,UA: NEGATIVE
SL AMB POCT CLARITY,UA: ABNORMAL
SL AMB POCT COLOR,UA: YELLOW
SL AMB POCT KETONES,UA: NEGATIVE
SL AMB POCT NITRITE,UA: NEGATIVE
SL AMB POCT PH,UA: 6
SL AMB POCT SPECIFIC GRAVITY,UA: 1
SL AMB POCT URINE PROTEIN: ABNORMAL
SL AMB POCT UROBILINOGEN: NEGATIVE

## 2022-05-19 PROCEDURE — 81002 URINALYSIS NONAUTO W/O SCOPE: CPT

## 2022-05-19 PROCEDURE — 87147 CULTURE TYPE IMMUNOLOGIC: CPT

## 2022-05-19 PROCEDURE — 99213 OFFICE O/P EST LOW 20 MIN: CPT

## 2022-05-19 PROCEDURE — 87086 URINE CULTURE/COLONY COUNT: CPT

## 2022-05-19 RX ORDER — CIPROFLOXACIN 500 MG/1
500 TABLET, FILM COATED ORAL EVERY 12 HOURS SCHEDULED
Qty: 14 TABLET | Refills: 0 | Status: SHIPPED | OUTPATIENT
Start: 2022-05-19 | End: 2022-05-26

## 2022-05-19 NOTE — PROGRESS NOTES
[facility]        NAME: Dinesh De La Paz is a 80 y o  female  : 1931    MRN: [id number]  DATE: May 19, 2022  TIME: 9:20 AM    Assessment and Plan   Acute cystitis without hematuria [N30 00]  1  Acute cystitis without hematuria  POCT urine dip    Urine culture    ciprofloxacin (CIPRO) 500 mg tablet     Urine dip and symptomology consistent with urinary tract infection, due to recurrence she is being treated with ciprofloxacin 500 milligrams taken 2 times a day for 7 days  She was told to follow-up with urology in consider urogynecology as another option, however the patient was not interested in pessaries or procedures therefore a referral was not given  Patient Instructions     - Take 1 tablet of ciprofloxacin 500 mg 2 times a day for the next 7 days  - You have been given an antibiotic, which a common side effect is diarrhea  Eat yogurt, fresh fruits and veggies, increase daily fiber intake, take probiotics, or try kombucha    -this medication but she is at increased risk for tendon rupture, so no heavy lifting or strenuous activity for the next 7-10 days  Follow up with PCP in 3-5 days  Proceed to  ER if symptoms worsen  Chief Complaint     Chief Complaint   Patient presents with    Possible UTI     C/o burning and frequency of urination which started last pm Hx of frequent UTI  History of Present Illness     Dinesh De La Paz is a 80 y o  female who has history significant for hypercholesterolemia, hypertension, and frequent urinary tract infections who presents today with complaint of dysuria, frequency, and urgency that started last night  Patient states that she follows with Urology, but they were not doing anything for her  She reports mild pelvic pressure  She denies flank pain  She denies fevers, chills, night sweats, nausea, vomiting  She denies sexual intercourse or chance of STIs      Review of Systems   Review of Systems   Constitutional: Negative for chills, fatigue and fever  Respiratory: Negative for shortness of breath and wheezing  Cardiovascular: Negative for chest pain and palpitations  Gastrointestinal: Positive for abdominal pain  Negative for abdominal distention, constipation, diarrhea, nausea and vomiting  Genitourinary: Positive for dysuria, frequency and urgency  Negative for difficulty urinating, flank pain and hematuria  Musculoskeletal: Negative for back pain           Current Medications       Current Outpatient Medications:     aspirin 81 mg chewable tablet, Chew 81 mg daily, Disp: , Rfl:     ciprofloxacin (CIPRO) 500 mg tablet, Take 1 tablet (500 mg total) by mouth every 12 (twelve) hours for 7 days, Disp: 14 tablet, Rfl: 0    co-enzyme Q-10 30 MG capsule, Take 30 mg by mouth 3 (three) times a day, Disp: , Rfl:     CRANBERRY PO, Take by mouth, Disp: , Rfl:     diazepam (VALIUM) 5 mg tablet, 5 mg every 6 (six) hours as needed , Disp: , Rfl:     docusate sodium (COLACE) 50 mg capsule, Take 100 mg by mouth 2 (two) times a day , Disp: , Rfl:     ELDERBERRY PO, Take by mouth, Disp: , Rfl:     GARLIC PO, Take by mouth, Disp: , Rfl:     magnesium oxide (MAG-OX) 400 mg tablet, Take by mouth, Disp: , Rfl:     omega-3-acid ethyl esters (LOVAZA) 1 g capsule, Take 2 g by mouth 2 (two) times a day, Disp: , Rfl:     saccharomyces boulardii (FLORASTOR) 250 mg capsule, Take 250 mg by mouth 2 (two) times a day, Disp: , Rfl:     simvastatin (ZOCOR) 10 mg tablet, 10 mg , Disp: , Rfl:     VITAMIN D, CHOLECALCIFEROL, PO, Take 2,000 Units by mouth  , Disp: , Rfl:     Diclofenac Sodium (VOLTAREN) 1 %, , Disp: , Rfl:     estradiol (ESTRACE VAGINAL) 0 1 mg/g vaginal cream, Insert 1 g into the vagina 2 (two) times a week Apply pea size amount to external vagina 2 times per week (Patient not taking: Reported on 5/19/2022), Disp: 42 5 g, Rfl: 1    HYDROcodone-acetaminophen (NORCO) 5-325 mg per tablet, , Disp: , Rfl:     lisinopril (ZESTRIL) 40 mg tablet, Take 40 mg by mouth daily , Disp: , Rfl:     Zinc 50 MG TABS, Take by mouth   (Patient not taking: Reported on 5/19/2022), Disp: , Rfl:     zinc gluconate 50 mg tablet, Take 50 mg by mouth daily   (Patient not taking: Reported on 5/19/2022), Disp: , Rfl:     Current Allergies     Allergies as of 05/19/2022 - Reviewed 05/19/2022   Allergen Reaction Noted    Estradiol Other (See Comments) 05/19/2022    Sulfamethoxazole-trimethoprim Other (See Comments) 07/26/2020    Ibuprofen Other (See Comments) 10/03/2012            The following portions of the patient's history were reviewed and updated as appropriate: allergies, current medications, past family history, past medical history, past social history, past surgical history and problem list      Past Medical History:   Diagnosis Date    Arthritis     High cholesterol     Hypertension     Urinary tract infection        Past Surgical History:   Procedure Laterality Date    APPENDECTOMY      CHOLECYSTECTOMY      HIP SURGERY      HYSTERECTOMY      JOINT REPLACEMENT      KNEE SURGERY         Family History   Problem Relation Age of Onset    Cancer Mother     Stroke Father          Medications have been verified  Objective   /59   Pulse 81   Temp (!) 97 °F (36 1 °C)   Resp 18   Ht 5' 2" (1 575 m)   Wt 70 3 kg (155 lb)   SpO2 95%   BMI 28 35 kg/m²   No LMP recorded  Patient has had a hysterectomy  Physical Exam     Physical Exam  Vitals and nursing note reviewed  Constitutional:       General: She is not in acute distress  Appearance: She is not ill-appearing  HENT:      Head: Normocephalic and atraumatic  Cardiovascular:      Rate and Rhythm: Normal rate and regular rhythm  Pulses: Normal pulses  Heart sounds: Normal heart sounds  No murmur heard  No friction rub  No gallop  Pulmonary:      Effort: Pulmonary effort is normal       Breath sounds: Normal breath sounds  No stridor   No wheezing, rhonchi or rales  Abdominal:      General: Bowel sounds are normal  There is no distension  Palpations: Abdomen is soft  Tenderness: There is abdominal tenderness in the suprapubic area  There is no right CVA tenderness, left CVA tenderness or guarding  Neurological:      Mental Status: She is alert  Urine dip revealed moderate leukocytes, treating

## 2022-05-19 NOTE — PATIENT INSTRUCTIONS
Urinary Tract Infection in Women   - Take 1 tablet of ciprofloxacin 500 mg 2 times a day for the next 7 days  - You have been given an antibiotic, which a common side effect is diarrhea  Eat yogurt, fresh fruits and veggies, increase daily fiber intake, take probiotics, or try kombucha    -this medication but she is at increased risk for tendon rupture, so no heavy lifting or strenuous activity for the next 7-10 days  AMBULATORY CARE:   A urinary tract infection (UTI)  is caused by bacteria that get inside your urinary tract  Most bacteria that enter your urinary tract come out when you urinate  If the bacteria stay in your urinary tract, you may get an infection  Your urinary tract includes your kidneys, ureters, bladder, and urethra  Urine is made in your kidneys, and it flows from the ureters to the bladder  Urine leaves the bladder through the urethra  A UTI is more common in your lower urinary tract, which includes your bladder and urethra  Common symptoms include the following:   Urinating more often or waking from sleep to urinate    Pain or burning when you urinate    Pain or pressure in your lower abdomen     Urine that smells bad    Blood in your urine    Leaking urine    Seek care immediately if:   You are urinating very little or not at all  You have a high fever with shaking chills  You have side or back pain that gets worse  Call your doctor if:   You have a fever  You do not feel better after 2 days of taking antibiotics  You are vomiting  You have questions or concerns about your condition or care  Treatment for a UTI  may include antibiotics to treat a bacterial infection  You may also need medicines to decrease pain and burning, or decrease the urge to urinate often  If you have UTIs often (called recurrent UTIs), you may be given antibiotics to take regularly  You will be given directions for when and how to use antibiotics   The goal is to prevent UTIs but not cause antibiotic resistance by using antibiotics too often  Prevent a UTI:   Empty your bladder often  Urinate and empty your bladder as soon as you feel the need  Do not hold your urine for long periods of time  Wipe from front to back after you urinate or have a bowel movement  This will help prevent germs from getting into your urinary tract through your urethra  Drink liquids as directed  Ask how much liquid to drink each day and which liquids are best for you  You may need to drink more liquids than usual to help flush out the bacteria  Do not drink alcohol, caffeine, or citrus juices  These can irritate your bladder and increase your symptoms  Your healthcare provider may recommend cranberry juice to help prevent a UTI  Urinate after you have sex  This can help flush out bacteria passed during sex  Do not douche or use feminine deodorants  These can change the chemical balance in your vagina  Change sanitary pads or tampons often  This will help prevent germs from getting into your urinary tract  Talk to your healthcare provider about your birth control method  You may need to change your method if it is increasing your risk for UTIs  Wear cotton underwear and clothes that are loose  Tight pants and nylon underwear can trap moisture and cause bacteria to grow  Vaginal estrogen may be recommended  This medicine helps prevent UTIs in women who have gone through menopause or are in shira-menopause  Do pelvic muscle exercises often  Pelvic muscle exercises may help you start and stop urinating  Strong pelvic muscles may help you empty your bladder easier  Squeeze these muscles tightly for 5 seconds like you are trying to hold back urine  Then relax for 5 seconds  Gradually work up to squeezing for 10 seconds  Do 3 sets of 15 repetitions a day, or as directed  Follow up with your doctor as directed:  Write down your questions so you remember to ask them during your visits     © Copyright [employer] 2022 Information is for End User's use only and may not be sold, redistributed or otherwise used for commercial purposes  All illustrations and images included in CareNotes® are the copyrighted property of [employer] Inc  or Poonam King  The above information is an  only  It is not intended as medical advice for individual conditions or treatments  Talk to your doctor, nurse or pharmacist before following any medical regimen to see if it is safe and effective for you

## 2022-05-20 LAB — BACTERIA UR CULT: ABNORMAL

## 2022-05-23 ENCOUNTER — TELEPHONE (OUTPATIENT)
Dept: CARDIOLOGY CLINIC | Facility: CLINIC | Age: 87
End: 2022-05-23

## 2022-08-02 ENCOUNTER — OFFICE VISIT (OUTPATIENT)
Dept: URGENT CARE | Facility: CLINIC | Age: 87
End: 2022-08-02
Payer: COMMERCIAL

## 2022-08-02 VITALS
WEIGHT: 155 LBS | OXYGEN SATURATION: 96 % | DIASTOLIC BLOOD PRESSURE: 65 MMHG | RESPIRATION RATE: 18 BRPM | SYSTOLIC BLOOD PRESSURE: 146 MMHG | BODY MASS INDEX: 28.52 KG/M2 | HEIGHT: 62 IN | HEART RATE: 81 BPM | TEMPERATURE: 97 F

## 2022-08-02 DIAGNOSIS — N30.00 ACUTE CYSTITIS WITHOUT HEMATURIA: Primary | ICD-10-CM

## 2022-08-02 LAB
SL AMB  POCT GLUCOSE, UA: NEGATIVE
SL AMB LEUKOCYTE ESTERASE,UA: ABNORMAL
SL AMB POCT BILIRUBIN,UA: NEGATIVE
SL AMB POCT BLOOD,UA: NEGATIVE
SL AMB POCT CLARITY,UA: ABNORMAL
SL AMB POCT COLOR,UA: ABNORMAL
SL AMB POCT KETONES,UA: NEGATIVE
SL AMB POCT NITRITE,UA: NEGATIVE
SL AMB POCT PH,UA: 6.5
SL AMB POCT SPECIFIC GRAVITY,UA: 1.01
SL AMB POCT URINE PROTEIN: ABNORMAL
SL AMB POCT UROBILINOGEN: NORMAL

## 2022-08-02 PROCEDURE — 81002 URINALYSIS NONAUTO W/O SCOPE: CPT | Performed by: PHYSICIAN ASSISTANT

## 2022-08-02 PROCEDURE — 99213 OFFICE O/P EST LOW 20 MIN: CPT | Performed by: PHYSICIAN ASSISTANT

## 2022-08-02 PROCEDURE — 87086 URINE CULTURE/COLONY COUNT: CPT | Performed by: PHYSICIAN ASSISTANT

## 2022-08-02 PROCEDURE — 87147 CULTURE TYPE IMMUNOLOGIC: CPT | Performed by: PHYSICIAN ASSISTANT

## 2022-08-02 RX ORDER — CIPROFLOXACIN 500 MG/1
500 TABLET, FILM COATED ORAL EVERY 12 HOURS SCHEDULED
Qty: 14 TABLET | Refills: 0 | Status: SHIPPED | OUTPATIENT
Start: 2022-08-02 | End: 2022-08-09

## 2022-08-02 NOTE — PROGRESS NOTES
[facility]        NAME: Colleen Plummer is a 80 y o  female  : 1931    MRN: [id number]  DATE: 2022  TIME: 8:50 AM    Assessment and Plan   Acute cystitis without hematuria [N30 00]  1  Acute cystitis without hematuria  POCT urine dip    Urine culture    ciprofloxacin (CIPRO) 500 mg tablet     Offered new referral for different urologists  Patient declined at this time and would like to be to [place] for her acute UTI  Patient Instructions   Take antibiotic as prescribed  Complete full dose of antibiotics even if symptoms begin to improve or resolve  May use OTC Tylenol for fever  DRINK LOTS OF FLUIDS  Observe for signs of worsening infection including increased pain, discharge, blood in the urine, back or flank pain, fever or chills, or persistent symptoms  Your symptoms should begin to improve over the next couple days  Follow up with PCP in 3-5 days  Proceed to  ER if symptoms worsen  Chief Complaint     Chief Complaint   Patient presents with    Possible UTI     C/O burning and frequency of urination, onset last PM          History of Present Illness       Patient is a 80-year-old female with significant past medical history of hypertension, hyperlipidemia, and recurrent UTIs presents the office complaining of urinary frequency and dysuria since last night  Patient saw Urology approximately 2 months ago and reports "they did nothing for me"  Urinary Tract Infection   This is a recurrent problem  The current episode started yesterday  The problem occurs every urination  The problem has been gradually worsening  There has been no fever  Associated symptoms include frequency  Pertinent negatives include no chills, discharge, flank pain, hematuria, hesitancy, nausea, urgency or vomiting  Her past medical history is significant for recurrent UTIs  Review of Systems   Review of Systems   Constitutional: Negative for chills and fever     Gastrointestinal: Negative for abdominal pain, diarrhea, nausea and vomiting  Genitourinary: Positive for dysuria and frequency  Negative for decreased urine volume, difficulty urinating, enuresis, flank pain, hematuria, hesitancy, pelvic pain, urgency, vaginal bleeding, vaginal discharge and vaginal pain  Skin: Negative for rash           Current Medications       Current Outpatient Medications:     aspirin 81 mg chewable tablet, Chew 81 mg daily, Disp: , Rfl:     ciprofloxacin (CIPRO) 500 mg tablet, Take 1 tablet (500 mg total) by mouth every 12 (twelve) hours for 7 days, Disp: 14 tablet, Rfl: 0    co-enzyme Q-10 30 MG capsule, Take 30 mg by mouth 3 (three) times a day, Disp: , Rfl:     CRANBERRY PO, Take by mouth, Disp: , Rfl:     diazepam (VALIUM) 5 mg tablet, 5 mg every 6 (six) hours as needed , Disp: , Rfl:     docusate sodium (COLACE) 50 mg capsule, Take 100 mg by mouth 2 (two) times a day , Disp: , Rfl:     ELDERBERRY PO, Take by mouth, Disp: , Rfl:     GARLIC PO, Take by mouth, Disp: , Rfl:     magnesium oxide (MAG-OX) 400 mg tablet, Take by mouth, Disp: , Rfl:     omega-3-acid ethyl esters (LOVAZA) 1 g capsule, Take 2 g by mouth 2 (two) times a day, Disp: , Rfl:     simvastatin (ZOCOR) 10 mg tablet, 10 mg , Disp: , Rfl:     VITAMIN D, CHOLECALCIFEROL, PO, Take 2,000 Units by mouth  , Disp: , Rfl:     Zinc 50 MG TABS, Take by mouth, Disp: , Rfl:     Diclofenac Sodium (VOLTAREN) 1 %, , Disp: , Rfl:     estradiol (ESTRACE VAGINAL) 0 1 mg/g vaginal cream, Insert 1 g into the vagina 2 (two) times a week Apply pea size amount to external vagina 2 times per week (Patient not taking: Reported on 5/19/2022), Disp: 42 5 g, Rfl: 1    HYDROcodone-acetaminophen (NORCO) 5-325 mg per tablet, , Disp: , Rfl:     lisinopril (ZESTRIL) 40 mg tablet, Take 40 mg by mouth daily , Disp: , Rfl:     saccharomyces boulardii (FLORASTOR) 250 mg capsule, Take 250 mg by mouth 2 (two) times a day (Patient not taking: Reported on 8/2/2022), Disp: , Rfl:     zinc gluconate 50 mg tablet, Take 50 mg by mouth daily   (Patient not taking: Reported on 5/19/2022), Disp: , Rfl:     Current Allergies     Allergies as of 08/02/2022 - Reviewed 08/02/2022   Allergen Reaction Noted    Estradiol Other (See Comments) 05/19/2022    Sulfamethoxazole-trimethoprim Other (See Comments) 07/26/2020    Ibuprofen Other (See Comments) 10/03/2012            The following portions of the patient's history were reviewed and updated as appropriate: allergies, current medications, past family history, past medical history, past social history, past surgical history and problem list      Past Medical History:   Diagnosis Date    Arthritis     High cholesterol     Hypertension     Urinary tract infection        Past Surgical History:   Procedure Laterality Date    APPENDECTOMY      CHOLECYSTECTOMY      HIP SURGERY      HYSTERECTOMY      JOINT REPLACEMENT      KNEE SURGERY         Family History   Problem Relation Age of Onset    Cancer Mother     Stroke Father          Medications have been verified  Objective   /65   Pulse 81   Temp (!) 97 °F (36 1 °C)   Resp 18   Ht 5' 2" (1 575 m)   Wt 70 3 kg (155 lb)   SpO2 96%   BMI 28 35 kg/m²   No LMP recorded  Patient has had a hysterectomy  Physical Exam     Physical Exam  Vitals and nursing note reviewed  Constitutional:       Appearance: Normal appearance  She is well-developed  HENT:      Head: Normocephalic and atraumatic  Right Ear: External ear normal       Left Ear: External ear normal       Nose: Nose normal    Eyes:      General: Lids are normal    Cardiovascular:      Rate and Rhythm: Normal rate and regular rhythm  Pulses: Normal pulses  Heart sounds: Normal heart sounds  No murmur heard  No friction rub  No gallop  Pulmonary:      Effort: Pulmonary effort is normal       Breath sounds: Normal breath sounds  No wheezing, rhonchi or rales     Abdominal:      General: Bowel sounds are normal       Palpations: Abdomen is soft  Tenderness: There is no abdominal tenderness  There is no right CVA tenderness or left CVA tenderness  Musculoskeletal:         General: Normal range of motion  Lymphadenopathy:      Cervical: No cervical adenopathy  Skin:     General: Skin is warm and dry  Capillary Refill: Capillary refill takes less than 2 seconds  Findings: No rash  Neurological:      Mental Status: She is alert         Urine dip:    LEUKOCYTE ESTERASE,UA Moderate    NITRITE,UA negative    SL AMB POCT UROBILINOGEN normal    POCT URINE PROTEIN Trace     PH,UA 6 5    BLOOD,UA negative    SPECIFIC GRAVITY,UA 1 015    KETONES,UA negative    BILIRUBIN,UA negative    GLUCOSE, UA negative     COLOR,UA ines    CLARITY,UA cloudy

## 2022-08-03 ENCOUNTER — TELEPHONE (OUTPATIENT)
Dept: URGENT CARE | Facility: CLINIC | Age: 87
End: 2022-08-03

## 2022-08-03 DIAGNOSIS — N30.00 ACUTE CYSTITIS WITHOUT HEMATURIA: Primary | ICD-10-CM

## 2022-08-03 LAB — BACTERIA UR CULT: ABNORMAL

## 2022-08-03 RX ORDER — CEPHALEXIN 500 MG/1
500 CAPSULE ORAL EVERY 12 HOURS SCHEDULED
Qty: 14 CAPSULE | Refills: 0 | Status: SHIPPED | OUTPATIENT
Start: 2022-08-03 | End: 2022-08-03 | Stop reason: CLARIF

## 2022-08-03 NOTE — TELEPHONE ENCOUNTER
Left voicemail for patient's son to call the office  Urine culture showed positive growth of group B strep  Possible colonization but will treat due to patient's dizziness  Patient to be treated with Keflex twice a day for 1 week  Advised follow-up with family doctor

## 2022-08-03 NOTE — TELEPHONE ENCOUNTER
She returned voicemail regarding urine culture results  Patient was already placed on ciprofloxacin and thus no longer needs Keflex  Will cancel Keflex  Patient advised to continue taking ciprofloxacin as prescribed and to follow-up with family doctor

## 2022-09-10 ENCOUNTER — OFFICE VISIT (OUTPATIENT)
Dept: URGENT CARE | Facility: CLINIC | Age: 87
End: 2022-09-10
Payer: COMMERCIAL

## 2022-09-10 VITALS
BODY MASS INDEX: 28.52 KG/M2 | SYSTOLIC BLOOD PRESSURE: 153 MMHG | TEMPERATURE: 97.1 F | HEART RATE: 92 BPM | DIASTOLIC BLOOD PRESSURE: 67 MMHG | RESPIRATION RATE: 18 BRPM | WEIGHT: 155 LBS | HEIGHT: 62 IN | OXYGEN SATURATION: 96 %

## 2022-09-10 DIAGNOSIS — R35.0 FREQUENCY OF MICTURITION: Primary | ICD-10-CM

## 2022-09-10 LAB
SL AMB  POCT GLUCOSE, UA: NEGATIVE
SL AMB LEUKOCYTE ESTERASE,UA: ABNORMAL
SL AMB POCT BILIRUBIN,UA: NEGATIVE
SL AMB POCT BLOOD,UA: ABNORMAL
SL AMB POCT CLARITY,UA: ABNORMAL
SL AMB POCT COLOR,UA: YELLOW
SL AMB POCT KETONES,UA: NEGATIVE
SL AMB POCT NITRITE,UA: ABNORMAL
SL AMB POCT PH,UA: 5
SL AMB POCT SPECIFIC GRAVITY,UA: 1.02
SL AMB POCT URINE PROTEIN: ABNORMAL
SL AMB POCT UROBILINOGEN: 0.2

## 2022-09-10 PROCEDURE — 87077 CULTURE AEROBIC IDENTIFY: CPT | Performed by: NURSE PRACTITIONER

## 2022-09-10 PROCEDURE — 87086 URINE CULTURE/COLONY COUNT: CPT | Performed by: NURSE PRACTITIONER

## 2022-09-10 PROCEDURE — 87186 SC STD MICRODIL/AGAR DIL: CPT | Performed by: NURSE PRACTITIONER

## 2022-09-10 PROCEDURE — 99213 OFFICE O/P EST LOW 20 MIN: CPT | Performed by: NURSE PRACTITIONER

## 2022-09-10 PROCEDURE — 81002 URINALYSIS NONAUTO W/O SCOPE: CPT | Performed by: NURSE PRACTITIONER

## 2022-09-10 RX ORDER — AMOXICILLIN AND CLAVULANATE POTASSIUM 875; 125 MG/1; MG/1
1 TABLET, FILM COATED ORAL EVERY 12 HOURS SCHEDULED
Qty: 14 TABLET | Refills: 0 | Status: SHIPPED | OUTPATIENT
Start: 2022-09-10 | End: 2022-09-17

## 2022-09-10 NOTE — PROGRESS NOTES
[facility]        NAME: Kirstin Hart is a 80 y o  female  : 1931    MRN: [id number]  DATE: September 10, 2022  TIME: 8:34 AM    Assessment and Plan   Frequency of micturition [R35 0]  1  Frequency of micturition  POCT urine dip    amoxicillin-clavulanate (AUGMENTIN) 875-125 mg per tablet    Urine culture     Last 3 UTis with GBS -   Will start augmentin   Instructions provided on prevention  F/u with pcp   Pt in agreement with plan of care    Patient Instructions     Follow up with PCP in 3-5 days  Proceed to  ER if symptoms worsen  Chief Complaint     Chief Complaint   Patient presents with    Possible UTI     C/o frequency and burning upon urination, onset lastPM          History of Present Illness   Kirstin Hart presents to the clinic c/o      Pt with c/o urinary urgency, frequency, and burning -   Started last night  H/o recurrent UTIs   Last one about a month or so ago         Review of Systems   Review of Systems   All other systems reviewed and are negative          Current Medications     Long-Term Medications   Medication Sig Dispense Refill    aspirin 81 mg chewable tablet Chew 81 mg daily      diazepam (VALIUM) 5 mg tablet 5 mg every 6 (six) hours as needed       Diclofenac Sodium (VOLTAREN) 1 %       docusate sodium (COLACE) 50 mg capsule Take 100 mg by mouth 2 (two) times a day       omega-3-acid ethyl esters (LOVAZA) 1 g capsule Take 2 g by mouth 2 (two) times a day      simvastatin (ZOCOR) 10 mg tablet 10 mg       Zinc 50 MG TABS Take by mouth      estradiol (ESTRACE VAGINAL) 0 1 mg/g vaginal cream Insert 1 g into the vagina 2 (two) times a week Apply pea size amount to external vagina 2 times per week (Patient not taking: No sig reported) 42 5 g 1    lisinopril (ZESTRIL) 40 mg tablet Take 40 mg by mouth daily       zinc gluconate 50 mg tablet Take 50 mg by mouth daily   (Patient not taking: No sig reported)         Current Allergies     Allergies as of 09/10/2022 - Reviewed 09/10/2022   Allergen Reaction Noted    Estradiol Other (See Comments) 05/19/2022    Sulfamethoxazole-trimethoprim Other (See Comments) 07/26/2020    Ibuprofen Other (See Comments) 10/03/2012            The following portions of the patient's history were reviewed and updated as appropriate: allergies, current medications, past family history, past medical history, past social history, past surgical history and problem list     Objective   /67   Pulse 92   Temp (!) 97 1 °F (36 2 °C)   Resp 18   Ht 5' 2" (1 575 m)   Wt 70 3 kg (155 lb)   SpO2 96%   BMI 28 35 kg/m²        Physical Exam     Physical Exam  Vitals and nursing note reviewed  Constitutional:       Appearance: Normal appearance  She is well-developed  HENT:      Head: Normocephalic and atraumatic  Eyes:      General: Lids are normal       Conjunctiva/sclera: Conjunctivae normal    Cardiovascular:      Rate and Rhythm: Normal rate and regular rhythm  Heart sounds: Normal heart sounds, S1 normal and S2 normal    Pulmonary:      Effort: Pulmonary effort is normal       Breath sounds: Normal breath sounds  Abdominal:      Tenderness: There is abdominal tenderness in the suprapubic area  There is no right CVA tenderness or left CVA tenderness  Musculoskeletal:      Cervical back: Normal range of motion and neck supple  Skin:     General: Skin is warm and dry  Neurological:      Mental Status: She is alert and oriented to person, place, and time  Psychiatric:         Speech: Speech normal          Behavior: Behavior normal  Behavior is cooperative  Thought Content:  Thought content normal          Judgment: Judgment normal

## 2022-09-10 NOTE — PATIENT INSTRUCTIONS
Urinary Tract Infection in Older Adults   WHAT YOU NEED TO KNOW:   A urinary tract infection (UTI) is caused by bacteria that get inside your urinary tract  Your urinary tract includes your kidneys, ureters, bladder, and urethra  Urine is made in your kidneys, and it flows from the ureters to the bladder  Urine leaves the bladder through the urethra  A UTI is more common in your lower urinary tract, which includes your bladder and urethra  DISCHARGE INSTRUCTIONS:   Return to the emergency department if:   You are urinating very little or not at all  You are vomiting  You have a high fever with shaking chills  You have side or back pain that gets worse  Call your doctor if:   You have a fever  You are a woman and you have increased white or yellow discharge from your vagina  You do not feel better after 2 days of taking antibiotics  You have questions or concerns about your condition or care  Medicines:   Medicines  help treat the bacterial infection or decrease pain and burning when you urinate  You may also need medicines to decrease the urge to urinate often  If you have UTIs often (called recurrent UTIs), you may be given antibiotics to take regularly  You will be given directions for when and how to use antibiotics  The goal is to prevent UTIs but not cause antibiotic resistance by using antibiotics too often  Take your medicine as directed  Contact your healthcare provider if you think your medicine is not helping or if you have side effects  Tell him or her if you are allergic to any medicine  Keep a list of the medicines, vitamins, and herbs you take  Include the amounts, and when and why you take them  Bring the list or the pill bottles to follow-up visits  Carry your medicine list with you in case of an emergency  Self-care:   Drink liquids as directed  Liquids can help flush bacteria from your urinary tract   Ask how much liquid to drink each day and which liquids are best for you  You may need to drink more liquids than usual to help flush out the bacteria  Do not drink alcohol, caffeine, and citrus juices  These can irritate your bladder and increase your symptoms  Apply heat  on your abdomen for 20 to 30 minutes every 2 hours for as many days as directed  Heat helps decrease discomfort and pressure in your bladder  Prevent a UTI:   Urinate when you feel the urge  Do not hold your urine  Bacteria can grow if urine stays in the bladder too long  It may be helpful to urinate at least every 3 to 4 hours  Urinate after you have sex  to flush away bacteria that can enter your urinary tract during sex  Wear cotton underwear and clothes that are loose  Tight pants and nylon underwear can trap moisture and cause bacteria to grow  Cranberry juice or cranberry supplements  may help prevent UTIs  Your healthcare provider can recommend the right juice or supplement for you  Women should wipe front to back  after urinating or having a bowel movement  This may prevent germs from getting into the urinary tract  Do not douche or use feminine deodorants  These can change the chemical balance in your vagina  You may also be given vaginal estrogen medicine  This medicine helps prevent recurrent UTIs in women who have gone through menopause or are in shira-menopause  Follow up with your doctor as directed:  Write down your questions so you remember to ask them during your visits  © Copyright [employer] 2022 Information is for End User's use only and may not be sold, redistributed or otherwise used for commercial purposes  All illustrations and images included in CareNotes® are the copyrighted property of [employer] M [employer] Inc  or [facility]   The above information is an  only  It is not intended as medical advice for individual conditions or treatments   Talk to your doctor, nurse or pharmacist before following any medical regimen to see if it is safe and effective for you

## 2022-09-11 LAB — BACTERIA UR CULT: ABNORMAL

## 2022-09-12 LAB — BACTERIA UR CULT: ABNORMAL

## 2022-09-20 ENCOUNTER — OFFICE VISIT (OUTPATIENT)
Dept: URGENT CARE | Facility: CLINIC | Age: 87
End: 2022-09-20
Payer: COMMERCIAL

## 2022-09-20 VITALS
TEMPERATURE: 97 F | OXYGEN SATURATION: 100 % | DIASTOLIC BLOOD PRESSURE: 70 MMHG | HEIGHT: 61 IN | HEART RATE: 79 BPM | RESPIRATION RATE: 16 BRPM | BODY MASS INDEX: 29.27 KG/M2 | SYSTOLIC BLOOD PRESSURE: 148 MMHG | WEIGHT: 155 LBS

## 2022-09-20 DIAGNOSIS — R35.0 URINE FREQUENCY: Primary | ICD-10-CM

## 2022-09-20 LAB
SL AMB  POCT GLUCOSE, UA: NEGATIVE
SL AMB LEUKOCYTE ESTERASE,UA: ABNORMAL
SL AMB POCT BILIRUBIN,UA: NEGATIVE
SL AMB POCT BLOOD,UA: ABNORMAL
SL AMB POCT CLARITY,UA: CLEAR
SL AMB POCT COLOR,UA: YELLOW
SL AMB POCT KETONES,UA: NEGATIVE
SL AMB POCT NITRITE,UA: POSITIVE
SL AMB POCT PH,UA: 6.5
SL AMB POCT SPECIFIC GRAVITY,UA: 1.01
SL AMB POCT URINE PROTEIN: 100
SL AMB POCT UROBILINOGEN: NORMAL

## 2022-09-20 PROCEDURE — 81002 URINALYSIS NONAUTO W/O SCOPE: CPT | Performed by: PHYSICIAN ASSISTANT

## 2022-09-20 PROCEDURE — 29125 APPL SHORT ARM SPLINT STATIC: CPT | Performed by: PHYSICIAN ASSISTANT

## 2022-09-20 PROCEDURE — 87186 SC STD MICRODIL/AGAR DIL: CPT | Performed by: PHYSICIAN ASSISTANT

## 2022-09-20 PROCEDURE — 87077 CULTURE AEROBIC IDENTIFY: CPT | Performed by: PHYSICIAN ASSISTANT

## 2022-09-20 PROCEDURE — 99213 OFFICE O/P EST LOW 20 MIN: CPT | Performed by: PHYSICIAN ASSISTANT

## 2022-09-20 PROCEDURE — 87086 URINE CULTURE/COLONY COUNT: CPT | Performed by: PHYSICIAN ASSISTANT

## 2022-09-20 RX ORDER — CEPHALEXIN 500 MG/1
500 CAPSULE ORAL EVERY 8 HOURS SCHEDULED
Qty: 21 CAPSULE | Refills: 0 | Status: SHIPPED | OUTPATIENT
Start: 2022-09-20 | End: 2022-09-27

## 2022-09-20 NOTE — PROGRESS NOTES
[facility]        NAME: Mahogany Jenkins is a 80 y o  female  : 1931    MRN: [id number]  DATE: 2022  TIME: 4:20 PM    Assessment and Plan   Urine frequency [R35 0]  1  Urine frequency  POCT urine dip    Urine culture         Patient Instructions       Follow up with PCP in 3-5 days  Proceed to  ER if symptoms worsen  Chief Complaint     Chief Complaint   Patient presents with    Urinary Frequency     Symptoms started today, urine frequency, burning with urination, pain in lower abdomen            History of Present Illness       HPI    Review of Systems   Review of Systems      Current Medications       Current Outpatient Medications:     aspirin 81 mg chewable tablet, Chew 81 mg daily, Disp: , Rfl:     co-enzyme Q-10 30 MG capsule, Take 30 mg by mouth 3 (three) times a day, Disp: , Rfl:     CRANBERRY PO, Take by mouth, Disp: , Rfl:     diazepam (VALIUM) 5 mg tablet, 5 mg every 6 (six) hours as needed , Disp: , Rfl:     Diclofenac Sodium (VOLTAREN) 1 %, , Disp: , Rfl:     docusate sodium (COLACE) 50 mg capsule, Take 100 mg by mouth 2 (two) times a day , Disp: , Rfl:     ELDERBERRY PO, Take by mouth, Disp: , Rfl:     GARLIC PO, Take by mouth, Disp: , Rfl:     magnesium oxide (MAG-OX) 400 mg tablet, Take by mouth, Disp: , Rfl:     omega-3-acid ethyl esters (LOVAZA) 1 g capsule, Take 2 g by mouth 2 (two) times a day, Disp: , Rfl:     simvastatin (ZOCOR) 10 mg tablet, 10 mg , Disp: , Rfl:     VITAMIN D, CHOLECALCIFEROL, PO, Take 2,000 Units by mouth, Disp: , Rfl:     Zinc 50 MG TABS, Take by mouth, Disp: , Rfl:     estradiol (ESTRACE VAGINAL) 0 1 mg/g vaginal cream, Insert 1 g into the vagina 2 (two) times a week Apply pea size amount to external vagina 2 times per week (Patient not taking: No sig reported), Disp: 42 5 g, Rfl: 1    HYDROcodone-acetaminophen (NORCO) 5-325 mg per tablet, , Disp: , Rfl:     lisinopril (ZESTRIL) 40 mg tablet, Take 40 mg by mouth daily , Disp: , Rfl:     saccharomyces boulardii (FLORASTOR) 250 mg capsule, Take 250 mg by mouth 2 (two) times a day (Patient not taking: No sig reported), Disp: , Rfl:     zinc gluconate 50 mg tablet, Take 50 mg by mouth daily   (Patient not taking: No sig reported), Disp: , Rfl:     Current Allergies     Allergies as of 09/20/2022 - Reviewed 09/20/2022   Allergen Reaction Noted    Estradiol Other (See Comments) 05/19/2022    Sulfamethoxazole-trimethoprim Other (See Comments) 07/26/2020    Ibuprofen Other (See Comments) 10/03/2012            The following portions of the patient's history were reviewed and updated as appropriate: allergies, current medications, past family history, past medical history, past social history, past surgical history and problem list      Past Medical History:   Diagnosis Date    Arthritis     High cholesterol     Hypertension     Urinary tract infection        Past Surgical History:   Procedure Laterality Date    APPENDECTOMY      CHOLECYSTECTOMY      HIP SURGERY      HYSTERECTOMY      JOINT REPLACEMENT      KNEE SURGERY         Family History   Problem Relation Age of Onset    Cancer Mother     Stroke Father          Medications have been verified  Objective   /70   Pulse 79   Temp (!) 97 °F (36 1 °C)   Resp 16   Ht 5' 1" (1 549 m)   Wt 70 3 kg (155 lb)   SpO2 100%   BMI 29 29 kg/m²   No LMP recorded  Patient has had a hysterectomy         Physical Exam     Physical Exam 148/70   Pulse 79   Temp (!) 97 °F (36 1 °C)   Resp 16   Ht 5' 1" (1 549 m)   Wt 70 3 kg (155 lb)   SpO2 100%   BMI 29 29 kg/m²   No LMP recorded  Patient has had a hysterectomy  Physical Exam     Physical Exam  Vitals reviewed  Constitutional:       General: She is not in acute distress  Appearance: She is well-developed  HENT:      Mouth/Throat:      Mouth: Mucous membranes are moist       Pharynx: Oropharynx is clear  Cardiovascular:      Rate and Rhythm: Normal rate and regular rhythm  Pulses: Normal pulses  Heart sounds: Normal heart sounds  No murmur heard  Pulmonary:      Effort: Pulmonary effort is normal  No respiratory distress  Breath sounds: Normal breath sounds  Abdominal:      Tenderness: There is no right CVA tenderness or left CVA tenderness  Neurological:      Mental Status: She is alert and oriented to person, place, and time

## 2022-09-20 NOTE — PATIENT INSTRUCTIONS
Urinary Tract Infection in Women   WHAT YOU NEED TO KNOW:   A urinary tract infection (UTI) is caused by bacteria that get inside your urinary tract  Most bacteria that enter your urinary tract come out when you urinate  If the bacteria stay in your urinary tract, you may get an infection  Your urinary tract includes your kidneys, ureters, bladder, and urethra  Urine is made in your kidneys, and it flows from the ureters to the bladder  Urine leaves the bladder through the urethra  A UTI is more common in your lower urinary tract, which includes your bladder and urethra  DISCHARGE INSTRUCTIONS:   Return to the emergency department if:   You are urinating very little or not at all  You have a high fever with shaking chills  You have side or back pain that gets worse  Call your doctor if:   You have a fever  You do not feel better after 2 days of taking antibiotics  You are vomiting  You have questions or concerns about your condition or care  Medicines:   Antibiotics  help fight a bacterial infection  If you have UTIs often (called recurrent UTIs), you may be given antibiotics to take regularly  You will be given directions for when and how to use antibiotics  The goal is to prevent UTIs but not cause antibiotic resistance by using antibiotics too often  Medicines  may be given to decrease pain and burning when you urinate  They will also help decrease the feeling that you need to urinate often  These medicines will make your urine orange or red  Take your medicine as directed  Contact your healthcare provider if you think your medicine is not helping or if you have side effects  Tell him or her if you are allergic to any medicine  Keep a list of the medicines, vitamins, and herbs you take  Include the amounts, and when and why you take them  Bring the list or the pill bottles to follow-up visits  Carry your medicine list with you in case of an emergency      Follow up with your doctor as directed:  Write down your questions so you remember to ask them during your visits  Prevent another UTI:   Empty your bladder often  Urinate and empty your bladder as soon as you feel the need  Do not hold your urine for long periods of time  Wipe from front to back after you urinate or have a bowel movement  This will help prevent germs from getting into your urinary tract through your urethra  Drink liquids as directed  Ask how much liquid to drink each day and which liquids are best for you  You may need to drink more liquids than usual to help flush out the bacteria  Do not drink alcohol, caffeine, or citrus juices  These can irritate your bladder and increase your symptoms  Your healthcare provider may recommend cranberry juice to help prevent a UTI  Urinate after you have sex  This can help flush out bacteria passed during sex  Do not douche or use feminine deodorants  These can change the chemical balance in your vagina  Change sanitary pads or tampons often  This will help prevent germs from getting into your urinary tract  Talk to your healthcare provider about your birth control method  You may need to change your method if it is increasing your risk for UTIs  Wear cotton underwear and clothes that are loose  Tight pants and nylon underwear can trap moisture and cause bacteria to grow  Vaginal estrogen may be recommended  This medicine helps prevent UTIs in women who have gone through menopause or are in shira-menopause  Do pelvic muscle exercises often  Pelvic muscle exercises may help you start and stop urinating  Strong pelvic muscles may help you empty your bladder easier  Squeeze these muscles tightly for 5 seconds like you are trying to hold back urine  Then relax for 5 seconds  Gradually work up to squeezing for 10 seconds  Do 3 sets of 15 repetitions a day, or as directed      © Copyright [employer] 2022 Information is for End User's use only and may not be sold, redistributed or otherwise used for commercial purposes  All illustrations and images included in CareNotes® are the copyrighted property of [employer] M [employer] Inc  or [employer]  The above information is an  only  It is not intended as medical advice for individual conditions or treatments  Talk to your doctor, nurse or pharmacist before following any medical regimen to see if it is safe and effective for you

## 2022-09-22 LAB
BACTERIA UR CULT: ABNORMAL
BACTERIA UR CULT: ABNORMAL

## 2023-06-18 ENCOUNTER — OFFICE VISIT (OUTPATIENT)
Dept: URGENT CARE | Facility: CLINIC | Age: 88
End: 2023-06-18
Payer: COMMERCIAL

## 2023-06-18 VITALS
SYSTOLIC BLOOD PRESSURE: 146 MMHG | BODY MASS INDEX: 28.52 KG/M2 | DIASTOLIC BLOOD PRESSURE: 64 MMHG | WEIGHT: 155 LBS | OXYGEN SATURATION: 96 % | HEART RATE: 80 BPM | HEIGHT: 62 IN | TEMPERATURE: 97.6 F | RESPIRATION RATE: 18 BRPM

## 2023-06-18 DIAGNOSIS — N30.01 ACUTE CYSTITIS WITH HEMATURIA: ICD-10-CM

## 2023-06-18 DIAGNOSIS — R35.0 URINE FREQUENCY: Primary | ICD-10-CM

## 2023-06-18 LAB
SL AMB  POCT GLUCOSE, UA: ABNORMAL
SL AMB LEUKOCYTE ESTERASE,UA: ABNORMAL
SL AMB POCT BILIRUBIN,UA: ABNORMAL
SL AMB POCT BLOOD,UA: ABNORMAL
SL AMB POCT CLARITY,UA: CLEAR
SL AMB POCT COLOR,UA: ABNORMAL
SL AMB POCT KETONES,UA: ABNORMAL
SL AMB POCT NITRITE,UA: ABNORMAL
SL AMB POCT PH,UA: 6
SL AMB POCT SPECIFIC GRAVITY,UA: 1.01
SL AMB POCT URINE PROTEIN: ABNORMAL
SL AMB POCT UROBILINOGEN: NORMAL

## 2023-06-18 PROCEDURE — 87086 URINE CULTURE/COLONY COUNT: CPT

## 2023-06-18 PROCEDURE — 81002 URINALYSIS NONAUTO W/O SCOPE: CPT

## 2023-06-18 PROCEDURE — 99212 OFFICE O/P EST SF 10 MIN: CPT

## 2023-06-18 RX ORDER — CEPHALEXIN 500 MG/1
500 CAPSULE ORAL EVERY 12 HOURS SCHEDULED
Qty: 14 CAPSULE | Refills: 0 | Status: SHIPPED | OUTPATIENT
Start: 2023-06-18 | End: 2023-06-25

## 2023-06-18 NOTE — PROGRESS NOTES
[facility]        NAME: Megan An is a 80 y o  female  : 1931    MRN: [id number]  DATE: 2023  TIME: 11:30 AM    Assessment and Plan   Urine frequency [R35 0]  1  Urine frequency  POCT urine dip    Urine culture    cephalexin (KEFLEX) 500 mg capsule            Patient Instructions     Take antibiotics as prescribed and finish the entire dose  Drink plenty of fluids  Avoid taking baths  Avoid long intervals between urinating  Do not wear tight fitting undergarments  Follow up with PCP in 3-5 days  Proceed to  ER if symptoms worsen  Chief Complaint     Chief Complaint   Patient presents with   • Possible UTI     Frequent urination through the night  History of Present Illness       Patient is a 80YOF with a past medical history significant for uti's, htn and hyperlipidemia  She presents today complaining of urinary frequency since last night  She denies any fever, chills, dysuria, abdominal pain, or back pain  She has no history of kidney stones or kidney infections  She has taken Keflex in the past and done well   Review of Systems   Review of Systems   Constitutional: Negative for activity change, appetite change, chills and fever  Gastrointestinal: Negative for abdominal pain, diarrhea, nausea and vomiting  Genitourinary: Positive for frequency  Negative for dysuria, flank pain, hematuria and urgency  All other systems reviewed and are negative          Current Medications       Current Outpatient Medications:   •  aspirin 81 mg chewable tablet, Chew 81 mg daily, Disp: , Rfl:   •  cephalexin (KEFLEX) 500 mg capsule, Take 1 capsule (500 mg total) by mouth every 12 (twelve) hours for 7 days, Disp: 14 capsule, Rfl: 0  •  co-enzyme Q-10 30 MG capsule, Take 30 mg by mouth 3 (three) times a day, Disp: , Rfl:   •  CRANBERRY PO, Take by mouth, Disp: , Rfl:   •  diazepam (VALIUM) 5 mg tablet, 5 mg every 6 (six) hours as needed , Disp: , Rfl:   •  Diclofenac Sodium (VOLTAREN) 1 %, , Disp: , Rfl:   •  docusate sodium (COLACE) 50 mg capsule, Take 100 mg by mouth 2 (two) times a day , Disp: , Rfl:   •  ELDERBERRY PO, Take by mouth, Disp: , Rfl:   •  GARLIC PO, Take by mouth, Disp: , Rfl:   •  lisinopril (ZESTRIL) 40 mg tablet, Take 40 mg by mouth daily , Disp: , Rfl:   •  magnesium oxide (MAG-OX) 400 mg tablet, Take by mouth, Disp: , Rfl:   •  omega-3-acid ethyl esters (LOVAZA) 1 g capsule, Take 2 g by mouth 2 (two) times a day, Disp: , Rfl:   •  simvastatin (ZOCOR) 10 mg tablet, 10 mg , Disp: , Rfl:   •  VITAMIN D, CHOLECALCIFEROL, PO, Take 2,000 Units by mouth, Disp: , Rfl:   •  Zinc 50 MG TABS, Take by mouth, Disp: , Rfl:   •  estradiol (ESTRACE VAGINAL) 0 1 mg/g vaginal cream, Insert 1 g into the vagina 2 (two) times a week Apply pea size amount to external vagina 2 times per week (Patient not taking: Reported on 5/19/2022), Disp: 42 5 g, Rfl: 1  •  HYDROcodone-acetaminophen (NORCO) 5-325 mg per tablet, , Disp: , Rfl:   •  saccharomyces boulardii (FLORASTOR) 250 mg capsule, Take 250 mg by mouth 2 (two) times a day (Patient not taking: Reported on 8/2/2022), Disp: , Rfl:   •  zinc gluconate 50 mg tablet, Take 50 mg by mouth daily   (Patient not taking: Reported on 5/19/2022), Disp: , Rfl:     Current Allergies     Allergies as of 06/18/2023 - Reviewed 06/18/2023   Allergen Reaction Noted   • Estradiol Other (See Comments) 05/19/2022   • Sulfamethoxazole-trimethoprim Other (See Comments) 07/26/2020   • Ibuprofen Other (See Comments) 10/03/2012            The following portions of the patient's history were reviewed and updated as appropriate: allergies, current medications, past family history, past medical history, past social history, past surgical history and problem list      Past Medical History:   Diagnosis Date   • Arthritis    • High cholesterol    • Hypertension    • Urinary tract infection        Past Surgical History:   Procedure Laterality Date   • "APPENDECTOMY     • CHOLECYSTECTOMY     • HIP SURGERY     • HYSTERECTOMY     • JOINT REPLACEMENT     • KNEE SURGERY         Family History   Problem Relation Age of Onset   • Cancer Mother    • Stroke Father          Medications have been verified  Objective   /64   Pulse 80   Temp 97 6 °F (36 4 °C)   Resp 18   Ht 5' 2\" (1 575 m)   Wt 70 3 kg (155 lb)   SpO2 96%   BMI 28 35 kg/m²        Physical Exam     Physical Exam  Vitals and nursing note reviewed  Constitutional:       General: She is not in acute distress  Appearance: Normal appearance  She is normal weight  She is not ill-appearing or toxic-appearing  Cardiovascular:      Rate and Rhythm: Normal rate and regular rhythm  Pulses: Normal pulses  Heart sounds: Normal heart sounds  Pulmonary:      Effort: Pulmonary effort is normal       Breath sounds: Normal breath sounds  Abdominal:      General: Abdomen is flat  Palpations: Abdomen is soft  Tenderness: There is no abdominal tenderness  There is no right CVA tenderness or left CVA tenderness  Skin:     General: Skin is warm and dry  Capillary Refill: Capillary refill takes less than 2 seconds  Neurological:      General: No focal deficit present  Mental Status: She is alert and oriented to person, place, and time                     "

## 2023-06-18 NOTE — PATIENT INSTRUCTIONS
Take antibiotics as prescribed and finish the entire dose  Drink plenty of fluids  Avoid taking baths  Avoid long intervals between urinating  Do not wear tight fitting undergarments

## 2023-06-20 LAB — BACTERIA UR CULT: NORMAL

## 2023-08-03 ENCOUNTER — OFFICE VISIT (OUTPATIENT)
Dept: URGENT CARE | Facility: CLINIC | Age: 88
End: 2023-08-03
Payer: COMMERCIAL

## 2023-08-03 VITALS
BODY MASS INDEX: 28.52 KG/M2 | HEART RATE: 83 BPM | OXYGEN SATURATION: 97 % | SYSTOLIC BLOOD PRESSURE: 171 MMHG | DIASTOLIC BLOOD PRESSURE: 75 MMHG | RESPIRATION RATE: 16 BRPM | TEMPERATURE: 98.7 F | WEIGHT: 155 LBS | HEIGHT: 62 IN

## 2023-08-03 DIAGNOSIS — N30.01 ACUTE CYSTITIS WITH HEMATURIA: Primary | ICD-10-CM

## 2023-08-03 LAB
SL AMB  POCT GLUCOSE, UA: NEGATIVE
SL AMB LEUKOCYTE ESTERASE,UA: ABNORMAL
SL AMB POCT BILIRUBIN,UA: ABNORMAL
SL AMB POCT BLOOD,UA: ABNORMAL
SL AMB POCT CLARITY,UA: ABNORMAL
SL AMB POCT COLOR,UA: YELLOW
SL AMB POCT KETONES,UA: NEGATIVE
SL AMB POCT NITRITE,UA: POSITIVE
SL AMB POCT PH,UA: 6.5
SL AMB POCT SPECIFIC GRAVITY,UA: 1.02
SL AMB POCT URINE PROTEIN: ABNORMAL
SL AMB POCT UROBILINOGEN: NORMAL

## 2023-08-03 PROCEDURE — 87086 URINE CULTURE/COLONY COUNT: CPT

## 2023-08-03 PROCEDURE — 81002 URINALYSIS NONAUTO W/O SCOPE: CPT

## 2023-08-03 PROCEDURE — 99213 OFFICE O/P EST LOW 20 MIN: CPT

## 2023-08-03 PROCEDURE — 87077 CULTURE AEROBIC IDENTIFY: CPT

## 2023-08-03 PROCEDURE — 87186 SC STD MICRODIL/AGAR DIL: CPT

## 2023-08-03 RX ORDER — CEPHALEXIN 500 MG/1
500 CAPSULE ORAL EVERY 12 HOURS SCHEDULED
Qty: 14 CAPSULE | Refills: 0 | Status: SHIPPED | OUTPATIENT
Start: 2023-08-03 | End: 2023-08-10

## 2023-08-03 NOTE — PROGRESS NOTES
[facility]        NAME: Gurvinder Painter is a 80 y.o. female  : 1931    MRN: [id number]  DATE: August 3, 2023  TIME: 8:24 AM    Assessment and Plan   Acute cystitis with hematuria [N30.01]  1. Acute cystitis with hematuria  POCT urine dip    Urine culture    Ambulatory Referral to Urology    cephalexin (KEFLEX) 500 mg capsule        Urine dip showing large blood, moderate leukocytes, and + nitrites. Urine culture pending. Will treat with Cephalexin and encouraged continued supportive measures. Referral placed to Urology. Follow up with PCP in 3-5 days or proceed to emergency department for worsening symptoms. Patient verbalized understanding of instructions given. Patient Instructions     Patient Instructions     Urine culture pending  Take antibiotic as prescribed  Referral placed to Urology  Increase fluid intake   Follow up with PCP in 3-5 days. Proceed to  ER if symptoms worsen. Urinary Tract Infection in Older Adults   AMBULATORY CARE:   A urinary tract infection (UTI)  is caused by bacteria that get inside your urinary tract. Your urinary tract includes your kidneys, ureters, bladder, and urethra. A UTI is more common in your lower urinary tract, which includes your bladder and urethra. Common signs and symptoms include the following:   • Fever and chills    • Pain or burning when you urinate    • Urine that smells bad or looks cloudy, or blood in your urine    • Urinating more often or waking from sleep to urinate    • Sudden, strong need to urinate    • Pain or pressure in your lower abdomen     • Leaking urine    • Confusion or agitation    • Fatigue, shakiness, and weakness    Seek care immediately if:   • You become confused or agitated. • You fall down. • You are urinating very little or not at all. • You are vomiting. • You have a high fever with shaking chills. • You have side or back pain that gets worse.     Call your doctor if:   • You have a fever.    • You are a woman and you have increased white or yellow discharge from your vagina. • You do not feel better after 2 days of taking antibiotics. • You have questions or concerns about your condition or care. Treatment:  Medicines treat the bacterial infection or decrease pain and burning when you urinate. You may also need medicines to decrease the urge to urinate often. If you have UTIs often (called recurrent UTIs), you may be given antibiotics to take regularly. You will be given directions for when and how to use antibiotics. The goal is to prevent UTIs but not cause antibiotic resistance by using antibiotics too often. Self-care:   • Drink liquids as directed. Liquids can help flush bacteria from your urinary tract. Ask how much liquid to drink each day and which liquids are best for you. You may need to drink more liquids than usual to help flush out the bacteria. Do not drink alcohol, caffeine, and citrus juices. These can irritate your bladder and increase your symptoms. • Apply heat  on your abdomen for 20 to 30 minutes every 2 hours for as many days as directed. Heat helps decrease discomfort and pressure in your bladder. Prevent a UTI:   • Urinate when you feel the urge. Do not hold your urine. Bacteria can grow if urine stays in the bladder too long. It may be helpful to urinate at least every 3 to 4 hours. • Urinate after you have sex. This will help flush away bacteria that can enter your urinary tract during sex. • Wear cotton underwear and clothes that are loose. Tight pants and nylon underwear can trap moisture and cause bacteria to grow. • Drink cranberry juice or take cranberry supplements. These may help prevent UTIs. Your healthcare provider can recommend the right juice or supplement for you. • Women should wipe front to back after urinating or having a bowel movement. This may prevent germs from getting into the urinary tract.  Do not douche or use feminine deodorants. These can change the chemical balance in your vagina. You may also be given vaginal estrogen medicine. This medicine helps prevent recurrent UTIs in women who have gone through menopause or are in shira-menopause. Follow up with your doctor as directed:  Write down your questions so you remember to ask them during your visits. © Copyright [facility] 2022 Information is for End User's use only and may not be sold, redistributed or otherwise used for commercial purposes. The above information is an  only. It is not intended as medical advice for individual conditions or treatments. Talk to your doctor, nurse or pharmacist before following any medical regimen to see if it is safe and effective for you. Chief Complaint     Chief Complaint   Patient presents with   • Possible UTI     Started through the night with frequency and some pain          History of Present Illness       31-year-old female with a past medical history significant for hypertension and recurrent UTI presents with complaints of urinary frequency and dysuria x2 days. She denies fever, chills, abdominal pain, back pain, flank pain, vomiting, diarrhea, or hematuria. Previous history of UTI treated successfully with cephalexin multiple times. Patient reports seeing urology about 2 years ago and not since. Review of Systems   Review of Systems   Constitutional: Negative for chills and fever. HENT: Negative for congestion, ear discharge, ear pain, rhinorrhea, sore throat, trouble swallowing and voice change. Eyes: Negative for discharge. Respiratory: Negative for cough and shortness of breath. Cardiovascular: Negative for chest pain. Gastrointestinal: Negative for abdominal pain, diarrhea, nausea and vomiting. Genitourinary: Positive for difficulty urinating, dysuria and frequency. Negative for flank pain and hematuria. Musculoskeletal: Negative for back pain. Skin: Negative for rash. Current Medications       Current Outpatient Medications:   •  aspirin 81 mg chewable tablet, Chew 81 mg daily, Disp: , Rfl:   •  cephalexin (KEFLEX) 500 mg capsule, Take 1 capsule (500 mg total) by mouth every 12 (twelve) hours for 7 days, Disp: 14 capsule, Rfl: 0  •  co-enzyme Q-10 30 MG capsule, Take 30 mg by mouth 3 (three) times a day, Disp: , Rfl:   •  CRANBERRY PO, Take by mouth, Disp: , Rfl:   •  diazepam (VALIUM) 5 mg tablet, 5 mg every 6 (six) hours as needed , Disp: , Rfl:   •  Diclofenac Sodium (VOLTAREN) 1 %, , Disp: , Rfl:   •  docusate sodium (COLACE) 50 mg capsule, Take 100 mg by mouth 2 (two) times a day , Disp: , Rfl:   •  ELDERBERRY PO, Take by mouth, Disp: , Rfl:   •  magnesium oxide (MAG-OX) 400 mg tablet, Take by mouth, Disp: , Rfl:   •  omega-3-acid ethyl esters (LOVAZA) 1 g capsule, Take 2 g by mouth 2 (two) times a day, Disp: , Rfl:   •  simvastatin (ZOCOR) 10 mg tablet, 10 mg , Disp: , Rfl:   •  VITAMIN D, CHOLECALCIFEROL, PO, Take 2,000 Units by mouth, Disp: , Rfl:   •  Zinc 50 MG TABS, Take by mouth, Disp: , Rfl:   •  estradiol (ESTRACE VAGINAL) 0.1 mg/g vaginal cream, Insert 1 g into the vagina 2 (two) times a week Apply pea size amount to external vagina 2 times per week (Patient not taking: Reported on 5/19/2022), Disp: 42.5 g, Rfl: 1  •  GARLIC PO, Take by mouth, Disp: , Rfl:   •  HYDROcodone-acetaminophen (NORCO) 5-325 mg per tablet, , Disp: , Rfl:   •  lisinopril (ZESTRIL) 40 mg tablet, Take 40 mg by mouth daily , Disp: , Rfl:   •  saccharomyces boulardii (FLORASTOR) 250 mg capsule, Take 250 mg by mouth 2 (two) times a day (Patient not taking: Reported on 8/2/2022), Disp: , Rfl:   •  zinc gluconate 50 mg tablet, Take 50 mg by mouth daily   (Patient not taking: Reported on 5/19/2022), Disp: , Rfl:     Current Allergies     Allergies as of 08/03/2023 - Reviewed 08/03/2023   Allergen Reaction Noted   • Estradiol Other (See Comments) 05/19/2022   • Sulfamethoxazole-trimethoprim Other (See Comments) 07/26/2020   • Ibuprofen Other (See Comments) 10/03/2012            The following portions of the patient's history were reviewed and updated as appropriate: allergies, current medications, past family history, past medical history, past social history, past surgical history and problem list.     Past Medical History:   Diagnosis Date   • Arthritis    • High cholesterol    • Hypertension    • Urinary tract infection        Past Surgical History:   Procedure Laterality Date   • APPENDECTOMY     • CHOLECYSTECTOMY     • HIP SURGERY     • HYSTERECTOMY     • JOINT REPLACEMENT     • KNEE SURGERY         Family History   Problem Relation Age of Onset   • Cancer Mother    • Stroke Father          Medications have been verified. Objective   BP (!) 171/75   Pulse 83   Temp 98.7 °F (37.1 °C)   Resp 16   Ht 5' 2" (1.575 m)   Wt 70.3 kg (155 lb)   SpO2 97%   BMI 28.35 kg/m²   No LMP recorded. Patient has had a hysterectomy. Physical Exam     Physical Exam  Vitals and nursing note reviewed. Constitutional:       General: She is not in acute distress. Appearance: She is not toxic-appearing. HENT:      Head: Normocephalic. Nose: Nose normal.      Mouth/Throat:      Mouth: Mucous membranes are moist.      Pharynx: Oropharynx is clear. Eyes:      Conjunctiva/sclera: Conjunctivae normal.   Cardiovascular:      Rate and Rhythm: Normal rate and regular rhythm. Heart sounds: Normal heart sounds. Pulmonary:      Effort: Pulmonary effort is normal. No respiratory distress. Breath sounds: Normal breath sounds. No stridor. No wheezing, rhonchi or rales. Abdominal:      General: Bowel sounds are normal.      Palpations: Abdomen is soft. Tenderness: There is no abdominal tenderness. There is no right CVA tenderness or left CVA tenderness. Skin:     General: Skin is warm and dry. Neurological:      Mental Status: She is alert and oriented to person, place, and time. Gait: Gait is intact.    Psychiatric:         Mood and Affect: Mood normal.         Behavior: Behavior normal.

## 2023-08-03 NOTE — PATIENT INSTRUCTIONS
Urine culture pending  Take antibiotic as prescribed  Referral placed to Urology  Increase fluid intake   Follow up with PCP in 3-5 days. Proceed to  ER if symptoms worsen. Urinary Tract Infection in Older Adults   AMBULATORY CARE:   A urinary tract infection (UTI)  is caused by bacteria that get inside your urinary tract. Your urinary tract includes your kidneys, ureters, bladder, and urethra. A UTI is more common in your lower urinary tract, which includes your bladder and urethra. Common signs and symptoms include the following:   Fever and chills    Pain or burning when you urinate    Urine that smells bad or looks cloudy, or blood in your urine    Urinating more often or waking from sleep to urinate    Sudden, strong need to urinate    Pain or pressure in your lower abdomen     Leaking urine    Confusion or agitation    Fatigue, shakiness, and weakness    Seek care immediately if:   You become confused or agitated. You fall down. You are urinating very little or not at all. You are vomiting. You have a high fever with shaking chills. You have side or back pain that gets worse. Call your doctor if:   You have a fever. You are a woman and you have increased white or yellow discharge from your vagina. You do not feel better after 2 days of taking antibiotics. You have questions or concerns about your condition or care. Treatment:  Medicines treat the bacterial infection or decrease pain and burning when you urinate. You may also need medicines to decrease the urge to urinate often. If you have UTIs often (called recurrent UTIs), you may be given antibiotics to take regularly. You will be given directions for when and how to use antibiotics. The goal is to prevent UTIs but not cause antibiotic resistance by using antibiotics too often. Self-care:   Drink liquids as directed. Liquids can help flush bacteria from your urinary tract.  Ask how much liquid to drink each day and which liquids are best for you. You may need to drink more liquids than usual to help flush out the bacteria. Do not drink alcohol, caffeine, and citrus juices. These can irritate your bladder and increase your symptoms. Apply heat  on your abdomen for 20 to 30 minutes every 2 hours for as many days as directed. Heat helps decrease discomfort and pressure in your bladder. Prevent a UTI:   Urinate when you feel the urge. Do not hold your urine. Bacteria can grow if urine stays in the bladder too long. It may be helpful to urinate at least every 3 to 4 hours. Urinate after you have sex. This will help flush away bacteria that can enter your urinary tract during sex. Wear cotton underwear and clothes that are loose. Tight pants and nylon underwear can trap moisture and cause bacteria to grow. Drink cranberry juice or take cranberry supplements. These may help prevent UTIs. Your healthcare provider can recommend the right juice or supplement for you. Women should wipe front to back after urinating or having a bowel movement. This may prevent germs from getting into the urinary tract. Do not douche or use feminine deodorants. These can change the chemical balance in your vagina. You may also be given vaginal estrogen medicine. This medicine helps prevent recurrent UTIs in women who have gone through menopause or are in shira-menopause. Follow up with your doctor as directed:  Write down your questions so you remember to ask them during your visits. © Copyright [employer] 2022 Information is for End User's use only and may not be sold, redistributed or otherwise used for commercial purposes. The above information is an  only. It is not intended as medical advice for individual conditions or treatments. Talk to your doctor, nurse or pharmacist before following any medical regimen to see if it is safe and effective for you.

## 2023-08-05 LAB — BACTERIA UR CULT: ABNORMAL

## 2023-09-27 ENCOUNTER — EVALUATION (OUTPATIENT)
Dept: PHYSICAL THERAPY | Facility: CLINIC | Age: 88
End: 2023-09-27
Payer: COMMERCIAL

## 2023-09-27 DIAGNOSIS — M47.816 OSTEOARTHRITIS OF LUMBAR SPINE, UNSPECIFIED SPINAL OSTEOARTHRITIS COMPLICATION STATUS: ICD-10-CM

## 2023-09-27 DIAGNOSIS — M79.604 BILATERAL LEG PAIN: Primary | ICD-10-CM

## 2023-09-27 DIAGNOSIS — M79.605 BILATERAL LEG PAIN: Primary | ICD-10-CM

## 2023-09-27 PROCEDURE — 97110 THERAPEUTIC EXERCISES: CPT

## 2023-09-27 PROCEDURE — 97161 PT EVAL LOW COMPLEX 20 MIN: CPT

## 2023-09-27 PROCEDURE — 97535 SELF CARE MNGMENT TRAINING: CPT

## 2023-09-27 NOTE — PROGRESS NOTES
PT Evaluation     Today's date: 2023  Patient name: Fernandez Lawson  : 1931  MRN: [id number]  Referring provider: Marjorie Lizama DO  Dx:   Encounter Diagnosis     ICD-10-CM    1. Bilateral leg pain  M79.604     M79.605       2. Osteoarthritis of lumbar spine, unspecified spinal osteoarthritis complication status  A38.633           Start Time: 937  Stop Time: 1045  Total time in clinic (min): 68 minutes    Assessment  Assessment details: Patient is a 80 y.o. female with medical diagnosis of bilateral leg pain and OA of lumbar spine. Following a thorough physical therapy evaluation, the patient demonstrates objective impairments as follows: decreased hip and lumbar ROM, impaired flexibility of gastroc, hip flexor and hamstring musculature, core weakness and weakness of BLE musculature. Educated patient regarding anatomical connections between lumbar spine and LE, as well as the effect muscle length and weakness can have on function. Patient verbalized understanding. Provided patient c/ handout c/ HEP. Patient demonstrated each exercise c/ good form and verbalized understanding of expectations c/ HEP. Patient will benefit from skilled physical therapy treatment to address the aforementioned impairments and functional deficits, and accomplish patient goals. Impairments: abnormal or restricted ROM, abnormal movement, activity intolerance, impaired balance, impaired physical strength, lacks appropriate home exercise program and pain with function    Goals  STG (3 weeks):  Lumbar Extension to neutral.  Ankle DF to neutral.  Increase LE strength by 1/2 grade. LTG (8 weeks):  LE strength >=1/2 grade. Pain @ worst 5/10.  75% improvement in b/l LE pain. Patient will be independent with HEP in 8 weeks to allow independent management of condition.      Plan  Plan details: TE, NMR, TA, MT, self-care, and modalities as needed in order to progress through skilled PT focused on ROM, strength, balance, motor control. Patient would benefit from: skilled physical therapy  Planned modality interventions: cryotherapy and thermotherapy: hydrocollator packs  Planned therapy interventions: manual therapy, neuromuscular re-education, patient education, self care, therapeutic activities, therapeutic exercise and home exercise program  Frequency: 2x week  Duration in weeks: 6  Plan of Care beginning date: 2023  Plan of Care expiration date: 2023  Treatment plan discussed with: patient        Subjective Evaluation    History of Present Illness  Mechanism of injury: IE: Patient is a 80 y.o. female presenting with b/l leg pain. PMHx (+) for LBP c/w BLE symptoms,was given injections approx. 4-5 years ago which relieved pain for 3 weeks at a time, but they stopped working. Dealt c/ this pain at home until recently. Over past 6-7 months, pain has increased slightly to a point she wanted to seek care again. Reports pain is located in b/l calves and = in intensity b/l. Pain is nearly all the time, but waxes and wanes depending on the day. Feels better when laying in bed, but otherwise no notable changes c/ activity. Uses heating pad on legs, which relieves pain moderately. Ambulates s/ AD, but uses SPC if pain is excessively high. Patients primary goals for PT are to alleviate pain. Patient Goals  Patient goals for therapy: increased strength, decreased pain, increased motion and return to sport/leisure activities    Pain  Current pain ratin  At best pain ratin  At worst pain ratin          Objective  Observation:     -Gait: slight trunk flexion, wide DANIAL, no AD     -Core Stability: Able to perform PPT, but quickly fatigues c/ activity. TTP: None  Homans (-) b/l    Neuro Screen:  LE Clonus: (-)    Lumbar ROM (degrees):     IE  FLX:  ankles  EXT:   Unable to attain neutral    Repeated Movements Assessment: No change in symptoms c/ repeated standing flexion      Hip ROM: Globally, limited in ROM b/l.  Maintained L hip precautions t/o testing. R Hip IR unable to attain neutral. 10* B ER. Hip FLX <90* BLE. Unable to attain neutral hip extension. LE Mobility:      IE  Hip Flexor:  -5* b/l  Hamstring (R/L): 55*/45*   Gastroc (R/L):  -5*/-10*    LE Strength (MMT Grades, R/L):     IE  Hip FLX (Supine): 4-/4  Hip ABD (Hook-lying): 4-/4   Knee EXT:  4/4-  Knee FLX:   4-/4             Precautions: L UZMA (approx. 12 yrs ago), L TKA (approx. 17 yrs ago)    Daily Treatment Diary:      Initial Evaluation Date: 09/27/23  POC Expiration: 11/8/23  Compliance 09/27/23                     Visit Number 1                    Re-Eval  IE                 [facility]   Foto Captured Y                          Date 9/27       Manuals                                Neuro Re-Ed        PPT nv       PPT c/ March        S/Lying Clams        Hook-lying Clams nv       Hip ADDuction Iso Seated x20       Side-stepping        LAURA        Pallof Press        Bridge nv       Bird-dog        Deadbug                Ther Ex        LTRs x20       SKTC        Gastroc Stretch nv       HS Stretch nv       Hip Flexor Stretch        LAQ        HC        Leg Press        Seated Flexion c/ SB nv       Standing Hand/Heel Rock @ Counter        HR/TR Seated x20       Standing: Ext/Abd        NuStep nv                               Ther Activity        Wall Squat        Lunge        Sit to Stand                        Modalities                        Self Care: Provided patient c/ handout c/ HEP. Patient demonstrated each exercise c/ good form and verbalized understanding of expectations c/ HEP. Access Code: [email or web address]  URL: [email or web address]  Date: 09/27/2023  Prepared by: Brett Castro    Exercises  - Seated Toe Raise  - 1 x daily - 7 x weekly - 1 sets - 15 reps  - Seated Heel Raise  - 1 x daily - 7 x weekly - 1 sets - 15 reps  - Seated Hip Adduction Squeeze with Ball  - 1 x daily - 7 x weekly - 2 sets - 10 reps  - Gastroc Stretch on Wall  - 1 x daily - 7 x weekly - 4 reps - 30 seconds hold

## 2023-09-27 NOTE — LETTER
2023    Torresrobyn DO Urszula  [address] [address]    Patient: Hema Antony   YOB: 1931   Date of Visit: 2023     Encounter Diagnosis     ICD-10-CM    1. Bilateral leg pain  M79.604     M79.605       2. Osteoarthritis of lumbar spine, unspecified spinal osteoarthritis complication status  M30.289           Dear Dr. Patricia West: Thank you for your recent referral of Hema Antony. Please review the attached evaluation summary from Bibi's recent visit. Please verify that you agree with the plan of care by signing the attached order. If you have any questions or concerns, please do not hesitate to call. I sincerely appreciate the opportunity to share in the care of one of your patients and hope to have another opportunity to work with you in the near future. Sincerely,    Donna Mccarthy, PT      Referring Provider:      I certify that I have read the below Plan of Care and certify the need for these services furnished under this plan of treatment while under my care. Jhonny Seaman DO  [address] [address]  Via Fax: [phone]          PT Evaluation     Today's date: 2023  Patient name: Hema Antony  : 1931  MRN: [id number]  Referring provider: Samantha Christie DO  Dx:   Encounter Diagnosis     ICD-10-CM    1. Bilateral leg pain  M79.604     M79.605       2. Osteoarthritis of lumbar spine, unspecified spinal osteoarthritis complication status  N54.546           Start Time: 937  Stop Time: 1045  Total time in clinic (min): 68 minutes    Assessment  Assessment details: Patient is a 80 y.o. female with medical diagnosis of bilateral leg pain and OA of lumbar spine.  Following a thorough physical therapy evaluation, the patient demonstrates objective impairments as follows: decreased hip and lumbar ROM, impaired flexibility of gastroc, hip flexor and hamstring musculature, core weakness and weakness of BLE musculature. Educated patient regarding anatomical connections between lumbar spine and LE, as well as the effect muscle length and weakness can have on function. Patient verbalized understanding. Provided patient c/ handout c/ HEP. Patient demonstrated each exercise c/ good form and verbalized understanding of expectations c/ HEP. Patient will benefit from skilled physical therapy treatment to address the aforementioned impairments and functional deficits, and accomplish patient goals. Impairments: abnormal or restricted ROM, abnormal movement, activity intolerance, impaired balance, impaired physical strength, lacks appropriate home exercise program and pain with function    Goals  STG (3 weeks):  Lumbar Extension to neutral.  Ankle DF to neutral.  Increase LE strength by 1/2 grade. LTG (8 weeks):  LE strength >=1/2 grade. Pain @ worst 5/10.  75% improvement in b/l LE pain. Patient will be independent with HEP in 8 weeks to allow independent management of condition. Plan  Plan details: TE, NMR, TA, MT, self-care, and modalities as needed in order to progress through skilled PT focused on ROM, strength, balance, motor control. Patient would benefit from: skilled physical therapy  Planned modality interventions: cryotherapy and thermotherapy: hydrocollator packs  Planned therapy interventions: manual therapy, neuromuscular re-education, patient education, self care, therapeutic activities, therapeutic exercise and home exercise program  Frequency: 2x week  Duration in weeks: 6  Plan of Care beginning date: 9/27/2023  Plan of Care expiration date: 11/8/2023  Treatment plan discussed with: patient        Subjective Evaluation    History of Present Illness  Mechanism of injury: IE: Patient is a 80 y.o. female presenting with b/l leg pain. PMHx (+) for LBP c/w BLE symptoms,was given injections approx.  4-5 years ago which relieved pain for 3 weeks at a time, but they stopped working. Dealt c/ this pain at home until recently. Over past 6-7 months, pain has increased slightly to a point she wanted to seek care again. Reports pain is located in b/l calves and = in intensity b/l. Pain is nearly all the time, but waxes and wanes depending on the day. Feels better when laying in bed, but otherwise no notable changes c/ activity. Uses heating pad on legs, which relieves pain moderately. Ambulates s/ AD, but uses SPC if pain is excessively high. Patients primary goals for PT are to alleviate pain. Patient Goals  Patient goals for therapy: increased strength, decreased pain, increased motion and return to sport/leisure activities    Pain  Current pain ratin  At best pain ratin  At worst pain ratin          Objective  Observation:     -Gait: slight trunk flexion, wide DANIAL, no AD     -Core Stability: Able to perform PPT, but quickly fatigues c/ activity. TTP: None  Homans (-) b/l    Neuro Screen:  LE Clonus: (-)    Lumbar ROM (degrees):     IE  FLX:  ankles  EXT:   Unable to attain neutral    Repeated Movements Assessment: No change in symptoms c/ repeated standing flexion      Hip ROM: Globally, limited in ROM b/l. Maintained L hip precautions t/o testing. R Hip IR unable to attain neutral. 10* B ER. Hip FLX <90* BLE. Unable to attain neutral hip extension. LE Mobility:      IE  Hip Flexor:  -5* b/l  Hamstring (R/L): 55*/45*   Gastroc (R/L):  -5*/-10*    LE Strength (MMT Grades, R/L):     IE  Hip FLX (Supine): 4-/4  Hip ABD (Hook-lying): 4-/4   Knee EXT:  4/4-  Knee FLX:   4-/4            Precautions: L UZMA (approx. 12 yrs ago), L TKA (approx.  17 yrs ago)    Daily Treatment Diary:      Initial Evaluation Date: 23  POC Expiration: 23  Compliance 23                     Visit Number 1                    Re-Eval  IE                 [facility]   Foto Captured Y                          Date        Manuals                                Neuro Re-Ed        PPT nv       PPT c/ March        S/Lying Clams        Hook-lying Clams nv       Hip ADDuction Iso Seated x20       Side-stepping        LAURA        Pallof Press        Bridge nv       Bird-dog        Deadbug                Ther Ex        LTRs x20       SKTC        Gastroc Stretch nv       HS Stretch nv       Hip Flexor Stretch        LAQ        HC        Leg Press        Seated Flexion c/ SB nv       Standing Hand/Heel Rock @ Counter        HR/TR Seated x20       Standing: Ext/Abd        NuStep nv                               Ther Activity        Wall Squat        Lunge        Sit to Stand                        Modalities                        Self Care: Provided patient c/ handout c/ HEP. Patient demonstrated each exercise c/ good form and verbalized understanding of expectations c/ HEP. Access Code: [email or web address]  URL: [email or web address]  Date: 09/27/2023  Prepared by: Ophelia Salter    Exercises  - Seated Toe Raise  - 1 x daily - 7 x weekly - 1 sets - 15 reps  - Seated Heel Raise  - 1 x daily - 7 x weekly - 1 sets - 15 reps  - Seated Hip Adduction Squeeze with Ball  - 1 x daily - 7 x weekly - 2 sets - 10 reps  - Gastroc Stretch on Wall  - 1 x daily - 7 x weekly - 4 reps - 30 seconds hold

## 2023-10-03 ENCOUNTER — OFFICE VISIT (OUTPATIENT)
Dept: PHYSICAL THERAPY | Facility: CLINIC | Age: 88
End: 2023-10-03
Payer: COMMERCIAL

## 2023-10-03 DIAGNOSIS — M79.604 BILATERAL LEG PAIN: Primary | ICD-10-CM

## 2023-10-03 DIAGNOSIS — M47.816 OSTEOARTHRITIS OF LUMBAR SPINE, UNSPECIFIED SPINAL OSTEOARTHRITIS COMPLICATION STATUS: ICD-10-CM

## 2023-10-03 DIAGNOSIS — M79.605 BILATERAL LEG PAIN: Primary | ICD-10-CM

## 2023-10-03 PROCEDURE — 97112 NEUROMUSCULAR REEDUCATION: CPT

## 2023-10-03 PROCEDURE — 97110 THERAPEUTIC EXERCISES: CPT

## 2023-10-03 PROCEDURE — 97140 MANUAL THERAPY 1/> REGIONS: CPT

## 2023-10-03 NOTE — PROGRESS NOTES
Daily Note     Today's date: 10/3/2023  Patient name: Nba Stevens  : 1931  MRN: [id number]  Referring provider: Lee Ann Robles DO  Dx:   Encounter Diagnosis     ICD-10-CM    1. Bilateral leg pain  M79.604     M79.605       2. Osteoarthritis of lumbar spine, unspecified spinal osteoarthritis complication status  Y89.353           Start Time: 0800  Stop Time: 0850  Total time in clinic (min): 50 minutes    Subjective: Reports yesterday was a bad day c/ her lower leg pain, but today is better. Has been completing stretches at home. Objective: See treatment diary below      Assessment: Tolerated treatment well. Patient demonstrated fatigue post treatment and would benefit from continued PT. Required verbal cues to perform gastroc stretch vs. gastroc exercise. Fatigued quickly c/ sit to stands, c/ difficulty attaining lumbar flexion to correctly utilize glutes. Plan: Continue per plan of care. Precautions: L UZMA (approx. 12 yrs ago), L TKA (approx.  17 yrs ago)    Daily Treatment Diary:      Initial Evaluation Date: 23  POC Expiration: 23  Compliance 09/27/23  10/3                   Visit Number 1 2                   Re-Eval  IE                 MC   Foto Captured Y                          Date 9/27 10/3      Manuals        Hamstring Stretch  KS                      Neuro Re-Ed        TA Isometric c/ SB nv 10"/10      PPT / March        S/Lying Clams        Hook-lying Clams nv BlTB 5"/15      Hip ADDuction Iso Seated x20 nv      Side-stepping        LAURA        Pallof Press        Bridge nv nv      Bird-dog        Deadbug        Sit to Stand  /              Ther Ex        LTRs x20 x20ea      SKTC        Gastroc Stretch nv 30"/4      HS Stretch nv 30"/4      Hip Flexor Stretch        LAQ        HC  BlTB x10ea      Leg Press        Seated Flexion c/ SB: 3-way nv x10ea      Standing Hand/Heel Rock @ Counter        HR/TR Seated x20 Seated x20      Standing: Ext/Abd        NuStep: LE Muscular Endurance nv L1 6'                              Ther Activity        Wall Squat        Lunge                        Modalities                          Access Code: [email or web address]  URL: [email or web address]  Date: 09/27/2023  Prepared by: Donna Mccarthy    Exercises  - Seated Toe Raise  - 1 x daily - 7 x weekly - 1 sets - 15 reps  - Seated Heel Raise  - 1 x daily - 7 x weekly - 1 sets - 15 reps  - Seated Hip Adduction Squeeze with Ball  - 1 x daily - 7 x weekly - 2 sets - 10 reps  - Gastroc Stretch on Wall  - 1 x daily - 7 x weekly - 4 reps - 30 seconds hold

## 2023-10-05 ENCOUNTER — OFFICE VISIT (OUTPATIENT)
Dept: PHYSICAL THERAPY | Facility: CLINIC | Age: 88
End: 2023-10-05
Payer: COMMERCIAL

## 2023-10-05 DIAGNOSIS — M79.605 BILATERAL LEG PAIN: Primary | ICD-10-CM

## 2023-10-05 DIAGNOSIS — M79.604 BILATERAL LEG PAIN: Primary | ICD-10-CM

## 2023-10-05 DIAGNOSIS — M47.816 OSTEOARTHRITIS OF LUMBAR SPINE, UNSPECIFIED SPINAL OSTEOARTHRITIS COMPLICATION STATUS: ICD-10-CM

## 2023-10-05 PROCEDURE — 97112 NEUROMUSCULAR REEDUCATION: CPT

## 2023-10-05 PROCEDURE — 97110 THERAPEUTIC EXERCISES: CPT

## 2023-10-05 NOTE — PROGRESS NOTES
Daily Note     Today's date: 10/5/2023  Patient name: Alvin Meza  : 1931  MRN: [id number]  Referring provider: Deangelo Ledesma DO  Dx:   Encounter Diagnosis     ICD-10-CM    1. Bilateral leg pain  M79.604     M79.605       2. Osteoarthritis of lumbar spine, unspecified spinal osteoarthritis complication status  W81.787           Start Time: 1505  Stop Time: 1600  Total time in clinic (min): 55 minutes    Subjective: Reports no significant changes in symptoms. Had a busy day and feels fatigued. Objective: See treatment diary below      Assessment: Tolerated treatment well. Patient demonstrated fatigue post treatment, exhibited good technique with therapeutic exercises and would benefit from continued PT. Difficulty lifting hips during glute bridge requiring repetitions to be split up into several sets. Plan: Continue per plan of care. Precautions: L UZMA (approx. 12 yrs ago), L TKA (approx.  17 yrs ago)    Daily Treatment Diary:      Initial Evaluation Date: 23  POC Expiration: 23  Compliance 09/27/23  10/3  10/5                 Visit Number 1 2  3                 Re-Eval  IE                 MC   Foto Captured Y                          Date 9/27 10/3 10/5     Manuals        Hamstring Stretch  KS                      Neuro Re-Ed        TA Isometric c/ SB nv 10"/10 10"/10     PPT / March        S/Lying Clams        Hook-lying Clams nv BlTB 5"/15 BlTB 5"/15     Hip ADDuction Iso Seated x20 nv Seated x20     Side-stepping        LAURA        Pallof Press        Bridge nv nv 3/     Bird-dog        Deadbug        Sit to Stand  /             Ther Ex        LTRs x20 x20ea x20     SKTC        Gastroc Stretch nv 30"/4 30"/4     HS Stretch nv 30"/4 30"/4     Hip Flexor Stretch        LAQ   2# x10ea     HC  BlTB x10ea BlTB x10ea     Leg Press        Seated Flexion c/ SB: 3-way nv x10ea x10ea     Standing Hand/Heel Rock @ Counter        HR/TR Seated x20 Seated x20 nv     Standing: Ext/Abd        NuStep: LE Muscular Endurance nv L1 6' L1 6'                             Ther Activity        Wall Squat        Lunge                        Modalities                          Access Code: [email or web address]  URL: [email or web address]  Date: 09/27/2023  Prepared by: Priscila Mathis    Exercises  - Seated Toe Raise  - 1 x daily - 7 x weekly - 1 sets - 15 reps  - Seated Heel Raise  - 1 x daily - 7 x weekly - 1 sets - 15 reps  - Seated Hip Adduction Squeeze with Ball  - 1 x daily - 7 x weekly - 2 sets - 10 reps  - Gastroc Stretch on Wall  - 1 x daily - 7 x weekly - 4 reps - 30 seconds hold

## 2023-10-09 ENCOUNTER — APPOINTMENT (OUTPATIENT)
Dept: PHYSICAL THERAPY | Facility: CLINIC | Age: 88
End: 2023-10-09
Payer: COMMERCIAL

## 2023-10-10 ENCOUNTER — OFFICE VISIT (OUTPATIENT)
Dept: PHYSICAL THERAPY | Facility: CLINIC | Age: 88
End: 2023-10-10
Payer: COMMERCIAL

## 2023-10-10 DIAGNOSIS — M79.605 BILATERAL LEG PAIN: Primary | ICD-10-CM

## 2023-10-10 DIAGNOSIS — M79.604 BILATERAL LEG PAIN: Primary | ICD-10-CM

## 2023-10-10 DIAGNOSIS — M47.816 OSTEOARTHRITIS OF LUMBAR SPINE, UNSPECIFIED SPINAL OSTEOARTHRITIS COMPLICATION STATUS: ICD-10-CM

## 2023-10-10 PROCEDURE — 97140 MANUAL THERAPY 1/> REGIONS: CPT

## 2023-10-10 PROCEDURE — 97110 THERAPEUTIC EXERCISES: CPT

## 2023-10-10 PROCEDURE — 97112 NEUROMUSCULAR REEDUCATION: CPT

## 2023-10-10 NOTE — PROGRESS NOTES
Daily Note     Today's date: 10/10/2023  Patient name: Ken Roca  : 1931  MRN: [id number]  Referring provider: Nicolle Wellington DO  Dx:   Encounter Diagnosis     ICD-10-CM    1. Bilateral leg pain  M79.604     M79.605       2. Osteoarthritis of lumbar spine, unspecified spinal osteoarthritis complication status  T89.483           Start Time: 0800  Stop Time: 0855  Total time in clinic (min): 55 minutes    Subjective: Reports her LE pain intensity has been a little better. Objective: See treatment diary below      Assessment: Tolerated treatment well. Patient demonstrated fatigue post treatment, exhibited good technique with therapeutic exercises and would benefit from continued PT. Progressed to sets of 6 reps of sit to stands prior to fatigue and need for short rest break this visit. Plan: Continue per plan of care. Precautions: L UZMA (approx. 12 yrs ago), L TKA (approx.  17 yrs ago)    Daily Treatment Diary:      Initial Evaluation Date: 23  POC Expiration: 23  Compliance 09/27/23  10/3  10/5  10/10               Visit Number 1 2  3  4               Re-Eval  IE                 MC   Foto Captured Y     Y                     Date 9/27 10/3 10/5 10/10    Manuals        Hamstring Stretch  KS  KS                    Neuro Re-Ed        TA Isometric c/ SB nv 10"/10 10"/10 5"/20    PPT / March        S/Lying Clams        Hook-lying Clams nv BlTB 5"/15 BlTB 5"/15 BlTB 5"/20    Hip ADDuction Iso Seated x20 nv Seated x20 Hook-lying x20    Side-stepping        LARUA        Pallof Press        Bridge nv nv 3/5 3/5    Bird-dog        Deadbug        Sit to Stand   2/6            Ther Ex        LTRs x20 x20ea x20 x20    SKTC        Gastroc Stretch nv 30"/4 30"/4 30"/4    HS Stretch nv 30"/4 30"/4 30"/4    Hip Flexor Stretch        LAQ   2# x10ea 3# x10ea    HC  BlTB x10ea BlTB x10ea BlTB x10ea    Leg Press        Seated Flexion c/ SB: 3-way nv x10ea x10ea x10ea    Standing Hand/Heel Rock @ Counter        HR/TR Seated x20 Seated x20 nv nv    Standing: Ext/Abd        NuStep: LE Muscular Endurance nv L1 6' L1 6' L1 6'                            Ther Activity        Wall Squat        Lunge                        Modalities                          Access Code: [email or web address]  URL: [email or web address]  Date: 09/27/2023  Prepared by: Marsha Rhodes    Exercises  - Seated Toe Raise  - 1 x daily - 7 x weekly - 1 sets - 15 reps  - Seated Heel Raise  - 1 x daily - 7 x weekly - 1 sets - 15 reps  - Seated Hip Adduction Squeeze with Ball  - 1 x daily - 7 x weekly - 2 sets - 10 reps  - Gastroc Stretch on Wall  - 1 x daily - 7 x weekly - 4 reps - 30 seconds hold

## 2023-10-12 ENCOUNTER — OFFICE VISIT (OUTPATIENT)
Dept: PHYSICAL THERAPY | Facility: CLINIC | Age: 88
End: 2023-10-12
Payer: COMMERCIAL

## 2023-10-12 DIAGNOSIS — M47.816 OSTEOARTHRITIS OF LUMBAR SPINE, UNSPECIFIED SPINAL OSTEOARTHRITIS COMPLICATION STATUS: ICD-10-CM

## 2023-10-12 DIAGNOSIS — M79.605 BILATERAL LEG PAIN: Primary | ICD-10-CM

## 2023-10-12 DIAGNOSIS — M79.604 BILATERAL LEG PAIN: Primary | ICD-10-CM

## 2023-10-12 PROCEDURE — 97110 THERAPEUTIC EXERCISES: CPT

## 2023-10-12 PROCEDURE — 97112 NEUROMUSCULAR REEDUCATION: CPT

## 2023-10-12 PROCEDURE — 97140 MANUAL THERAPY 1/> REGIONS: CPT

## 2023-10-12 NOTE — PROGRESS NOTES
Daily Note     Today's date: 10/12/2023  Patient name: Russel Santiago  : 1931  MRN: [id number]  Referring provider: Jessie Osuna DO  Dx:   Encounter Diagnosis     ICD-10-CM    1. Bilateral leg pain  M79.604     M79.605       2. Osteoarthritis of lumbar spine, unspecified spinal osteoarthritis complication status  U07.552           Start Time: 0800  Stop Time: 0850  Total time in clinic (min): 50 minutes    Subjective: Patient reports continued improvement in pain intensity since beginning PT. Frequency remains the same at this time. Objective: See treatment diary below      Assessment: Tolerated treatment well. Patient demonstrated fatigue post treatment, exhibited good technique with therapeutic exercises, and would benefit from continued PT. Left hamstring tighter than right. Progressing in strength as evidenced by ability to perform greater repetitions of sit to stands prior to short rest break. Plan: Continue per plan of care. Precautions: L UZMA (approx. 12 yrs ago), L TKA (approx.  17 yrs ago)    Daily Treatment Diary:      Initial Evaluation Date: 23  POC Expiration: 23  Compliance 09/27/23  10/3  10/5  10/10  10/12             Visit Number 1 2  3  4  5             Re-Eval  IE                 MC   Foto Captured Y     Y                     Date 9/27 10/3 10/5 10/10 10/12   Manuals        Hamstring Stretch  KS  KS KS   SKTC     KS <90* on L           Neuro Re-Ed        TA Isometric c/ SB nv 10"/10 10"/10 5"/20 5"/20   PPT / March        S/Lying Clams        Hook-lying Clams nv BlTB 5"/15 BlTB 5"/15 BlTB 5"/20 BlTB 5"/20   Hip ADDuction Iso Seated x20 nv Seated x20 Hook-lying x20 Hklyg x20   Side-stepping        LAURA        Pallof Press        Bridge nv nv 3/5 3/5 3/5   Bird-dog        Deadbug        Sit to Stand   2/6 1/10,             Ther Ex        LTRs x20 x20ea x20 x20 x20   SKTC        Gastroc Stretch nv 30"/4 30"/4 30"/4 30"/4   HS Stretch nv 30"/4 30"/4 30"/4 30"/4   Hip Flexor Stretch        LAQ   2# x10ea 3# x10ea 3# 2/10ea   HC  BlTB x10ea BlTB x10ea BlTB x10ea BlTB 2/10ea   Leg Press        Seated Flexion c/ SB: 3-way nv x10ea x10ea x10ea x10ea   Standing Hand/Heel Rock @ Counter        HR/TR Seated x20 Seated x20 nv nv nv   Standing: Ext/Abd        NuStep: LE Muscular Endurance nv L1 6' L1 6' L1 6' L1 6'                           Ther Activity        Wall Squat        Lunge                        Modalities                          Access Code: [email or web address]  URL: [email or web address]  Date: 09/27/2023  Prepared by: Bernardino Guerra    Exercises  - Seated Toe Raise  - 1 x daily - 7 x weekly - 1 sets - 15 reps  - Seated Heel Raise  - 1 x daily - 7 x weekly - 1 sets - 15 reps  - Seated Hip Adduction Squeeze with Ball  - 1 x daily - 7 x weekly - 2 sets - 10 reps  - Gastroc Stretch on Wall  - 1 x daily - 7 x weekly - 4 reps - 30 seconds hold

## 2023-10-13 ENCOUNTER — OFFICE VISIT (OUTPATIENT)
Dept: URGENT CARE | Facility: CLINIC | Age: 88
End: 2023-10-13
Payer: COMMERCIAL

## 2023-10-13 VITALS
HEART RATE: 84 BPM | HEIGHT: 62 IN | BODY MASS INDEX: 28.52 KG/M2 | WEIGHT: 155 LBS | TEMPERATURE: 97.3 F | DIASTOLIC BLOOD PRESSURE: 82 MMHG | RESPIRATION RATE: 18 BRPM | SYSTOLIC BLOOD PRESSURE: 174 MMHG | OXYGEN SATURATION: 97 %

## 2023-10-13 DIAGNOSIS — R10.2 VAGINAL PAIN: Primary | ICD-10-CM

## 2023-10-13 LAB
SL AMB  POCT GLUCOSE, UA: ABNORMAL
SL AMB LEUKOCYTE ESTERASE,UA: ABNORMAL
SL AMB POCT BILIRUBIN,UA: ABNORMAL
SL AMB POCT BLOOD,UA: ABNORMAL
SL AMB POCT CLARITY,UA: CLEAR
SL AMB POCT COLOR,UA: YELLOW
SL AMB POCT KETONES,UA: ABNORMAL
SL AMB POCT NITRITE,UA: ABNORMAL
SL AMB POCT PH,UA: 7
SL AMB POCT SPECIFIC GRAVITY,UA: 1.01
SL AMB POCT URINE PROTEIN: ABNORMAL
SL AMB POCT UROBILINOGEN: ABNORMAL

## 2023-10-13 PROCEDURE — 99213 OFFICE O/P EST LOW 20 MIN: CPT | Performed by: PHYSICIAN ASSISTANT

## 2023-10-13 PROCEDURE — 81002 URINALYSIS NONAUTO W/O SCOPE: CPT | Performed by: PHYSICIAN ASSISTANT

## 2023-10-13 PROCEDURE — 87086 URINE CULTURE/COLONY COUNT: CPT | Performed by: PHYSICIAN ASSISTANT

## 2023-10-13 RX ORDER — FLUCONAZOLE 150 MG/1
150 TABLET ORAL ONCE
Qty: 1 TABLET | Refills: 0 | Status: SHIPPED | OUTPATIENT
Start: 2023-10-13 | End: 2023-10-13

## 2023-10-13 NOTE — PATIENT INSTRUCTIONS
Urine dip nonsuggestive of UTI, will send urine for culture and follow-up if needed. Discussed symptoms may be candidal will treat with Diflucan, discussed if there is no change or improvement in symptoms over the next 1 to 2 days to follow-up with PCP/gynecologist.  All patient's questions answered and is agreeable with this plan.

## 2023-10-13 NOTE — PROGRESS NOTES
[facility]        NAME: Yobany Gamez is a 80 y.o. female  : 1931    MRN: [id number]  DATE: 2023  TIME: 5:12 PM    Assessment and Plan   Vaginal pain [R10.2]  1. Vaginal pain  POCT urine dip    Urine culture    fluconazole (DIFLUCAN) 150 mg tablet            Patient Instructions     Patient Instructions   Urine dip nonsuggestive of UTI, will send urine for culture and follow-up if needed. Discussed symptoms may be candidal will treat with Diflucan, discussed if there is no change or improvement in symptoms over the next 1 to 2 days to follow-up with PCP/gynecologist.  All patient's questions answered and is agreeable with this plan. Follow up with PCP in 3-5 days. Proceed to  ER if symptoms worsen. Chief Complaint     Chief Complaint   Patient presents with    Possible UTI     Vaginal burning for about 1 week. History of Present Illness       Patient is a 80-year-old female presenting today with vaginal irritation x1 week. Patient notes over the last few days she has been experiencing some burning and discomfort around her vaginal area, notes some redness to the area, has tried Monistat cream but notes no change or improvement of her symptoms. Notes history of UTIs but not with a presentation consistent with this. Denies vaginal discharge or drainage. Denies fever, flank pain, hematuria. Review of Systems   Review of Systems   Constitutional:  Negative for chills and fever. HENT:  Negative for ear pain and sore throat. Eyes:  Negative for pain and visual disturbance. Respiratory:  Negative for cough and shortness of breath. Cardiovascular:  Negative for chest pain and palpitations. Gastrointestinal:  Negative for abdominal pain and vomiting. Genitourinary:  Positive for vaginal pain. Negative for dysuria, hematuria, vaginal bleeding and vaginal discharge. Musculoskeletal:  Negative for arthralgias and back pain.    Skin:  Negative for color change and rash. Neurological:  Negative for seizures and syncope. All other systems reviewed and are negative.         Current Medications       Current Outpatient Medications:     aspirin 81 mg chewable tablet, Chew 81 mg daily, Disp: , Rfl:     co-enzyme Q-10 30 MG capsule, Take 30 mg by mouth 3 (three) times a day, Disp: , Rfl:     CRANBERRY PO, Take by mouth, Disp: , Rfl:     diazepam (VALIUM) 5 mg tablet, 5 mg every 6 (six) hours as needed , Disp: , Rfl:     Diclofenac Sodium (VOLTAREN) 1 %, , Disp: , Rfl:     docusate sodium (COLACE) 50 mg capsule, Take 100 mg by mouth 2 (two) times a day , Disp: , Rfl:     ELDERBERRY PO, Take by mouth, Disp: , Rfl:     fluconazole (DIFLUCAN) 150 mg tablet, Take 1 tablet (150 mg total) by mouth once for 1 dose, Disp: 1 tablet, Rfl: 0    GARLIC PO, Take by mouth, Disp: , Rfl:     magnesium oxide (MAG-OX) 400 mg tablet, Take by mouth, Disp: , Rfl:     omega-3-acid ethyl esters (LOVAZA) 1 g capsule, Take 2 g by mouth 2 (two) times a day, Disp: , Rfl:     simvastatin (ZOCOR) 10 mg tablet, 10 mg , Disp: , Rfl:     VITAMIN D, CHOLECALCIFEROL, PO, Take 2,000 Units by mouth, Disp: , Rfl:     Zinc 50 MG TABS, Take by mouth, Disp: , Rfl:     estradiol (ESTRACE VAGINAL) 0.1 mg/g vaginal cream, Insert 1 g into the vagina 2 (two) times a week Apply pea size amount to external vagina 2 times per week (Patient not taking: Reported on 5/19/2022), Disp: 42.5 g, Rfl: 1    HYDROcodone-acetaminophen (NORCO) 5-325 mg per tablet, , Disp: , Rfl:     lisinopril (ZESTRIL) 40 mg tablet, Take 40 mg by mouth daily , Disp: , Rfl:     saccharomyces boulardii (FLORASTOR) 250 mg capsule, Take 250 mg by mouth 2 (two) times a day (Patient not taking: Reported on 8/2/2022), Disp: , Rfl:     zinc gluconate 50 mg tablet, Take 50 mg by mouth daily   (Patient not taking: Reported on 5/19/2022), Disp: , Rfl:     Current Allergies     Allergies as of 10/13/2023 - Reviewed 10/13/2023   Allergen Reaction Noted Estradiol Other (See Comments) 05/19/2022    Sulfamethoxazole-trimethoprim Other (See Comments) 07/26/2020    Ibuprofen Other (See Comments) 10/03/2012            The following portions of the patient's history were reviewed and updated as appropriate: allergies, current medications, past family history, past medical history, past social history, past surgical history and problem list.     Past Medical History:   Diagnosis Date    Arthritis     High cholesterol     Hypertension     Urinary tract infection        Past Surgical History:   Procedure Laterality Date    APPENDECTOMY      CHOLECYSTECTOMY      HIP SURGERY      HYSTERECTOMY      JOINT REPLACEMENT      KNEE SURGERY         Family History   Problem Relation Age of Onset    Cancer Mother     Stroke Father          Medications have been verified. Objective   BP (!) 174/82   Pulse 84   Temp (!) 97.3 °F (36.3 °C)   Resp 18   Ht 5' 2" (1.575 m)   Wt 70.3 kg (155 lb)   SpO2 97%   BMI 28.35 kg/m²        Physical Exam     Physical Exam  Vitals and nursing note reviewed. Constitutional:       General: She is not in acute distress. Appearance: Normal appearance. She is not ill-appearing. HENT:      Head: Normocephalic. Cardiovascular:      Rate and Rhythm: Normal rate and regular rhythm. Pulses: Normal pulses. Heart sounds: Normal heart sounds. Pulmonary:      Effort: Pulmonary effort is normal.      Breath sounds: Normal breath sounds. Abdominal:      General: Abdomen is flat. Bowel sounds are normal.      Palpations: Abdomen is soft. Tenderness: There is no abdominal tenderness. There is no right CVA tenderness, left CVA tenderness, guarding or rebound. Skin:     General: Skin is warm. Neurological:      Mental Status: She is alert.

## 2023-10-15 LAB — BACTERIA UR CULT: NORMAL

## 2023-10-16 ENCOUNTER — OFFICE VISIT (OUTPATIENT)
Dept: PHYSICAL THERAPY | Facility: CLINIC | Age: 88
End: 2023-10-16
Payer: COMMERCIAL

## 2023-10-16 DIAGNOSIS — M79.605 BILATERAL LEG PAIN: Primary | ICD-10-CM

## 2023-10-16 DIAGNOSIS — M79.604 BILATERAL LEG PAIN: Primary | ICD-10-CM

## 2023-10-16 DIAGNOSIS — M47.816 OSTEOARTHRITIS OF LUMBAR SPINE, UNSPECIFIED SPINAL OSTEOARTHRITIS COMPLICATION STATUS: ICD-10-CM

## 2023-10-16 PROCEDURE — 97110 THERAPEUTIC EXERCISES: CPT

## 2023-10-16 PROCEDURE — 97140 MANUAL THERAPY 1/> REGIONS: CPT

## 2023-10-16 PROCEDURE — 97112 NEUROMUSCULAR REEDUCATION: CPT

## 2023-10-16 NOTE — PROGRESS NOTES
Daily Note     Today's date: 10/16/2023  Patient name: Anna Chery  : 1931  MRN: [id number]  Referring provider: Jordan Jean DO  Dx:   Encounter Diagnosis     ICD-10-CM    1. Bilateral leg pain  M79.604     M79.605       2. Osteoarthritis of lumbar spine, unspecified spinal osteoarthritis complication status  L28.889           Start Time: 08  Stop Time: 920  Total time in clinic (min): 50 minutes    Subjective: Continues to report small progress in LE pain. Objective: See treatment diary below      Assessment: Tolerated treatment well. Patient demonstrated fatigue post treatment, exhibited good technique with therapeutic exercises, and would benefit from continued PT. LE muscles fatigued c/ addition of resistance c/ LAQ. Plan: Continue per plan of care. Precautions: L UZMA (approx. 12 yrs ago), L TKA (approx.  17 yrs ago)    Daily Treatment Diary:      Initial Evaluation Date: 23  POC Expiration: 23  Compliance 09/27/23  10/3  10/5  10/10  10/12  10/16           Visit Number 1 2  3  4  5  6           Re-Eval  IE                    Foto Captured Y     Y                     Date 10/16 10/3 10/5 10/10 10/12   Manuals        Hamstring Stretch KS KS  KS KS   SKTC KS <90* BLE    KS <90* on L           Neuro Re-Ed        TA Isometric c/ SB 5"/20 10"/10 10"/10 5"/20 5"/20   PPT / March        S/Lying Clams        Hook-lying Clams BlTB 5"/20 BlTB 5"/15 BlTB 5"/15 BlTB 5"/20 BlTB 5"/20   Hip ADDuction Iso Hklg x20 nv Seated x20 Hook-lying x20 Hklyg x20   Side-stepping        LAURA        Pallof Press        Bridge 3/5 nv 3/5 3/5 3/5   Bird-dog        Deadbug        Sit to Stand 1/10 1/5 2/5 2/6 1/10,             Ther Ex        LTRs x20 x20ea x20 x20 x20   SKTC        Gastroc Stretch HEP 30"/4 30"/4 30"/4 30"/4   HS Stretch Manual 30"/4 30"/4 30"/4 30"/4   Hip Flexor Stretch        LAQ 4# 2/10ea  2# x10ea 3# x10ea 3# 2/10ea   HC BlTB 2/10ea BlTB x10ea BlTB x10ea BlTB x10ea BlTB 2/10ea   Leg Press        Seated Flexion c/ SB: 3-way x10ea x10ea x10ea x10ea x10ea   Standing Hand/Heel Rock @ Counter        HR/TR x20 Seated x20 nv nv nv   Standing: Ext/Abd        NuStep: LE Muscular Endurance L1 6' L1 6' L1 6' L1 6' L1 6'                           Ther Activity        Wall Squat        Lunge                        Modalities                          Access Code: [email or web address]  URL: [email or web address]  Date: 09/27/2023  Prepared by: Brent Mathis    Exercises  - Seated Toe Raise  - 1 x daily - 7 x weekly - 1 sets - 15 reps  - Seated Heel Raise  - 1 x daily - 7 x weekly - 1 sets - 15 reps  - Seated Hip Adduction Squeeze with Ball  - 1 x daily - 7 x weekly - 2 sets - 10 reps  - Gastroc Stretch on Wall  - 1 x daily - 7 x weekly - 4 reps - 30 seconds hold

## 2023-10-24 ENCOUNTER — OFFICE VISIT (OUTPATIENT)
Dept: PHYSICAL THERAPY | Facility: CLINIC | Age: 88
End: 2023-10-24
Payer: COMMERCIAL

## 2023-10-24 DIAGNOSIS — M47.816 OSTEOARTHRITIS OF LUMBAR SPINE, UNSPECIFIED SPINAL OSTEOARTHRITIS COMPLICATION STATUS: ICD-10-CM

## 2023-10-24 DIAGNOSIS — M79.604 BILATERAL LEG PAIN: Primary | ICD-10-CM

## 2023-10-24 DIAGNOSIS — M79.605 BILATERAL LEG PAIN: Primary | ICD-10-CM

## 2023-10-24 PROCEDURE — 97110 THERAPEUTIC EXERCISES: CPT

## 2023-10-24 PROCEDURE — 97140 MANUAL THERAPY 1/> REGIONS: CPT

## 2023-10-24 PROCEDURE — 97112 NEUROMUSCULAR REEDUCATION: CPT

## 2023-10-24 NOTE — PROGRESS NOTES
Daily Note     Today's date: 10/24/2023  Patient name: Wilner Jaimes  : 1931  MRN: [id number]  Referring provider: Jing Leblanc DO  Dx:   Encounter Diagnosis     ICD-10-CM    1. Bilateral leg pain  M79.604     M79.605       2. Osteoarthritis of lumbar spine, unspecified spinal osteoarthritis complication status  T22.875                      Subjective: Reports she feels 70-80% improvement in her leg pain. Though varies day to day. Objective: See treatment diary below      Assessment: Tolerated treatment well. Patient demonstrated fatigue post treatment, exhibited good technique with therapeutic exercises, and would benefit from continued PT. Glute muscle fatigue quickly c/ bridges requires less reps per set, but able to increase amount of sets this visit. Plan: Continue per plan of care. Precautions: L UZMA (approx. 12 yrs ago), L TKA (approx.  17 yrs ago)    Daily Treatment Diary:      Initial Evaluation Date: 23  POC Expiration: 23  Compliance 09/27/23  10/3  10/5  10/10  10/12  10/16  10/24         Visit Number 1 2  3  4  5  6  7         Re-Eval  IE                 [facility]   Foto Captured Y     Y                     Date 10/16 10/24 10/5 10/10 10/12   Manuals        Hamstring Stretch KS KS  KS KS   SKTC KS <90* BLE KS <90* BLE   KS <90* on L           Neuro Re-Ed        TA Isometric c/ SB 5"/20 10"/10 10"/10 5"/20 5"/20   PPT / March        S/Lying Clams        Hook-lying Clams BlTB 5"/20 BlTB 5"/15 BlTB 5"/15 BlTB 5"/20 BlTB 5"/20   Hip ADDuction Iso Hklg x20 Hklg x20 Seated x20 Hook-lying x20 Hklyg x20   Side-stepping        LAURA        Pallof Press        Bridge 3/5 4/5 3/5 3/5 3/5   Bird-dog        Deadbug        Sit to Stand 1/10 1/10 2/5 2/6 1/10,             Ther Ex        LTRs x20 x20ea x20 x20 x20   SKTC        Gastroc Stretch HEP  30"/4 30"/4 30"/4   HS Stretch Manual  30"/4 30"/4 30"/4   Hip Flexor Stretch        LAQ 4# 2/10ea 4# 210ea 2# Melodie Ball 3# Melodie Ball 3# 2/10ea HC BlTB 2/10ea BlTB 2/10ea BlTB x10ea BlTB x10ea BlTB 2/10ea   Leg Press        Seated Flexion c/ SB: 3-way x10ea x10ea x10ea x10ea x10ea   Standing Hand/Heel Rock @ Counter        HR/TR x20 nv nv nv nv   Standing: Ext/Abd        NuStep: LE Muscular Endurance L1 6' L1 6' L1 6' L1 6' L1 6'                           Ther Activity        Wall Squat        Lunge                        Modalities                          Access Code: [email or web address]  URL: [email or web address]  Date: 09/27/2023  Prepared by: Manuel Garcia    Exercises  - Seated Toe Raise  - 1 x daily - 7 x weekly - 1 sets - 15 reps  - Seated Heel Raise  - 1 x daily - 7 x weekly - 1 sets - 15 reps  - Seated Hip Adduction Squeeze with Ball  - 1 x daily - 7 x weekly - 2 sets - 10 reps  - Gastroc Stretch on Wall  - 1 x daily - 7 x weekly - 4 reps - 30 seconds hold

## 2023-10-31 ENCOUNTER — EVALUATION (OUTPATIENT)
Dept: PHYSICAL THERAPY | Facility: CLINIC | Age: 88
End: 2023-10-31
Payer: COMMERCIAL

## 2023-10-31 DIAGNOSIS — M47.816 OSTEOARTHRITIS OF LUMBAR SPINE, UNSPECIFIED SPINAL OSTEOARTHRITIS COMPLICATION STATUS: ICD-10-CM

## 2023-10-31 DIAGNOSIS — M79.604 BILATERAL LEG PAIN: Primary | ICD-10-CM

## 2023-10-31 DIAGNOSIS — M79.605 BILATERAL LEG PAIN: Primary | ICD-10-CM

## 2023-10-31 PROCEDURE — 97110 THERAPEUTIC EXERCISES: CPT

## 2023-10-31 PROCEDURE — 97112 NEUROMUSCULAR REEDUCATION: CPT

## 2023-10-31 PROCEDURE — 97140 MANUAL THERAPY 1/> REGIONS: CPT

## 2023-10-31 NOTE — LETTER
2023    Jony Patterson DO  [address]    Patient: Robyn Cavanaugh   YOB: 1931   Date of Visit: 10/31/2023     Encounter Diagnosis     ICD-10-CM    1. Bilateral leg pain  M79.604     M79.605       2. Osteoarthritis of lumbar spine, unspecified spinal osteoarthritis complication status  F69.498           Dear Dr. Mark Vora: Thank you for your recent referral of Robyn Cavanaugh. Please review the attached evaluation summary from Bibi's recent visit. Please verify that you agree with the plan of care by signing the attached order. If you have any questions or concerns, please do not hesitate to call. I sincerely appreciate the opportunity to share in the care of one of your patients and hope to have another opportunity to work with you in the near future. Sincerely,    Marsha Rhodes, PT      Referring Provider:      I certify that I have read the below Plan of Care and certify the need for these services furnished under this plan of treatment while under my care. Jony Patterson DO  [address] [address]  Via Fax: [phone]          PT Re-Evaluation     Today's date: 10/31/2023  Patient name: Robyn Cavanaugh  : 1931  MRN: [id number]  Referring provider: Kelton Isidro DO  Dx:   Encounter Diagnosis     ICD-10-CM    1. Bilateral leg pain  M79.604     M79.605       2. Osteoarthritis of lumbar spine, unspecified spinal osteoarthritis complication status  T40.769             Start Time: 0800  Stop Time: 0850  Total time in clinic (min): 50 minutes    Assessment  Assessment details: Patient is a 80 y.o. female with medical diagnosis of bilateral leg pain and OA of lumbar spine. Patient continues to demonstrate progress in pain and function as evidenced by improved LE strength and ROM.  Functionally, notes subjective improvement in pain intensity during all activities. Continues to be limited in overall hip and lumbar flexibility as well as LE strength, but both are improving. She is progressing towards independence c/ HEP, but continues to require verbal cues prn. Patient's LTGs are progressing, but not fully met at this time. Patient will continue to benefit from skilled PT interventions to address remaining impairments and functional limitations, specific focus on gradual transition to independent HEP. Impairments: abnormal or restricted ROM, abnormal movement, activity intolerance, impaired balance, impaired physical strength, lacks appropriate home exercise program and pain with function    Goals  STG (3 weeks): Progressing. Lumbar Extension to neutral.   Ankle DF to neutral.  Increase LE strength by 1/2 grade. Partially met. LTG (8 weeks): Progressing. LE strength >=1/2 grade. Pain @ worst 5/10.  75% improvement in b/l LE pain. Partially met. Patient will be independent with HEP in 8 weeks to allow independent management of condition. Plan  Plan details: TE, NMR, TA, MT, self-care, and modalities as needed in order to progress through skilled PT focused on ROM, strength, balance, motor control. Patient would benefit from: skilled physical therapy  Planned modality interventions: cryotherapy and thermotherapy: hydrocollator packs  Planned therapy interventions: manual therapy, neuromuscular re-education, patient education, self care, therapeutic activities, therapeutic exercise and home exercise program  Frequency: 2x week  Duration in weeks: 6  Plan of Care beginning date: 10/31/2023  Plan of Care expiration date: 12/12/2023  Treatment plan discussed with: patient      Subjective Evaluation    History of Present Illness  Mechanism of injury: IE: Patient is a 80 y.o. female presenting with b/l leg pain. PMHx (+) for LBP c/w BLE symptoms,was given injections approx. 4-5 years ago which relieved pain for 3 weeks at a time, but they stopped working. Dealt c/ this pain at home until recently. Over past 6-7 months, pain has increased slightly to a point she wanted to seek care again. Reports pain is located in b/l calves and = in intensity b/l. Pain is nearly all the time, but waxes and wanes depending on the day. Feels better when laying in bed, but otherwise no notable changes c/ activity. Uses heating pad on legs, which relieves pain moderately. Ambulates s/ AD, but uses SPC if pain is excessively high. Patients primary goals for PT are to alleviate pain. UPDATE RE 10/31: Patient reports 60-70% improvement in pain intensity. Reports same frequency of pain, but intensity has considerably decreased. No change c/ specific positions or movements. Patient Goals  Patient goals for therapy: increased strength, decreased pain, increased motion and return to sport/leisure activities    Pain  Current pain ratin  At best pain ratin  At worst pain ratin        Objective  Observation:     -Gait: slight trunk flexion, wide DANIAL, no AD     -Core Stability: Able to perform PPT, but quickly fatigues c/ activity. TTP: None  Homans (-) b/l    Neuro Screen:  LE Clonus: (-)    Lumbar ROM (degrees):     IE     RE (10/31)  FLX:  Ankles     ankles  EXT:   Unable to attain neutral  Just before neutral    Repeated Movements Assessment: No change in symptoms c/ repeated standing flexion    Hip ROM: Globally, limited in ROM b/l. Maintained L hip precautions t/o testing. R Hip IR unable to attain neutral. 10* B ER. Hip FLX <90* BLE. Unable to attain neutral hip extension. LE Mobility:      IE  RE (10/31)  Hip Flexor:  -5* b/l  -5* b/l  Hamstring (R/L): 55*/45* 55*/50*  Gastroc (R/L):  -5*/-10* -5*/-7*    LE Strength (MMT Grades, R/L):     IE  RE (10/31)  Hip FLX (Supine): 4-/4  4/4  Hip ABD (Hook-lying): 4-/4  4/4  Knee EXT:  4/4-  4/4  Knee FLX:   4-/4  4/4             Precautions: L UZMA (approx. 12 yrs ago), L TKA (approx.  17 yrs ago)    Daily Treatment Diary: Initial Evaluation Date: 09/27/23  Re-evaluation: 10/31/23  POC Expiration: 12/12/23  Compliance 09/27/23  10/3  10/5  10/10  10/12  10/16  10/24  10/31       Visit Number 1 2  3  4  5  6  7  8       Re-Eval  IE                 [address] Captured Y     Y                     Date 10/16 10/24 10/31 10/10 10/12   Manuals        Hamstring Stretch KS KS KS KS KS   SKTC KS <90* BLE KS <90* BLE KS <90* BLE  KS <90* on L           Neuro Re-Ed        TA Isometric c/ SB 5"/20 10"/10 10"/10 5"/20 5"/20   PPT c/ March        S/Lying Clams        Hook-lying Clams BlTB 5"/20 BlTB 5"/15 BlTB 5"/15 BlTB 5"/20 BlTB 5"/20   Hip ADDuction Iso Hklg x20 Hklg x20 Seated x20 Hook-lying x20 Hklyg x20   Side-stepping        LAURA        Pallof Press        Bridge 3/5 4/5 3/5 3/5 3/5   Bird-dog        Deadbug        Sit to Stand 1/10 1/10 1/10,1/5 2/6 1/10,  1/5           Ther Ex        LTRs x20 x20ea x20ea x20 x20   SKTC        Gastroc Stretch HEP   30"/4 30"/4   HS Stretch Manual   30"/4 30"/4   Hip Flexor Stretch        LAQ 4# 2/10ea 4# 2/10ea 4# x10ea 3# x10ea 3# 2/10ea   HC BlTB 2/10ea BlTB 2/10ea BlTB x10ea BlTB x10ea BlTB 2/10ea   Leg Press        Seated Flexion c/ SB: 3-way x10ea x10ea x10ea x10ea x10ea   Standing Hand/Heel Rock @ Counter        HR/TR x20 nv nv nv nv   Standing: Ext/Abd        NuStep: LE Muscular Endurance L1 6' L1 6' L1 6' L1 6' L1 6'                           Ther Activity        Wall Squat        Lunge                        Modalities                            Access Code: [email or web address]  URL: [email or web address]  Date: 09/27/2023  Prepared by: Ana Grim    Exercises  - Seated Toe Raise  - 1 x daily - 7 x weekly - 1 sets - 15 reps  - Seated Heel Raise  - 1 x daily - 7 x weekly - 1 sets - 15 reps  - Seated Hip Adduction Squeeze with Ball  - 1 x daily - 7 x weekly - 2 sets - 10 reps  - Gastroc Stretch on Wall  - 1 x daily - 7 x weekly - 4 reps - 30 seconds hold

## 2023-10-31 NOTE — PROGRESS NOTES
PT Re-Evaluation     Today's date: 10/31/2023  Patient name: Fernandez Lawson  : 1931  MRN: [id number]  Referring provider: Marjorie Lizama DO  Dx:   Encounter Diagnosis     ICD-10-CM    1. Bilateral leg pain  M79.604     M79.605       2. Osteoarthritis of lumbar spine, unspecified spinal osteoarthritis complication status  J06.004             Start Time: 0800  Stop Time: 0850  Total time in clinic (min): 50 minutes    Assessment  Assessment details: Patient is a 80 y.o. female with medical diagnosis of bilateral leg pain and OA of lumbar spine. Patient continues to demonstrate progress in pain and function as evidenced by improved LE strength and ROM. Functionally, notes subjective improvement in pain intensity during all activities. Continues to be limited in overall hip and lumbar flexibility as well as LE strength, but both are improving. She is progressing towards independence c/ HEP, but continues to require verbal cues prn. Patient's LTGs are progressing, but not fully met at this time. Patient will continue to benefit from skilled PT interventions to address remaining impairments and functional limitations, specific focus on gradual transition to independent HEP. Impairments: abnormal or restricted ROM, abnormal movement, activity intolerance, impaired balance, impaired physical strength, lacks appropriate home exercise program and pain with function    Goals  STG (3 weeks): Progressing. Lumbar Extension to neutral.   Ankle DF to neutral.  Increase LE strength by 1/2 grade. Partially met. LTG (8 weeks): Progressing. LE strength >=1/2 grade. Pain @ worst 5/10.  75% improvement in b/l LE pain. Partially met. Patient will be independent with HEP in 8 weeks to allow independent management of condition. Plan  Plan details: TE, NMR, TA, MT, self-care, and modalities as needed in order to progress through skilled PT focused on ROM, strength, balance, motor control.    Patient would benefit from: skilled physical therapy  Planned modality interventions: cryotherapy and thermotherapy: hydrocollator packs  Planned therapy interventions: manual therapy, neuromuscular re-education, patient education, self care, therapeutic activities, therapeutic exercise and home exercise program  Frequency: 2x week  Duration in weeks: 6  Plan of Care beginning date: 10/31/2023  Plan of Care expiration date: 2023  Treatment plan discussed with: patient      Subjective Evaluation    History of Present Illness  Mechanism of injury: IE: Patient is a 80 y.o. female presenting with b/l leg pain. PMHx (+) for LBP c/w BLE symptoms,was given injections approx. 4-5 years ago which relieved pain for 3 weeks at a time, but they stopped working. Dealt c/ this pain at home until recently. Over past 6-7 months, pain has increased slightly to a point she wanted to seek care again. Reports pain is located in b/l calves and = in intensity b/l. Pain is nearly all the time, but waxes and wanes depending on the day. Feels better when laying in bed, but otherwise no notable changes c/ activity. Uses heating pad on legs, which relieves pain moderately. Ambulates s/ AD, but uses SPC if pain is excessively high. Patients primary goals for PT are to alleviate pain. UPDATE RE 10/31: Patient reports 60-70% improvement in pain intensity. Reports same frequency of pain, but intensity has considerably decreased. No change c/ specific positions or movements. Patient Goals  Patient goals for therapy: increased strength, decreased pain, increased motion and return to sport/leisure activities    Pain  Current pain ratin  At best pain ratin  At worst pain ratin        Objective  Observation:     -Gait: slight trunk flexion, wide DANIAL, no AD     -Core Stability: Able to perform PPT, but quickly fatigues c/ activity.     TTP: None  Homans (-) b/l    Neuro Screen:  LE Clonus: (-)    Lumbar ROM (degrees):     IE     RE (10/31)  FLX:  Ankles     ankles  EXT:   Unable to attain neutral  Just before neutral    Repeated Movements Assessment: No change in symptoms c/ repeated standing flexion    Hip ROM: Globally, limited in ROM b/l. Maintained L hip precautions t/o testing. R Hip IR unable to attain neutral. 10* B ER. Hip FLX <90* BLE. Unable to attain neutral hip extension. LE Mobility:      IE  RE (10/31)  Hip Flexor:  -5* b/l  -5* b/l  Hamstring (R/L): 55*/45* 55*/50*  Gastroc (R/L):  -5*/-10* -5*/-7*    LE Strength (MMT Grades, R/L):     IE  RE (10/31)  Hip FLX (Supine): 4-/4  4/4  Hip ABD (Hook-lying): 4-/4  4/4  Knee EXT:  4/4-  4/4  Knee FLX:   4-/4  4/4             Precautions: L UZMA (approx. 12 yrs ago), L TKA (approx.  17 yrs ago)    Daily Treatment Diary:       Initial Evaluation Date: 09/27/23  Re-evaluation: 10/31/23  POC Expiration: 12/12/23  Compliance 09/27/23  10/3  10/5  10/10  10/12  10/16  10/24  10/31       Visit Number 1 2  3  4  5  6  7  8       Re-Eval  IE                 [facility]   Foto Captured Y     Y                     Date 10/16 10/24 10/31 10/10 10/12   Manuals        Hamstring Stretch KS KS KS KS KS   SKTC KS <90* BLE KS <90* BLE KS <90* BLE  KS <90* on L           Neuro Re-Ed        TA Isometric c/ SB 5"/20 10"/10 10"/10 5"/20 5"/20   PPT c/ March        S/Lying Clams        Hook-lying Clams BlTB 5"/20 BlTB 5"/15 BlTB 5"/15 BlTB 5"/20 BlTB 5"/20   Hip ADDuction Iso Hklg x20 Hklg x20 Seated x20 Hook-lying x20 Hklyg x20   Side-stepping        LAURA        Pallof Press        Bridge 3/5 4/5 3/5 3/5 3/5   Bird-dog        Deadbug        Sit to Stand 1/10 1/10 1/10,1/5 2/6 1/10,  1/5           Ther Ex        LTRs x20 x20ea x20ea x20 x20   SKTC        Gastroc Stretch HEP   30"/4 30"/4   HS Stretch Manual   30"/4 30"/4   Hip Flexor Stretch        LAQ 4# 2/10ea 4# 2/10ea 4# x10ea 3# x10ea 3# 2/10ea   HC BlTB 2/10ea BlTB 2/10ea BlTB x10ea BlTB x10ea BlTB 2/10ea   Leg Press        Seated Flexion c/ SB: 3-way x10ea [id number] x10ea x10ea   Standing Hand/Heel Rock @ Counter        HR/TR x20 nv nv nv nv   Standing: Ext/Abd        NuStep: LE Muscular Endurance L1 6' L1 6' L1 6' L1 6' L1 6'                           Ther Activity        Wall Squat        Lunge                        Modalities                            Access Code: [email or web address]  URL: [email or web address]  Date: 09/27/2023  Prepared by: Feliciano Salmeronus    Exercises  - Seated Toe Raise  - 1 x daily - 7 x weekly - 1 sets - 15 reps  - Seated Heel Raise  - 1 x daily - 7 x weekly - 1 sets - 15 reps  - Seated Hip Adduction Squeeze with Ball  - 1 x daily - 7 x weekly - 2 sets - 10 reps  - Gastroc Stretch on Wall  - 1 x daily - 7 x weekly - 4 reps - 30 seconds hold

## 2023-11-02 ENCOUNTER — OFFICE VISIT (OUTPATIENT)
Dept: PHYSICAL THERAPY | Facility: CLINIC | Age: 88
End: 2023-11-02
Payer: COMMERCIAL

## 2023-11-02 DIAGNOSIS — M79.604 BILATERAL LEG PAIN: Primary | ICD-10-CM

## 2023-11-02 DIAGNOSIS — M47.816 OSTEOARTHRITIS OF LUMBAR SPINE, UNSPECIFIED SPINAL OSTEOARTHRITIS COMPLICATION STATUS: ICD-10-CM

## 2023-11-02 DIAGNOSIS — M79.605 BILATERAL LEG PAIN: Primary | ICD-10-CM

## 2023-11-02 PROCEDURE — 97112 NEUROMUSCULAR REEDUCATION: CPT

## 2023-11-02 PROCEDURE — 97110 THERAPEUTIC EXERCISES: CPT

## 2023-11-02 NOTE — PROGRESS NOTES
Daily Note and Discharge Summary    Today's date: 2023  Patient name: Erwin Florster  : 1931  MRN: [id number]  Referring provider: Alix Valencia DO  Dx:   Encounter Diagnosis     ICD-10-CM    1. Bilateral leg pain  M79.604     M79.605       2. Osteoarthritis of lumbar spine, unspecified spinal osteoarthritis complication status  I85.093           Start Time: 0800  Stop Time: 0850  Total time in clinic (min): 50 minutes    Subjective: Reports LEs feel about the same. Trialed some of the exercises in bed this morning. Feels ready to transition to independent HEP. Objective: See treatment diary below      Assessment: Tolerated treatment well. Patient demonstrated fatigue post treatment and exhibited good technique with therapeutic exercises. Demonstrates good independence c/ HEP. Verbalizes understanding of expectations c/ HEP post-op. She is discharged from PT at this time, will contact us for further care as indicated. Plan: D/c to independent HEP. Precautions: L UZMA (approx. 12 yrs ago), L TKA (approx.  17 yrs ago)    Daily Treatment Diary:       Initial Evaluation Date: 23  Re-evaluation: 10/31/23  POC Expiration: 23  Compliance 09/27/23  10/3  10/5  10/10  10/12  10/16  10/24  10/31  11/2     Visit Number 1 2  3  4  5  6  7  8  9     Re-Eval  IE             RE  DC  [facility]   Foto Captured Y     Y                     Date 10/16 10/24 10/31 11/2 10/12   Manuals        Hamstring Stretch KS KS KS  KS   SKTC KS <90* BLE KS <90* BLE KS <90* BLE  KS <90* on L           Neuro Re-Ed        TA Isometric c/ SB 5"/20 10"/10 10"/10 5"/20 5"/20   PPT / March        S/Lying Clams        Hook-lying Clams BlTB 5"/20 BlTB 5"/15 BlTB 5"/15 BlTB 5"/20 BlTB 5"/20   Hip ADDuction Iso Hklg x20 Hklg x20 Seated x20 Hook-lying x20 Hklyg x20   Side-stepping        LAURA        Pallof Press        Bridge 3/5 4/5 3/5 @ Home 3/5   Bird-dog        Deadbug        Sit to Stand 1/10 1/10 1/10,1/5 1/10, 1/10,  1/5           Ther Ex        LTRs x20 x20ea x20ea x20 x20   SKTC        Gastroc Stretch HEP    30"/4   HS Stretch Manual    30"/4   Hip Flexor Stretch        LAQ 4# 2/10ea 4# 2/10ea 4# x10ea 4# x10ea 3# 2/10ea   HC BlTB 2/10ea BlTB 2/10ea BlTB x10ea BlTB x10ea BlTB 2/10ea   Leg Press        Seated Flexion c/ SB: 3-way x10ea x10ea x10ea x10ea x10ea   Standing Hand/Heel Rock @ Counter        HR/TR x20 nv nv np nv   Standing: Ext/Abd        NuStep: LE Muscular Endurance L1 6' L1 6' L1 6' L1 9' L1 6'                           Ther Activity        Wall Squat        Lunge                        Modalities                            Access Code: [email or web address]  URL: [email or web address]  Date: 09/27/2023  Prepared by: Seth Bulla    Exercises  - Seated Toe Raise  - 1 x daily - 7 x weekly - 1 sets - 15 reps  - Seated Heel Raise  - 1 x daily - 7 x weekly - 1 sets - 15 reps  - Seated Hip Adduction Squeeze with Ball  - 1 x daily - 7 x weekly - 2 sets - 10 reps  - Gastroc Stretch on Wall  - 1 x daily - 7 x weekly - 4 reps - 30 seconds hold

## 2024-09-27 ENCOUNTER — OFFICE VISIT (OUTPATIENT)
Dept: URGENT CARE | Facility: CLINIC | Age: 89
End: 2024-09-27
Payer: COMMERCIAL

## 2024-09-27 VITALS
OXYGEN SATURATION: 95 % | BODY MASS INDEX: 28.52 KG/M2 | HEIGHT: 62 IN | SYSTOLIC BLOOD PRESSURE: 113 MMHG | WEIGHT: 155 LBS | RESPIRATION RATE: 16 BRPM | DIASTOLIC BLOOD PRESSURE: 59 MMHG | TEMPERATURE: 97.5 F | HEART RATE: 91 BPM

## 2024-09-27 DIAGNOSIS — N30.01 ACUTE CYSTITIS WITH HEMATURIA: Primary | ICD-10-CM

## 2024-09-27 LAB
SL AMB  POCT GLUCOSE, UA: ABNORMAL
SL AMB LEUKOCYTE ESTERASE,UA: ABNORMAL
SL AMB POCT BILIRUBIN,UA: ABNORMAL
SL AMB POCT BLOOD,UA: ABNORMAL
SL AMB POCT CLARITY,UA: ABNORMAL
SL AMB POCT COLOR,UA: YELLOW
SL AMB POCT KETONES,UA: ABNORMAL
SL AMB POCT NITRITE,UA: ABNORMAL
SL AMB POCT PH,UA: 6
SL AMB POCT SPECIFIC GRAVITY,UA: 1.02
SL AMB POCT URINE PROTEIN: ABNORMAL
SL AMB POCT UROBILINOGEN: NORMAL

## 2024-09-27 PROCEDURE — 81002 URINALYSIS NONAUTO W/O SCOPE: CPT

## 2024-09-27 PROCEDURE — 87086 URINE CULTURE/COLONY COUNT: CPT

## 2024-09-27 PROCEDURE — 87077 CULTURE AEROBIC IDENTIFY: CPT

## 2024-09-27 PROCEDURE — 99213 OFFICE O/P EST LOW 20 MIN: CPT

## 2024-09-27 PROCEDURE — 87186 SC STD MICRODIL/AGAR DIL: CPT

## 2024-09-27 RX ORDER — CEPHALEXIN 500 MG/1
500 CAPSULE ORAL EVERY 12 HOURS SCHEDULED
Qty: 14 CAPSULE | Refills: 0 | Status: SHIPPED | OUTPATIENT
Start: 2024-09-27 | End: 2024-10-04

## 2024-09-27 NOTE — PATIENT INSTRUCTIONS
Take full course of keflex as prescribed  Eat yogurt with live and active cultures and/or take a probiotic at least 3 hours before or after antibiotic dose. Monitor stool for diarrhea and/or blood. If this occurs, contact primary care doctor ASAP.     Drink plenty of water   Cranberry supplements  Avoid holding in urine  Always wipe front to back    Follow up with PCP in 3-5 days.  Proceed to  ER if symptoms worsen.    If tests are performed, our office will contact you with results only if changes need to made to the care plan discussed with you at the visit. You can review your full results on [facility].

## 2024-09-27 NOTE — PROGRESS NOTES
[facility]        NAME: Bibi Box is a 89 y.o. female  : 1931    MRN: [id number]  DATE: 2024  TIME: 8:19 AM    Assessment and Plan   Acute cystitis with hematuria [N30.01]  1. Acute cystitis with hematuria  POCT urine dip    Urine culture    Urine culture    cephalexin (KEFLEX) 500 mg capsule        Urine dip positive for blood and leukocytes    Patient Instructions     Take full course of keflex as prescribed  Eat yogurt with live and active cultures and/or take a probiotic at least 3 hours before or after antibiotic dose. Monitor stool for diarrhea and/or blood. If this occurs, contact primary care doctor ASAP.     Drink plenty of water   Cranberry supplements  Avoid holding in urine  Always wipe front to back    Follow up with PCP in 3-5 days.  Proceed to  ER if symptoms worsen.    If tests are performed, our office will contact you with results only if changes need to made to the care plan discussed with you at the visit. [facility]    Chief Complaint     Chief Complaint   Patient presents with    Possible UTI     Started:last night   Burning sensation         History of Present Illness       89-year-old female arrives reporting urinary burning with increased frequency and urgency since yesterday.  Patient denies any abdominal pain, nausea vomiting or diarrhea.  Patient denies any vaginal bleeding or discharge.  Patient denies any fevers.  Patient does report a history of UTIs and reports this feels similar.        Review of Systems   Review of Systems   Constitutional:  Negative for chills, diaphoresis, fatigue and fever.   Gastrointestinal:  Negative for abdominal pain, diarrhea, nausea and vomiting.   Genitourinary:  Positive for dysuria, frequency and urgency. Negative for vaginal bleeding, vaginal discharge and vaginal pain.         Current Medications       Current Outpatient Medications:     aspirin 81 mg chewable tablet,  Chew 81 mg daily, Disp: , Rfl:     cephalexin (KEFLEX) 500 mg capsule, Take 1 capsule (500 mg total) by mouth every 12 (twelve) hours for 7 days, Disp: 14 capsule, Rfl: 0    co-enzyme Q-10 30 MG capsule, Take 30 mg by mouth 3 (three) times a day, Disp: , Rfl:     CRANBERRY PO, Take by mouth, Disp: , Rfl:     diazepam (VALIUM) 5 mg tablet, 5 mg every 6 (six) hours as needed , Disp: , Rfl:     Diclofenac Sodium (VOLTAREN) 1 %, , Disp: , Rfl:     docusate sodium (COLACE) 50 mg capsule, Take 100 mg by mouth 2 (two) times a day , Disp: , Rfl:     ELDERBERRY PO, Take by mouth, Disp: , Rfl:     GARLIC PO, Take by mouth, Disp: , Rfl:     lisinopril (ZESTRIL) 40 mg tablet, Take 40 mg by mouth daily , Disp: , Rfl:     magnesium oxide (MAG-OX) 400 mg tablet, Take by mouth, Disp: , Rfl:     omega-3-acid ethyl esters (LOVAZA) 1 g capsule, Take 2 g by mouth 2 (two) times a day, Disp: , Rfl:     simvastatin (ZOCOR) 10 mg tablet, 10 mg , Disp: , Rfl:     VITAMIN D, CHOLECALCIFEROL, PO, Take 2,000 Units by mouth, Disp: , Rfl:     Zinc 50 MG TABS, Take by mouth, Disp: , Rfl:     estradiol (ESTRACE VAGINAL) 0.1 mg/g vaginal cream, Insert 1 g into the vagina 2 (two) times a week Apply pea size amount to external vagina 2 times per week (Patient not taking: Reported on 5/19/2022), Disp: 42.5 g, Rfl: 1    HYDROcodone-acetaminophen (NORCO) 5-325 mg per tablet, , Disp: , Rfl:     saccharomyces boulardii (FLORASTOR) 250 mg capsule, Take 250 mg by mouth 2 (two) times a day (Patient not taking: Reported on 8/2/2022), Disp: , Rfl:     zinc gluconate 50 mg tablet, Take 50 mg by mouth daily   (Patient not taking: Reported on 5/19/2022), Disp: , Rfl:     Current Allergies     Allergies as of 09/27/2024 - Reviewed 09/27/2024   Allergen Reaction Noted    Estradiol Other (See Comments) 05/19/2022    Sulfamethoxazole-trimethoprim Other (See Comments) 07/26/2020    Ibuprofen Other (See Comments) 10/03/2012            The following portions of the  "patient's history were reviewed and updated as appropriate: allergies, current medications, past family history, past medical history, past social history, past surgical history and problem list.     Past Medical History:   Diagnosis Date    Arthritis     High cholesterol     Hypertension     Urinary tract infection        Past Surgical History:   Procedure Laterality Date    APPENDECTOMY      CHOLECYSTECTOMY      HIP SURGERY      HYSTERECTOMY      JOINT REPLACEMENT      KNEE SURGERY         Family History   Problem Relation Age of Onset    Cancer Mother     Stroke Father          Medications have been verified.        Objective   /59   Pulse 91   Temp 97.5 °F (36.4 °C)   Resp 16   Ht 5' 2\" (1.575 m)   Wt 70.3 kg (155 lb)   SpO2 95%   BMI 28.35 kg/m²        Physical Exam     Physical Exam  Vitals and nursing note reviewed.   Constitutional:       General: She is not in acute distress.     Appearance: Normal appearance. She is not ill-appearing.   HENT:      Head: Normocephalic.      Right Ear: External ear normal.      Left Ear: External ear normal.      Nose: Nose normal.   Eyes:      Pupils: Pupils are equal, round, and reactive to light.   Cardiovascular:      Rate and Rhythm: Normal rate and regular rhythm.      Pulses: Normal pulses.      Heart sounds: Normal heart sounds.   Pulmonary:      Effort: Pulmonary effort is normal. No respiratory distress.      Breath sounds: Normal breath sounds. No stridor. No wheezing, rhonchi or rales.   Chest:      Chest wall: No tenderness.   Abdominal:      General: Bowel sounds are normal. There is no distension.      Palpations: Abdomen is soft. There is no mass.      Tenderness: There is no abdominal tenderness. There is no right CVA tenderness, left CVA tenderness, guarding or rebound.      Hernia: No hernia is present.   Musculoskeletal:         General: Normal range of motion.      Cervical back: Normal range of motion and neck supple.   Lymphadenopathy:     "  Cervical: No cervical adenopathy.   Skin:     General: Skin is warm and dry.      Capillary Refill: Capillary refill takes less than 2 seconds.   Neurological:      General: No focal deficit present.      Mental Status: She is alert and oriented to person, place, and time.   Psychiatric:         Mood and Affect: Mood normal.         Behavior: Behavior normal.

## 2024-09-29 LAB — BACTERIA UR CULT: ABNORMAL

## 2025-02-05 ENCOUNTER — OFFICE VISIT (OUTPATIENT)
Dept: URGENT CARE | Facility: CLINIC | Age: OVER 89
End: 2025-02-05
Payer: COMMERCIAL

## 2025-02-05 ENCOUNTER — APPOINTMENT (OUTPATIENT)
Dept: RADIOLOGY | Facility: CLINIC | Age: OVER 89
End: 2025-02-05
Payer: COMMERCIAL

## 2025-02-05 VITALS
HEART RATE: 101 BPM | TEMPERATURE: 97.5 F | OXYGEN SATURATION: 95 % | DIASTOLIC BLOOD PRESSURE: 68 MMHG | SYSTOLIC BLOOD PRESSURE: 133 MMHG | RESPIRATION RATE: 18 BRPM | BODY MASS INDEX: 28.52 KG/M2 | HEIGHT: 62 IN | WEIGHT: 155 LBS

## 2025-02-05 DIAGNOSIS — S80.11XA CONTUSION OF RIGHT LOWER EXTREMITY, INITIAL ENCOUNTER: Primary | ICD-10-CM

## 2025-02-05 DIAGNOSIS — S81.811A SKIN TEAR OF RIGHT LOWER LEG WITHOUT COMPLICATION, INITIAL ENCOUNTER: ICD-10-CM

## 2025-02-05 DIAGNOSIS — Z23 ENCOUNTER FOR IMMUNIZATION: ICD-10-CM

## 2025-02-05 DIAGNOSIS — M79.604 RIGHT LEG PAIN: ICD-10-CM

## 2025-02-05 PROCEDURE — 90715 TDAP VACCINE 7 YRS/> IM: CPT

## 2025-02-05 PROCEDURE — 73590 X-RAY EXAM OF LOWER LEG: CPT

## 2025-02-05 PROCEDURE — 99213 OFFICE O/P EST LOW 20 MIN: CPT | Performed by: PHYSICIAN ASSISTANT

## 2025-02-05 NOTE — PROGRESS NOTES
"[facility]        NAME: Bibi Box is a 89 y.o. female  : 1931    MRN: [id number]  DATE: 2025  TIME: 7:11 PM    /68   Pulse 101   Temp 97.5 °F (36.4 °C) (Tympanic)   Resp 18   Ht 5' 2\" (1.575 m)   Wt 70.3 kg (155 lb)   SpO2 95%   BMI 28.35 kg/m²     Assessment and Plan   Contusion of right lower extremity, initial encounter [S80.11XA]  1. Contusion of right lower extremity, initial encounter  XR tibia fibula 2 vw right    Laceration repair      2. Encounter for immunization  Tdap Vaccine greater than or equal to 8yo    Laceration repair      3. Skin tear of right lower leg without complication, initial encounter  Tdap Vaccine greater than or equal to 8yo    Laceration repair            Patient Instructions       Follow up with PCP in 3-5 days.  Proceed to  ER if symptoms worsen.    Chief Complaint     Chief Complaint   Patient presents with    Leg Pain     Right shin skin tear from fall up stairs          History of Present Illness       Pt with right lower leg scrape on bus step several hours ago  pt with some pain no other complaints,  pt tripped, rememmers entire event  pt with no other complaints     Leg Pain         Review of Systems   Review of Systems   Constitutional: Negative.    HENT: Negative.     Eyes: Negative.    Respiratory: Negative.     Cardiovascular: Negative.    Gastrointestinal: Negative.    Endocrine: Negative.    Genitourinary: Negative.    Musculoskeletal: Negative.    Skin: Negative.    Allergic/Immunologic: Negative.    Neurological: Negative.    Hematological: Negative.    Psychiatric/Behavioral: Negative.     All other systems reviewed and are negative.        Current Medications       Current Outpatient Medications:     aspirin 81 mg chewable tablet, Chew 81 mg daily, Disp: , Rfl:     co-enzyme Q-10 30 MG capsule, Take 30 mg by mouth 3 (three) times a day, Disp: , Rfl:     CRANBERRY PO, Take by mouth, Disp: , Rfl:     Diclofenac Sodium " (VOLTAREN) 1 %, , Disp: , Rfl:     docusate sodium (COLACE) 50 mg capsule, Take 100 mg by mouth 2 (two) times a day , Disp: , Rfl:     GARLIC PO, Take by mouth, Disp: , Rfl:     lisinopril (ZESTRIL) 40 mg tablet, Take 40 mg by mouth daily , Disp: , Rfl:     magnesium oxide (MAG-OX) 400 mg tablet, Take by mouth, Disp: , Rfl:     omega-3-acid ethyl esters (LOVAZA) 1 g capsule, Take 2 g by mouth 2 (two) times a day, Disp: , Rfl:     simvastatin (ZOCOR) 10 mg tablet, 10 mg , Disp: , Rfl:     VITAMIN D, CHOLECALCIFEROL, PO, Take 2,000 Units by mouth, Disp: , Rfl:     Zinc 50 MG TABS, Take by mouth, Disp: , Rfl:     diazepam (VALIUM) 5 mg tablet, 5 mg every 6 (six) hours as needed  (Patient not taking: Reported on 2/5/2025), Disp: , Rfl:     ELDERBERRY PO, Take by mouth (Patient not taking: Reported on 2/5/2025), Disp: , Rfl:     estradiol (ESTRACE VAGINAL) 0.1 mg/g vaginal cream, Insert 1 g into the vagina 2 (two) times a week Apply pea size amount to external vagina 2 times per week (Patient not taking: Reported on 2/5/2025), Disp: 42.5 g, Rfl: 1    HYDROcodone-acetaminophen (NORCO) 5-325 mg per tablet, , Disp: , Rfl:     saccharomyces boulardii (FLORASTOR) 250 mg capsule, Take 250 mg by mouth 2 (two) times a day (Patient not taking: Reported on 2/5/2025), Disp: , Rfl:     zinc gluconate 50 mg tablet, Take 50 mg by mouth daily   (Patient not taking: Reported on 2/5/2025), Disp: , Rfl:     Current Allergies     Allergies as of 02/05/2025 - Reviewed 02/05/2025   Allergen Reaction Noted    Estradiol Other (See Comments) 05/19/2022    Sulfamethoxazole-trimethoprim Other (See Comments) 07/26/2020    Ibuprofen Other (See Comments) 10/03/2012            The following portions of the patient's history were reviewed and updated as appropriate: allergies, current medications, past family history, past medical history, past social history, past surgical history and problem list.     Past Medical History:   Diagnosis Date     "Arthritis     High cholesterol     Hypertension     Urinary tract infection        Past Surgical History:   Procedure Laterality Date    APPENDECTOMY      CHOLECYSTECTOMY      HIP SURGERY      HYSTERECTOMY      JOINT REPLACEMENT      KNEE SURGERY         Family History   Problem Relation Age of Onset    Cancer Mother     Stroke Father          Medications have been verified.        Objective   /68   Pulse 101   Temp 97.5 °F (36.4 °C) (Tympanic)   Resp 18   Ht 5' 2\" (1.575 m)   Wt 70.3 kg (155 lb)   SpO2 95%   BMI 28.35 kg/m²        Physical Exam     Physical Exam  Vitals and nursing note reviewed.   Constitutional:       Appearance: Normal appearance. She is normal weight.      Comments: Pt ambulation is pt's normal  pt normally uses cane     HENT:      Head: Normocephalic and atraumatic.      Right Ear: Tympanic membrane, ear canal and external ear normal.      Left Ear: Tympanic membrane, ear canal and external ear normal.      Nose: Nose normal.      Mouth/Throat:      Mouth: Mucous membranes are moist.      Pharynx: Oropharynx is clear.   Eyes:      General: Scleral icterus: boostrix.      Extraocular Movements: Extraocular movements intact.      Conjunctiva/sclera: Conjunctivae normal.      Pupils: Pupils are equal, round, and reactive to light.   Cardiovascular:      Rate and Rhythm: Normal rate and regular rhythm.      Pulses: Normal pulses.      Heart sounds: Normal heart sounds.   Pulmonary:      Effort: Pulmonary effort is normal.      Breath sounds: Normal breath sounds.   Abdominal:      General: Bowel sounds are normal.      Palpations: Abdomen is soft.   Musculoskeletal:         General: Normal range of motion.      Cervical back: Normal range of motion and neck supple.      Comments: Right lower leg  superficial large skin tear 3cm wide x 30cm almost entire anterior tib   No ankle pain no knee pain     Knee and ankle from distal neuro and vascular wnl    Skin:     General: Skin is warm. "      Capillary Refill: Capillary refill takes less than 2 seconds.   Neurological:      Mental Status: She is alert and oriented to person, place, and time.   Psychiatric:         Mood and Affect: Mood normal.           Universal Protocol:  Consent: Verbal consent obtained. Written consent not obtained.  Consent given by: patient  Patient identity confirmed: verbally with patient  Laceration repair    Date/Time: 2/5/2025 3:50 PM    Performed by: Bryan Regan Jr., PA-C  Authorized by: Bryan Regan Jr., PA-C  Body area: lower extremity  Location details: right lower leg  Laceration length: 25 cm  Foreign bodies: no foreign bodies  Tendon involvement: none  Nerve involvement: none  Vascular damage: no      Procedure Details:  Irrigation solution: saline  Irrigation method: syringe  Amount of cleaning: extensive  Debridement: none  Degree of undermining: none  Skin closure: Steri-Strips  Approximation: loose  Approximation difficulty: simple  Dressing: 4x4 sterile gauze and non-adhesive packing strip  Patient tolerance: patient tolerated the procedure well with no immediate complications  Comments: Superficial skin tear         '

## 2025-03-03 ENCOUNTER — OFFICE VISIT (OUTPATIENT)
Dept: URGENT CARE | Facility: CLINIC | Age: OVER 89
End: 2025-03-03
Payer: COMMERCIAL

## 2025-03-03 VITALS
OXYGEN SATURATION: 96 % | HEIGHT: 61 IN | WEIGHT: 148 LBS | BODY MASS INDEX: 27.94 KG/M2 | RESPIRATION RATE: 24 BRPM | SYSTOLIC BLOOD PRESSURE: 174 MMHG | TEMPERATURE: 97.4 F | HEART RATE: 90 BPM | DIASTOLIC BLOOD PRESSURE: 79 MMHG

## 2025-03-03 DIAGNOSIS — R39.9 UTI SYMPTOMS: Primary | ICD-10-CM

## 2025-03-03 LAB
SL AMB  POCT GLUCOSE, UA: NEGATIVE
SL AMB LEUKOCYTE ESTERASE,UA: ABNORMAL
SL AMB POCT BILIRUBIN,UA: NEGATIVE
SL AMB POCT BLOOD,UA: ABNORMAL
SL AMB POCT CLARITY,UA: ABNORMAL
SL AMB POCT COLOR,UA: YELLOW
SL AMB POCT KETONES,UA: NEGATIVE
SL AMB POCT NITRITE,UA: NEGATIVE
SL AMB POCT PH,UA: 7
SL AMB POCT SPECIFIC GRAVITY,UA: 1.01
SL AMB POCT URINE PROTEIN: ABNORMAL
SL AMB POCT UROBILINOGEN: NORMAL

## 2025-03-03 PROCEDURE — 99213 OFFICE O/P EST LOW 20 MIN: CPT

## 2025-03-03 PROCEDURE — 87186 SC STD MICRODIL/AGAR DIL: CPT

## 2025-03-03 PROCEDURE — 87077 CULTURE AEROBIC IDENTIFY: CPT

## 2025-03-03 PROCEDURE — 81002 URINALYSIS NONAUTO W/O SCOPE: CPT

## 2025-03-03 PROCEDURE — 87086 URINE CULTURE/COLONY COUNT: CPT

## 2025-03-03 RX ORDER — LATANOPROST 50 UG/ML
SOLUTION/ DROPS OPHTHALMIC
COMMUNITY
Start: 2025-02-17

## 2025-03-03 RX ORDER — CALCIUM CARBONATE 500 MG/1
TABLET, CHEWABLE ORAL
COMMUNITY

## 2025-03-03 RX ORDER — HYDROCORTISONE ACETATE 0.5 %
CREAM (GRAM) TOPICAL
COMMUNITY

## 2025-03-03 RX ORDER — CEPHALEXIN 500 MG/1
500 CAPSULE ORAL EVERY 12 HOURS SCHEDULED
Qty: 14 CAPSULE | Refills: 0 | Status: SHIPPED | OUTPATIENT
Start: 2025-03-03 | End: 2025-03-10

## 2025-03-04 NOTE — PROGRESS NOTES
[facility]        NAME: Bibi Box is a 89 y.o. female  : 1931    MRN: [id number]  DATE: March 3, 2025  TIME: 7:44 PM    Assessment and Plan   UTI symptoms [R39.9]  1. UTI symptoms  POCT urine dip    Urine culture    Urine culture    cephalexin (KEFLEX) 500 mg capsule        Urine dip - positive for signs of infection.   Urine culture is pending - We will only notify you if there needs to be a change in your treatment plan.       Patient Instructions   Urine dip - positive for signs of infection.   Urine culture is pending - We will only notify you if there needs to be a change in your treatment plan.     Take cephalexin as prescribed  Eat yogurt with live and active cultures and/or take a probiotic at least 3 hours before or after antibiotic dose. Monitor stool for diarrhea and/or blood. If this occurs, contact primary care doctor ASAP.     Drink plenty of water   Cranberry supplements  Urinate within 5 minutes following intercourse  Follow up with OB/GYN  Always wipe front to back  Avoid holding in urine.     Follow up with PCP in 3-5 days.  Proceed to  ER if symptoms worsen.    If tests are performed, our office will contact you with results only if changes need to made to the care plan discussed with you at the visit. [facility]    Chief Complaint     Chief Complaint   Patient presents with    Possible UTI     Frequent urination, burning with urination, and bladder pain starting today.         History of Present Illness       89 year old female arrives reporting increased frequency and urinary burning increasing since awaking this morning.  Patient reports a history of UTIs and reports this feels similar.  Patient denies any abdominal pain, nausea vomiting or diarrhea.  Patient does report some pressure in her bladder area.  Patient denies any fevers.        Review of Systems   Review of Systems   Constitutional: Negative.    HENT: Negative.      Respiratory: Negative.     Cardiovascular: Negative.    Gastrointestinal: Negative.    Genitourinary:  Positive for dysuria and frequency. Negative for vaginal bleeding, vaginal discharge and vaginal pain.   Musculoskeletal: Negative.          Current Medications       Current Outpatient Medications:     cephalexin (KEFLEX) 500 mg capsule, Take 1 capsule (500 mg total) by mouth every 12 (twelve) hours for 7 days, Disp: 14 capsule, Rfl: 0    latanoprost (XALATAN) 0.005 % ophthalmic solution, , Disp: , Rfl:     aspirin 81 mg chewable tablet, Chew 81 mg daily, Disp: , Rfl:     calcium carbonate (Tums) 500 mg chewable tablet, Chew, Disp: , Rfl:     co-enzyme Q-10 30 MG capsule, Take 30 mg by mouth 3 (three) times a day, Disp: , Rfl:     CRANBERRY PO, Take by mouth, Disp: , Rfl:     diazepam (VALIUM) 5 mg tablet, 5 mg every 6 (six) hours as needed, Disp: , Rfl:     Diclofenac Sodium (VOLTAREN) 1 %, , Disp: , Rfl:     docusate sodium (COLACE) 50 mg capsule, Take 100 mg by mouth 2 (two) times a day , Disp: , Rfl:     ELDERBERRY PO, Take by mouth (Patient not taking: Reported on 3/3/2025), Disp: , Rfl:     estradiol (ESTRACE VAGINAL) 0.1 mg/g vaginal cream, Insert 1 g into the vagina 2 (two) times a week Apply pea size amount to external vagina 2 times per week (Patient not taking: Reported on 5/19/2022), Disp: 42.5 g, Rfl: 1    GARLIC PO, Take by mouth (Patient not taking: Reported on 3/3/2025), Disp: , Rfl:     Glucosamine-Chondroit-Vit C-Mn (Glucosamine Chondr 1500 Complx) CAPS, Take by mouth, Disp: , Rfl:     HYDROcodone-acetaminophen (NORCO) 5-325 mg per tablet, , Disp: , Rfl:     lisinopril (ZESTRIL) 40 mg tablet, Take 40 mg by mouth daily , Disp: , Rfl:     magnesium oxide (MAG-OX) 400 mg tablet, Take by mouth (Patient not taking: Reported on 3/3/2025), Disp: , Rfl:     omega-3-acid ethyl esters (LOVAZA) 1 g capsule, Take 2 g by mouth 2 (two) times a day, Disp: , Rfl:     saccharomyces boulardii (FLORASTOR) 250 mg  "capsule, Take 250 mg by mouth 2 (two) times a day (Patient not taking: Reported on 8/2/2022), Disp: , Rfl:     simvastatin (ZOCOR) 10 mg tablet, 10 mg , Disp: , Rfl:     VITAMIN D, CHOLECALCIFEROL, PO, Take 2,000 Units by mouth, Disp: , Rfl:     Zinc 50 MG TABS, Take by mouth, Disp: , Rfl:     zinc gluconate 50 mg tablet, Take 50 mg by mouth daily   (Patient not taking: Reported on 5/19/2022), Disp: , Rfl:     Current Allergies     Allergies as of 03/03/2025 - Reviewed 03/03/2025   Allergen Reaction Noted    Estradiol Other (See Comments) 05/19/2022    Sulfamethoxazole-trimethoprim Other (See Comments) 07/26/2020    Ibuprofen Other (See Comments) 10/03/2012            The following portions of the patient's history were reviewed and updated as appropriate: allergies, current medications, past family history, past medical history, past social history, past surgical history and problem list.     Past Medical History:   Diagnosis Date    Arthritis     High cholesterol     Hypertension     Urinary tract infection        Past Surgical History:   Procedure Laterality Date    APPENDECTOMY      CHOLECYSTECTOMY      HIP SURGERY      HYSTERECTOMY      JOINT REPLACEMENT      KNEE SURGERY         Family History   Problem Relation Age of Onset    Cancer Mother     Stroke Father          Medications have been verified.        Objective   BP (!) 174/79   Pulse 90   Temp (!) 97.4 °F (36.3 °C)   Resp (!) 24   Ht 5' 1\" (1.549 m)   Wt 67.1 kg (148 lb)   SpO2 96%   BMI 27.96 kg/m²        Physical Exam     Physical Exam  Vitals and nursing note reviewed.   Constitutional:       General: She is not in acute distress.     Appearance: Normal appearance. She is not ill-appearing, toxic-appearing or diaphoretic.   HENT:      Head: Normocephalic.      Right Ear: External ear normal.      Left Ear: External ear normal.      Nose: Nose normal.   Eyes:      Pupils: Pupils are equal, round, and reactive to light.   Cardiovascular:      " Rate and Rhythm: Normal rate and regular rhythm.      Pulses: Normal pulses.      Heart sounds: Normal heart sounds.   Pulmonary:      Effort: Pulmonary effort is normal. No respiratory distress.      Breath sounds: Normal breath sounds. No stridor. No wheezing, rhonchi or rales.   Chest:      Chest wall: No tenderness.   Abdominal:      General: Bowel sounds are normal. There is no distension.      Palpations: Abdomen is soft. There is no mass.      Tenderness: There is no abdominal tenderness. There is no right CVA tenderness, left CVA tenderness, guarding or rebound.      Hernia: No hernia is present.   Musculoskeletal:         General: Normal range of motion.      Cervical back: Normal range of motion and neck supple.   Lymphadenopathy:      Cervical: No cervical adenopathy.   Skin:     General: Skin is warm and dry.      Capillary Refill: Capillary refill takes less than 2 seconds.   Neurological:      General: No focal deficit present.      Mental Status: She is alert and oriented to person, place, and time.   Psychiatric:         Mood and Affect: Mood normal.         Behavior: Behavior normal.

## 2025-03-04 NOTE — PATIENT INSTRUCTIONS
Urine dip - positive for signs of infection.   Urine culture is pending - We will only notify you if there needs to be a change in your treatment plan.     Take cephalexin as prescribed  Eat yogurt with live and active cultures and/or take a probiotic at least 3 hours before or after antibiotic dose. Monitor stool for diarrhea and/or blood. If this occurs, contact primary care doctor ASAP.     Drink plenty of water   Cranberry supplements  Urinate within 5 minutes following intercourse  Follow up with OB/GYN  Always wipe front to back  Avoid holding in urine.     Follow up with PCP in 3-5 days.  Proceed to  ER if symptoms worsen.    If tests are performed, our office will contact you with results only if changes need to made to the care plan discussed with you at the visit. You can review your full results on [facility].

## 2025-03-06 ENCOUNTER — RESULTS FOLLOW-UP (OUTPATIENT)
Dept: URGENT CARE | Facility: CLINIC | Age: OVER 89
End: 2025-03-06

## 2025-03-06 LAB — BACTERIA UR CULT: ABNORMAL

## 2025-04-28 ENCOUNTER — OFFICE VISIT (OUTPATIENT)
Dept: URGENT CARE | Facility: CLINIC | Age: OVER 89
End: 2025-04-28
Payer: COMMERCIAL

## 2025-04-28 VITALS
HEART RATE: 99 BPM | HEIGHT: 62 IN | OXYGEN SATURATION: 96 % | SYSTOLIC BLOOD PRESSURE: 115 MMHG | DIASTOLIC BLOOD PRESSURE: 59 MMHG | WEIGHT: 154 LBS | RESPIRATION RATE: 20 BRPM | TEMPERATURE: 96.9 F | BODY MASS INDEX: 28.34 KG/M2

## 2025-04-28 DIAGNOSIS — N30.01 ACUTE CYSTITIS WITH HEMATURIA: Primary | ICD-10-CM

## 2025-04-28 LAB
SL AMB  POCT GLUCOSE, UA: ABNORMAL
SL AMB LEUKOCYTE ESTERASE,UA: ABNORMAL
SL AMB POCT BILIRUBIN,UA: ABNORMAL
SL AMB POCT BLOOD,UA: ABNORMAL
SL AMB POCT CLARITY,UA: ABNORMAL
SL AMB POCT COLOR,UA: YELLOW
SL AMB POCT KETONES,UA: ABNORMAL
SL AMB POCT NITRITE,UA: ABNORMAL
SL AMB POCT PH,UA: 6
SL AMB POCT SPECIFIC GRAVITY,UA: 1.01
SL AMB POCT URINE PROTEIN: 100
SL AMB POCT UROBILINOGEN: 0.2

## 2025-04-28 PROCEDURE — 87186 SC STD MICRODIL/AGAR DIL: CPT

## 2025-04-28 PROCEDURE — 87086 URINE CULTURE/COLONY COUNT: CPT

## 2025-04-28 PROCEDURE — 81002 URINALYSIS NONAUTO W/O SCOPE: CPT

## 2025-04-28 PROCEDURE — 99213 OFFICE O/P EST LOW 20 MIN: CPT

## 2025-04-28 PROCEDURE — 87077 CULTURE AEROBIC IDENTIFY: CPT

## 2025-04-28 RX ORDER — CEPHALEXIN 500 MG/1
500 CAPSULE ORAL EVERY 12 HOURS SCHEDULED
Qty: 14 CAPSULE | Refills: 0 | Status: SHIPPED | OUTPATIENT
Start: 2025-04-28 | End: 2025-05-05

## 2025-04-28 NOTE — PATIENT INSTRUCTIONS
Urine dip - positive for signs of infection  Urine culture is pending - We will only notify you if there needs to be a change in your treatment plan.     Take cephalexin as prescribed  Eat yogurt with live and active cultures and/or take a probiotic at least 3 hours before or after antibiotic dose. Monitor stool for diarrhea and/or blood. If this occurs, contact primary care doctor ASAP.      Recurrent UTI Recommendations:  Please always ensure your urine is sent for a URINE CULTURE if you get UTIs frequently.    Cranberry vitamins (500 mg daily) NOT JUICE- juice is too sugary and acidic    D-mannose: this helps build a protection in the bladder to prevent bacterial growth    Vaginal moisturizers (Replens/Lenuva/coconut oil): you can help protect against bacteria from the outside, not just the inside; lack of estrogen makes the pelvic area more susceptible to infection in post-menopausal women    Good hydration helps to dilute urine    Speak with a  urologist/urogynecologist if infections or symptoms of burning/frequency continue to consider preventive UTI management or incontinence management    Also wipe front to back    Shower instead of baths    Change feminine products every 2-4 hours      Follow up with PCP in 3-5 days.  Proceed to  ER if symptoms worsen.    If tests are performed, our office will contact you with results only if changes need to made to the care plan discussed with you at the visit. You can review your full results on [facility].

## 2025-04-28 NOTE — PROGRESS NOTES
[facility]        NAME: Bibi Box is a 89 y.o. female  : 1931    MRN: [id number]  DATE: 2025  TIME: 1:16 PM    Assessment and Plan   Acute cystitis with hematuria [N30.01]  1. Acute cystitis with hematuria  POCT urine dip    Urine culture    Ambulatory Referral to Urology    Urine culture    cephalexin (KEFLEX) 500 mg capsule        Urine dip - positive for signs of infection  Urine culture is pending - We will only notify you if there needs to be a change in your treatment plan.       Patient Instructions   Urine dip - positive for signs of infection  Urine culture is pending - We will only notify you if there needs to be a change in your treatment plan.     Take cephalexin as prescribed  Eat yogurt with live and active cultures and/or take a probiotic at least 3 hours before or after antibiotic dose. Monitor stool for diarrhea and/or blood. If this occurs, contact primary care doctor ASAP.      Recurrent UTI Recommendations:  Please always ensure your urine is sent for a URINE CULTURE if you get UTIs frequently.    Cranberry vitamins (500 mg daily) NOT JUICE- juice is too sugary and acidic    D-mannose: this helps build a protection in the bladder to prevent bacterial growth    Vaginal moisturizers (Replens/Lenuva/coconut oil): you can help protect against bacteria from the outside, not just the inside; lack of estrogen makes the pelvic area more susceptible to infection in post-menopausal women    Good hydration helps to dilute urine    Speak with a  urologist/urogynecologist if infections or symptoms of burning/frequency continue to consider preventive UTI management or incontinence management    Also wipe front to back    Shower instead of baths    Change feminine products every 2-4 hours      Follow up with PCP in 3-5 days.  Proceed to  ER if symptoms worsen.    If tests are performed, our office will contact you with results only if changes need to made to the care plan  discussed with you at the visit. You can review your full results on [facility] Mychart.    Chief Complaint     Chief Complaint   Patient presents with    Possible UTI     Has urge of frequency and burning - started last night          History of Present Illness       89-year-old female arrives reporting burning and increased frequency of urination ongoing since last night.  Patient reports a history of UTIs in the past and reports this feels similar.  Patient denies any fevers.  Patient denies any abdominal pain, nausea vomiting or diarrhea.  Patient denies any vaginal bleeding or discharge.  Patient reports she has seen urology in the past but was not impressed with them so she did not go back.        Review of Systems   Review of Systems   Constitutional: Negative.  Negative for fever.   HENT: Negative.     Respiratory: Negative.     Cardiovascular: Negative.    Gastrointestinal: Negative.  Negative for abdominal pain.   Genitourinary:  Positive for dysuria, frequency and urgency. Negative for flank pain, vaginal bleeding and vaginal discharge.   Musculoskeletal: Negative.          Current Medications       Current Outpatient Medications:     aspirin 81 mg chewable tablet, Chew 81 mg daily, Disp: , Rfl:     calcium carbonate (Tums) 500 mg chewable tablet, Chew, Disp: , Rfl:     cephalexin (KEFLEX) 500 mg capsule, Take 1 capsule (500 mg total) by mouth every 12 (twelve) hours for 7 days, Disp: 14 capsule, Rfl: 0    CRANBERRY PO, Take by mouth, Disp: , Rfl:     diazepam (VALIUM) 5 mg tablet, 5 mg every 6 (six) hours as needed, Disp: , Rfl:     Diclofenac Sodium (VOLTAREN) 1 %, , Disp: , Rfl:     docusate sodium (COLACE) 50 mg capsule, Take 100 mg by mouth 2 (two) times a day , Disp: , Rfl:     lisinopril (ZESTRIL) 40 mg tablet, Take 40 mg by mouth daily , Disp: , Rfl:     simvastatin (ZOCOR) 10 mg tablet, 10 mg , Disp: , Rfl:     VITAMIN D, CHOLECALCIFEROL, PO, Take 2,000 Units by mouth, Disp: , Rfl:     Zinc 50 MG  TABS, Take by mouth, Disp: , Rfl:     co-enzyme Q-10 30 MG capsule, Take 30 mg by mouth 3 (three) times a day (Patient not taking: Reported on 4/28/2025), Disp: , Rfl:     ELDERBERRY PO, Take by mouth (Patient not taking: Reported on 2/5/2025), Disp: , Rfl:     estradiol (ESTRACE VAGINAL) 0.1 mg/g vaginal cream, Insert 1 g into the vagina 2 (two) times a week Apply pea size amount to external vagina 2 times per week (Patient not taking: Reported on 4/28/2025), Disp: 42.5 g, Rfl: 1    GARLIC PO, Take by mouth (Patient not taking: Reported on 4/28/2025), Disp: , Rfl:     Glucosamine-Chondroit-Vit C-Mn (Glucosamine Chondr 1500 Complx) CAPS, Take by mouth, Disp: , Rfl:     HYDROcodone-acetaminophen (NORCO) 5-325 mg per tablet, , Disp: , Rfl:     latanoprost (XALATAN) 0.005 % ophthalmic solution, , Disp: , Rfl:     magnesium oxide (MAG-OX) 400 mg tablet, Take by mouth (Patient not taking: Reported on 4/28/2025), Disp: , Rfl:     Omega-3 Fatty Acids (Fish Oil) 300 MG CAPS, , Disp: , Rfl:     omega-3-acid ethyl esters (LOVAZA) 1 g capsule, Take 2 g by mouth 2 (two) times a day (Patient not taking: Reported on 4/28/2025), Disp: , Rfl:     saccharomyces boulardii (FLORASTOR) 250 mg capsule, Take 250 mg by mouth 2 (two) times a day (Patient not taking: Reported on 4/28/2025), Disp: , Rfl:     zinc gluconate 50 mg tablet, Take 50 mg by mouth daily   (Patient not taking: Reported on 4/28/2025), Disp: , Rfl:     Current Allergies     Allergies as of 04/28/2025 - Reviewed 04/28/2025   Allergen Reaction Noted    Estradiol Other (See Comments) 05/19/2022    Sulfamethoxazole-trimethoprim Other (See Comments) 07/26/2020    Ibuprofen Other (See Comments) 10/03/2012            The following portions of the patient's history were reviewed and updated as appropriate: allergies, current medications, past family history, past medical history, past social history, past surgical history and problem list.     Past Medical History:   Diagnosis  "Date    Arthritis     High cholesterol     Hypertension     Urinary tract infection        Past Surgical History:   Procedure Laterality Date    APPENDECTOMY      CHOLECYSTECTOMY      HIP SURGERY      HYSTERECTOMY      JOINT REPLACEMENT      KNEE SURGERY         Family History   Problem Relation Age of Onset    Cancer Mother     Stroke Father          Medications have been verified.        Objective   /59   Pulse 99   Temp (!) 96.9 °F (36.1 °C)   Resp 20   Ht 5' 2\" (1.575 m)   Wt 69.9 kg (154 lb)   SpO2 96%   BMI 28.17 kg/m²        Physical Exam     Physical Exam  Vitals and nursing note reviewed.   Constitutional:       General: She is not in acute distress.     Appearance: Normal appearance. She is not ill-appearing, toxic-appearing or diaphoretic.   HENT:      Head: Normocephalic.      Right Ear: External ear normal.      Left Ear: External ear normal.      Nose: Nose normal.   Eyes:      Pupils: Pupils are equal, round, and reactive to light.   Cardiovascular:      Rate and Rhythm: Normal rate and regular rhythm.      Pulses: Normal pulses.      Heart sounds: Normal heart sounds.   Pulmonary:      Effort: Pulmonary effort is normal. No respiratory distress.      Breath sounds: Normal breath sounds. No stridor. No wheezing, rhonchi or rales.   Chest:      Chest wall: No tenderness.   Abdominal:      General: Bowel sounds are normal. There is no distension.      Palpations: Abdomen is soft. There is no mass.      Tenderness: There is no abdominal tenderness. There is no right CVA tenderness, left CVA tenderness, guarding or rebound.      Hernia: No hernia is present.   Musculoskeletal:         General: Normal range of motion.      Cervical back: Normal range of motion and neck supple.   Lymphadenopathy:      Cervical: No cervical adenopathy.   Skin:     General: Skin is warm and dry.      Capillary Refill: Capillary refill takes less than 2 seconds.   Neurological:      General: No focal deficit " present.      Mental Status: She is alert and oriented to person, place, and time.   Psychiatric:         Mood and Affect: Mood normal.         Behavior: Behavior normal.

## 2025-04-30 ENCOUNTER — TELEPHONE (OUTPATIENT)
Age: OVER 89
End: 2025-04-30

## 2025-04-30 NOTE — TELEPHONE ENCOUNTER
:     New Patient     Appointment Scheduling:  What office location does the patient prefer?:  What is the reason for the patient's appointment?:  Urgent care follow up   N30.01 (ICD-10-CM) - Acute cystitis with hematuria       Have patient records been requested?:  If No, are the records showing in Epic:   Appointment Details: Date 6/5/25   Time:3    Location: [facility]  Provider:  katalina  Does the appointment need further review? (Reason)        HISTORY:   Is the patient having active symptoms? If so, describe symptoms: recurring uti   Has the patient had any previous Urologist(s)?:no  Was the patient seen in the ED?: urgent care   Has the patient had any outside testing done?:  Does patient have Imaging/Lab Results: urine testing     Does the patient have a personal history of any cancer?:     INSURANCE:   Have you confirmed Patient's insurance? yes  Is the insurance accepted?   yes  Is the insurance active? yes

## 2025-05-01 ENCOUNTER — RESULTS FOLLOW-UP (OUTPATIENT)
Dept: URGENT CARE | Facility: CLINIC | Age: OVER 89
End: 2025-05-01

## 2025-05-01 LAB — BACTERIA UR CULT: ABNORMAL

## 2025-05-28 PROBLEM — N39.46 MIXED INCONTINENCE: Status: ACTIVE | Noted: 2025-05-28

## 2025-05-28 PROBLEM — N39.0 RECURRENT UTI: Status: ACTIVE | Noted: 2025-05-28

## 2025-05-28 NOTE — ASSESSMENT & PLAN NOTE
S/P unclear surgeries for incontinence in past x2  Occasional incontinence,  night time only - treatment options discussed, not bothered enough to add treatment  No interested PFPT   Limit night time beverages if bothered  Speak up if changes her mind for treatment

## 2025-05-28 NOTE — PROGRESS NOTES
Name: Bibi Box      : 1931      MRN: [id number]  Encounter Provider: DIONICIO Gonzalez  Encounter Date: 2025   Encounter department: [facility] [place]  :  Assessment & Plan  Recurrent UTI  Reports of recurrent UTI dating back to teenage years  Recently seen in urgent care, culture positive for e. Coli, treated with keflex  POCT negative for blood and nitrites.   PVR 0mL  Continue cranberry, probiotic tablets  Bowel regimen to avoid constipation and diarrhea  Adequate hydration  Timed void, double void  F/u 6 months recheck urine, speak up if any issues to be seen sooner       Orders:  •  POCT urine dip auto non-scope  •  POCT Measure PVR    Nocturia  S/P unclear surgeries for incontinence in past x2  Occasional incontinence,  night time only - treatment options discussed, not bothered enough to add treatment  No interested PFPT   Limit night time beverages if bothered  Speak up if changes her mind for treatment                 History of Present Illness {?Quick Links Encounters * My Last Note * Last Note in Specialty * Snapshot * Since Last Visit * History :[id number]}  Bibi Box is a 89 y.o. female who presents  for f/u from urgent care visit 25 for UTI symptoms. Previously seen by PAULINA Moreno, Last seen , no follow up.  PMH recurrent UTI onset in teenage years and mixed urinary incontinence.  At last visit she was asymptomatic with negative urine dip.  She was to continue with adequate hydration, start Estrace cream 2x weekly  and try PFPT.  Previously reported she may have had two surgeries for incontinence in the past, but unsure what they were. Recent creatinine 0.81 on 25.    Prior Urine cultures  25-- postive for e. Coli-treated with keflex  3/3/25-- low colony proteus, not meeting criteria for UTI  24-- low colony proteus, not meeting criteria for UTI  10/13/23--mixed contaminants  8/3/23--positive for e.coli    States she continues to takes  "daily cranberry and drink cranberry  juice. Typical symptoms are frequency and dysuria. Has never had a febrile UTI requiring hospitalization.  However, she stopped using her Estrace cream due to local irritation.    Today she denies hematuria, dysuria, frequency, urgency.  No sp tenderness, no fever, chills, flank pain. No concern for UTI at present.  States she urinates 3-4 times per day. And nocturia x 3-4 times with occasional incontinence. States she goes to bed at 9pm and drinks throughout the night.  Each time she wakes up, she drinks again. Denies hesitancy or issues with flow, feels as if she is empty well, she is not bothered by her symptoms, states her PCP made her come to this appointment      Review of Systems   Constitutional: Negative.  Negative for chills and fever.   HENT: Negative.     Eyes: Negative.    Respiratory: Negative.  Negative for chest tightness and shortness of breath.    Cardiovascular: Negative.    Gastrointestinal: Negative.  Negative for abdominal distention, constipation, diarrhea, nausea and vomiting.   Endocrine: Negative.    Genitourinary:  Positive for enuresis. Negative for difficulty urinating, dysuria, flank pain, frequency, hematuria and urgency.   Musculoskeletal:  Negative for back pain and neck pain.   Skin: Negative.    Neurological: Negative.  Negative for dizziness and headaches.   Psychiatric/Behavioral: Negative.  Negative for agitation and behavioral problems.           Objective {?Quick Links Trend Vitals * Enter New Vitals * Results Review * Timeline (Adult) * Labs * Imaging * Cardiology * Procedures * Lung Cancer Screening * Surgical eConsent :[id number]}  /64   Pulse 70   Temp 98.7 °F (37.1 °C)   Ht 5' 2\" (1.575 m)   Wt 67.7 kg (149 lb 3.2 oz)   SpO2 96%   BMI 27.29 kg/m²     Physical Exam  Vitals reviewed.   Constitutional:       General: She is not in acute distress.  HENT:      Head: Normocephalic and atraumatic.      Right Ear: External ear normal. " "     Left Ear: External ear normal.      Nose: Nose normal.     Eyes:      Extraocular Movements: Extraocular movements intact.      Pupils: Pupils are equal, round, and reactive to light.       Cardiovascular:      Rate and Rhythm: Normal rate.   Pulmonary:      Effort: Pulmonary effort is normal. No respiratory distress.   Abdominal:      Palpations: Abdomen is soft.      Tenderness: There is no abdominal tenderness. There is no right CVA tenderness, left CVA tenderness or guarding.     Musculoskeletal:         General: Normal range of motion.      Cervical back: Normal range of motion and neck supple.     Skin:     General: Skin is warm and dry.     Neurological:      General: No focal deficit present.      Mental Status: She is alert and oriented to person, place, and time. Mental status is at baseline.     Psychiatric:         Mood and Affect: Mood normal.         Behavior: Behavior normal.         Thought Content: Thought content normal.         Judgment: Judgment normal.          Results   No results found for: \"PSA\"  Lab Results   Component Value Date    CALCIUM 9.6 04/14/2025    K 4.7 04/14/2025    CO2 25.1 04/14/2025    CL 89 (L) 07/10/2020    BUN 27 (H) 04/14/2025    CREATININE 0.81 04/14/2025     Lab Results   Component Value Date    WBC 8.43 07/10/2020    HGB 13.7 07/10/2020    HCT 40.1 07/10/2020    MCV 95 07/10/2020     07/10/2020       Office Urine Dip  Recent Results (from the past hour)   POCT urine dip auto non-scope    Collection Time: 06/05/25  3:30 PM   Result Value Ref Range     COLOR,UA yellow     CLARITY,UA clear     SPECIFIC GRAVITY,UA 1.020      PH,UA 6.5     LEUKOCYTE ESTERASE,UA 1+     NITRITE,UA neg     GLUCOSE, UA neg     KETONES,UA neg     BILIRUBIN,UA neg     BLOOD,UA neg     POCT URINE PROTEIN neg     SL AMB POCT UROBILINOGEN 0.2    POCT Measure PVR    Collection Time: 06/05/25  3:37 PM   Result Value Ref Range    POST-VOID RESIDUAL VOLUME, ML POC 0 mL         "

## 2025-05-28 NOTE — ASSESSMENT & PLAN NOTE
Reports of recurrent UTI dating back to teenage years  Recently seen in urgent care, culture positive for e. Coli, treated with keflex  POCT negative for blood and nitrites.   PVR 0mL  Continue cranberry, probiotic tablets  Bowel regimen to avoid constipation and diarrhea  Adequate hydration  Timed void, double void  F/u 6 months recheck urine, speak up if any issues to be seen sooner       Orders:  •  POCT urine dip auto non-scope  •  POCT Measure PVR

## 2025-06-05 ENCOUNTER — CONSULT (OUTPATIENT)
Dept: UROLOGY | Facility: CLINIC | Age: OVER 89
End: 2025-06-05

## 2025-06-05 VITALS
SYSTOLIC BLOOD PRESSURE: 120 MMHG | DIASTOLIC BLOOD PRESSURE: 64 MMHG | BODY MASS INDEX: 27.46 KG/M2 | OXYGEN SATURATION: 96 % | HEART RATE: 70 BPM | WEIGHT: 149.2 LBS | HEIGHT: 62 IN | TEMPERATURE: 98.7 F

## 2025-06-05 DIAGNOSIS — R35.1 NOCTURIA: ICD-10-CM

## 2025-06-05 DIAGNOSIS — N39.0 RECURRENT UTI: Primary | ICD-10-CM

## 2025-06-05 LAB
POST-VOID RESIDUAL VOLUME, ML POC: 0 ML
SL AMB  POCT GLUCOSE, UA: NORMAL
SL AMB LEUKOCYTE ESTERASE,UA: NORMAL
SL AMB POCT BILIRUBIN,UA: NORMAL
SL AMB POCT BLOOD,UA: NORMAL
SL AMB POCT CLARITY,UA: CLEAR
SL AMB POCT COLOR,UA: YELLOW
SL AMB POCT KETONES,UA: NORMAL
SL AMB POCT NITRITE,UA: NORMAL
SL AMB POCT PH,UA: 6.5
SL AMB POCT SPECIFIC GRAVITY,UA: 1.02
SL AMB POCT URINE PROTEIN: NORMAL
SL AMB POCT UROBILINOGEN: 0.2

## 2025-06-06 ENCOUNTER — TELEPHONE (OUTPATIENT)
Age: OVER 89
End: 2025-06-06

## 2025-06-06 NOTE — TELEPHONE ENCOUNTER
Dr Pereira's office called today to say that the patient is no longer being seen at their office. Per [facility] the patient is seeing an Layla Baker in [place]. Her office's tel number is [phone].     Dr Pereira's office asked that this information be relayed to provider.

## 2025-06-17 ENCOUNTER — OFFICE VISIT (OUTPATIENT)
Dept: URGENT CARE | Facility: CLINIC | Age: OVER 89
End: 2025-06-17
Payer: COMMERCIAL

## 2025-06-17 VITALS
WEIGHT: 148 LBS | OXYGEN SATURATION: 97 % | HEIGHT: 62 IN | DIASTOLIC BLOOD PRESSURE: 73 MMHG | RESPIRATION RATE: 18 BRPM | HEART RATE: 84 BPM | TEMPERATURE: 96.8 F | BODY MASS INDEX: 27.23 KG/M2 | SYSTOLIC BLOOD PRESSURE: 138 MMHG

## 2025-06-17 DIAGNOSIS — R30.0 BURNING WITH URINATION: Primary | ICD-10-CM

## 2025-06-17 LAB
SL AMB  POCT GLUCOSE, UA: ABNORMAL
SL AMB LEUKOCYTE ESTERASE,UA: ABNORMAL
SL AMB POCT BILIRUBIN,UA: ABNORMAL
SL AMB POCT BLOOD,UA: ABNORMAL
SL AMB POCT CLARITY,UA: CLEAR
SL AMB POCT COLOR,UA: YELLOW
SL AMB POCT KETONES,UA: ABNORMAL
SL AMB POCT NITRITE,UA: ABNORMAL
SL AMB POCT PH,UA: 5
SL AMB POCT SPECIFIC GRAVITY,UA: 1.01
SL AMB POCT URINE PROTEIN: 30
SL AMB POCT UROBILINOGEN: 0.2

## 2025-06-17 PROCEDURE — 87086 URINE CULTURE/COLONY COUNT: CPT

## 2025-06-17 PROCEDURE — 99214 OFFICE O/P EST MOD 30 MIN: CPT

## 2025-06-17 PROCEDURE — 81002 URINALYSIS NONAUTO W/O SCOPE: CPT

## 2025-06-17 RX ORDER — CEPHALEXIN 500 MG/1
500 CAPSULE ORAL EVERY 6 HOURS SCHEDULED
Qty: 20 CAPSULE | Refills: 0 | Status: SHIPPED | OUTPATIENT
Start: 2025-06-17 | End: 2025-06-22

## 2025-06-17 NOTE — PROGRESS NOTES
[facility]  Name: Bibi Box      : 1931      MRN: [id number]  Encounter Provider: Josue Taylor PA-C  Encounter Date: 2025   Encounter department: [facility] [place]  :  Assessment & Plan  Burning with urination    Orders:    POCT urine dip    Urine culture    cephalexin (KEFLEX) 500 mg capsule; Take 1 capsule (500 mg total) by mouth every 6 (six) hours for 5 days    Negative leuks and negative nitrates however the patient past medical history prescribed Keflex for her to start should her urine culture start to showing growth.  Recommended holding off on taking them until official results come out given there is no sign of objective evidence think she has UTI on exam.  Will follow-up with culture.    Patient Instructions  Follow up with PCP in 3-5 days.  Proceed to  ER if symptoms worsen.    If tests are performed, our office will contact you with results only if changes need to made to the care plan discussed with you at the visit. You can review your full results on [facility] MyChart.    Chief Complaint:   Chief Complaint   Patient presents with    Possible UTI     Burning with urination and urgency that started this morning      History of Present Illness   89-year-old PMH UTIs and high cholesterol presenting for UTI symptoms starting this a.m.  Mainly complaining of frequency and dysuria.  Has not taken anything for the issue.  Previously did follow-up with urology with no accurate explanation.  Was last seen about 2 to 3 months ago for UTI.  Previously had leuks in her urine but nothing in her dip today.  Denies fevers, chills, bodies nausea vomiting or diarrhea.  Denies any flank pain or back pain as well.  No blood in the urine.          Review of Systems   Constitutional:  Negative for chills, fatigue and fever.   HENT:  Negative for congestion, ear pain and sore throat.    Eyes:  Negative for discharge and redness.   Respiratory:  Negative for chest tightness and  "shortness of breath.    Cardiovascular:  Negative for chest pain and palpitations.   Gastrointestinal:  Negative for abdominal pain, nausea and vomiting.   Genitourinary:  Positive for dysuria, frequency and urgency. Negative for flank pain.   Musculoskeletal:  Negative for back pain and myalgias.   Neurological:  Negative for dizziness, light-headedness and headaches.   Psychiatric/Behavioral:  Negative for confusion.      Past Medical History   Past Medical History[1]  Past Surgical History[2]  Family History[3]  she reports that she has never smoked. She has never used smokeless tobacco. She reports that she does not currently use alcohol after a past usage of about 1.0 standard drink of alcohol per week. She reports that she does not use drugs.  Current Outpatient Medications   Medication Instructions    aspirin 81 mg, Daily    calcium carbonate (Tums) 500 mg chewable tablet Chew    cephalexin (KEFLEX) 500 mg, Oral, Every 6 hours scheduled    CRANBERRY PO Take by mouth    diazepam (VALIUM) 5 mg, Every 6 hours PRN    Diclofenac Sodium (VOLTAREN) 1 % No dose, route, or frequency recorded.    docusate sodium (COLACE) 100 mg, 2 times daily    Glucosamine-Chondroit-Vit C-Mn (Glucosamine Chondr 1500 Complx) CAPS Take by mouth    lisinopril (ZESTRIL) 40 mg, Daily    magnesium oxide (MAG-OX) 400 mg tablet Take by mouth    Omega-3 Fatty Acids (Fish Oil) 300 MG CAPS     simvastatin (ZOCOR) 10 mg    VITAMIN D, CHOLECALCIFEROL, PO 2,000 Units    Zinc 50 MG TABS Take by mouth   Allergies[4]     Objective   /73   Pulse 84   Temp (!) 96.8 °F (36 °C) (Temporal)   Resp 18   Ht 5' 2\" (1.575 m)   Wt 67.1 kg (148 lb)   SpO2 97%   BMI 27.07 kg/m²      Physical Exam  Vitals and nursing note reviewed.   Constitutional:       Appearance: She is normal weight.   HENT:      Head: Normocephalic and atraumatic.      Right Ear: External ear normal.      Left Ear: External ear normal.      Nose: Nose normal.      Mouth/Throat: " "     Mouth: Mucous membranes are moist.      Pharynx: Oropharynx is clear.     Eyes:      Conjunctiva/sclera: Conjunctivae normal.      Pupils: Pupils are equal, round, and reactive to light.       Cardiovascular:      Rate and Rhythm: Normal rate and regular rhythm.      Pulses: Normal pulses.   Pulmonary:      Effort: Pulmonary effort is normal.   Abdominal:      Palpations: Abdomen is soft.      Tenderness: There is no abdominal tenderness. There is no right CVA tenderness or left CVA tenderness.     Skin:     General: Skin is warm and dry.      Capillary Refill: Capillary refill takes less than 2 seconds.     Neurological:      Mental Status: She is alert and oriented to person, place, and time.     Psychiatric:         Mood and Affect: Mood normal.         Behavior: Behavior normal.         Portions of the record may have been created with voice recognition software.  Occasional wrong word or \"sound a like\" substitutions may have occurred due to the inherent limitations of voice recognition software.  Read the chart carefully and recognize, using context, where substitutions have occurred.       [1]   Past Medical History:  Diagnosis Date    Arthritis     High cholesterol     Hypertension     Urinary tract infection    [2]   Past Surgical History:  Procedure Laterality Date    APPENDECTOMY      CHOLECYSTECTOMY      HIP SURGERY      HYSTERECTOMY      JOINT REPLACEMENT      KNEE SURGERY     [3]   Family History  Problem Relation Name Age of Onset    Cancer Mother      Stroke Father     [4]   Allergies  Allergen Reactions    Estradiol Other (See Comments)     Burning over application site    Sulfamethoxazole-Trimethoprim Other (See Comments)     Was told by her physician she should not take Bactrim. \"hyponatremia\"    Was told by her physician she should not take Bactrim    Ibuprofen Other (See Comments)     \"funny feeling in head\"     "

## 2025-06-17 NOTE — PATIENT INSTRUCTIONS
Recurrent UTI Recommendations:  Please always ensure your urine is sent for a URINE CULTURE if you get UTIs frequently.    Cranberry vitamins (500 mg daily) NOT JUICE- juice is too sugary and acidic    D-mannose: this helps build a protection in the bladder to prevent bacterial growth    Vaginal moisturizers (Replens/Lenuva/coconut oil): you can help protect against bacteria from the outside, not just the inside; lack of estrogen makes the pelvic area more susceptible to infection in post-menopausal women    Good hydration helps to dilute urine    Speak with a  urologist/urogynecologist if infections or symptoms of burning/frequency continue to consider preventive UTI management or incontinence management    Also wipe front to back    Shower instead of baths    Change feminine products every 2-4 hours    REMEMBER: there are dozens of bacteria that can cause a UTI, not every antibiotic treats every bacteria. This is why a CULTURE is important to tell us EXACTLY which bacteria is growing.

## 2025-06-19 LAB — BACTERIA UR CULT: NORMAL

## 2025-06-27 PROBLEM — N39.0 RECURRENT UTI: Status: RESOLVED | Noted: 2025-05-28 | Resolved: 2025-06-27
